# Patient Record
Sex: MALE | Race: BLACK OR AFRICAN AMERICAN | Employment: OTHER | URBAN - METROPOLITAN AREA
[De-identification: names, ages, dates, MRNs, and addresses within clinical notes are randomized per-mention and may not be internally consistent; named-entity substitution may affect disease eponyms.]

---

## 2017-01-05 ENCOUNTER — TELEPHONE (OUTPATIENT)
Dept: FAMILY MEDICINE | Facility: CLINIC | Age: 27
End: 2017-01-05

## 2017-01-05 DIAGNOSIS — E03.9 HYPOTHYROIDISM, UNSPECIFIED TYPE: Primary | ICD-10-CM

## 2017-01-11 DIAGNOSIS — E03.9 HYPOTHYROIDISM, UNSPECIFIED TYPE: ICD-10-CM

## 2017-01-11 PROCEDURE — 84443 ASSAY THYROID STIM HORMONE: CPT | Performed by: FAMILY MEDICINE

## 2017-01-11 PROCEDURE — 36415 COLL VENOUS BLD VENIPUNCTURE: CPT | Performed by: FAMILY MEDICINE

## 2017-01-12 LAB — TSH SERPL DL<=0.005 MIU/L-ACNC: 0.54 MU/L (ref 0.4–4)

## 2017-01-17 ENCOUNTER — TELEPHONE (OUTPATIENT)
Dept: FAMILY MEDICINE | Facility: CLINIC | Age: 27
End: 2017-01-17

## 2017-01-17 NOTE — TELEPHONE ENCOUNTER
Received form(s) from [employer] for [employer].  Placed form(s) in/on LS's box.  Forms need to be filled out and signed and faxed to  [phone]    Call pt to verify form was sent: No  Copy needs to be sent for scanning after completion: Yes

## 2017-04-05 DIAGNOSIS — K21.9 GASTROESOPHAGEAL REFLUX DISEASE WITHOUT ESOPHAGITIS: ICD-10-CM

## 2017-04-05 NOTE — TELEPHONE ENCOUNTER
Pending Prescriptions:                       Disp   Refills    omeprazole (PRILOSEC) 20 MG CR capsule [P*30 cap*             Sig: TAKE 1 CAPSULE BY MOUTH DAILY (30-60 MINUTE(S)           BEFORE A MEAL)          Last Written Prescription Date: 01/09/2017  Last Fill Quantity: 30,  # refills: 2   Last Office Visit with [facility], [facility] or [facility] prescribing provider: 10/19/2016

## 2017-04-06 NOTE — TELEPHONE ENCOUNTER
Prescription approved per [facility] Refill Protocol.  Christin Bradley RN

## 2017-05-04 DIAGNOSIS — E03.9 HYPOTHYROIDISM, UNSPECIFIED TYPE: ICD-10-CM

## 2017-05-04 RX ORDER — LEVOTHYROXINE SODIUM 50 UG/1
TABLET ORAL
Qty: 90 TABLET | Refills: 1 | Status: SHIPPED | OUTPATIENT
Start: 2017-05-04 | End: 2017-11-03

## 2017-05-04 NOTE — TELEPHONE ENCOUNTER
Prescription approved per [facility] Refill Protocol.     TSH   Date Value Ref Range Status   01/11/2017 0.54 0.40 - 4.00 mU/L Final

## 2017-05-14 ENCOUNTER — TRANSFERRED RECORDS (OUTPATIENT)
Dept: HEALTH INFORMATION MANAGEMENT | Facility: CLINIC | Age: 27
End: 2017-05-14

## 2017-07-07 DIAGNOSIS — K21.9 GASTROESOPHAGEAL REFLUX DISEASE WITHOUT ESOPHAGITIS: ICD-10-CM

## 2017-07-07 DIAGNOSIS — E03.9 HYPOTHYROIDISM, UNSPECIFIED TYPE: ICD-10-CM

## 2017-07-07 RX ORDER — LEVOTHYROXINE SODIUM 50 UG/1
TABLET ORAL
Start: 2017-07-07

## 2017-07-07 NOTE — TELEPHONE ENCOUNTER
Levothyroxine:  Too soon. Rx sent 5/4/17 for #90 with 1 refill    TSH   Date Value Ref Range Status   01/11/2017 0.54 0.40 - 4.00 mU/L Final     Omeprazole:  Prescription approved per [facility] Refill Protocol..  Christin Bradley RN

## 2017-09-06 DIAGNOSIS — K21.9 GASTROESOPHAGEAL REFLUX DISEASE WITHOUT ESOPHAGITIS: ICD-10-CM

## 2017-09-07 NOTE — TELEPHONE ENCOUNTER
Prescription approved per [facility] Refill Protocol.  Colleen LOPES RN

## 2017-09-07 NOTE — TELEPHONE ENCOUNTER
Pending Prescriptions:                       Disp   Refills    omeprazole (PRILOSEC) 20 MG CR capsule [P*30 cap*             Sig: TAKE 1 CAPSULE BY MOUTH DAILY (30-60 MINUTE(S)           BEFORE A MEAL)          Last Written Prescription Date: 07/07/2017  Last Fill Quantity: 30,  # refills: 1   Last Office Visit with [facility], P or [facility] prescribing provider: 10/19/2016                                         Next 5 appointments (look out 90 days)     Oct 25, 2017  3:30 PM CDT   PHYSICAL with Tatiana Juan MD   [facility] ([facility])    [address] [id number] [phone]

## 2017-09-13 ENCOUNTER — OFFICE VISIT (OUTPATIENT)
Dept: FAMILY MEDICINE | Facility: CLINIC | Age: 27
End: 2017-09-13
Payer: MEDICAID

## 2017-09-13 VITALS
HEIGHT: 69 IN | HEART RATE: 67 BPM | SYSTOLIC BLOOD PRESSURE: 122 MMHG | DIASTOLIC BLOOD PRESSURE: 84 MMHG | BODY MASS INDEX: 26.96 KG/M2 | TEMPERATURE: 97.8 F | OXYGEN SATURATION: 99 % | RESPIRATION RATE: 14 BRPM | WEIGHT: 182 LBS

## 2017-09-13 DIAGNOSIS — Z11.3 SCREEN FOR STD (SEXUALLY TRANSMITTED DISEASE): Primary | ICD-10-CM

## 2017-09-13 LAB
HCV AB SERPL QL IA: NONREACTIVE
HIV 1+2 AB+HIV1 P24 AG SERPL QL IA: NONREACTIVE
T PALLIDUM IGG+IGM SER QL: NEGATIVE

## 2017-09-13 PROCEDURE — 87491 CHLMYD TRACH DNA AMP PROBE: CPT | Performed by: FAMILY MEDICINE

## 2017-09-13 PROCEDURE — 86803 HEPATITIS C AB TEST: CPT | Performed by: FAMILY MEDICINE

## 2017-09-13 PROCEDURE — 87591 N.GONORRHOEAE DNA AMP PROB: CPT | Performed by: FAMILY MEDICINE

## 2017-09-13 PROCEDURE — 87389 HIV-1 AG W/HIV-1&-2 AB AG IA: CPT | Performed by: FAMILY MEDICINE

## 2017-09-13 PROCEDURE — 36415 COLL VENOUS BLD VENIPUNCTURE: CPT | Performed by: FAMILY MEDICINE

## 2017-09-13 PROCEDURE — 99213 OFFICE O/P EST LOW 20 MIN: CPT | Performed by: FAMILY MEDICINE

## 2017-09-13 PROCEDURE — 86780 TREPONEMA PALLIDUM: CPT | Performed by: FAMILY MEDICINE

## 2017-09-13 NOTE — LETTER
September 19, 2017    Jerrod Solis  [address]   [place] [address]      Dear Jerrod,    The results of your recent labs were normal.(Negative)  Your results are enclosed.    Thank you very much for choosing [facility]. Please call my office if you have any questions or concerns.      Sincerely,        Tatiana Juan MD

## 2017-09-13 NOTE — NURSING NOTE
"Chief Complaint   Patient presents with     STD     screening       Initial /84  Pulse 67  Temp 97.8  F (36.6  C) (Oral)  Resp 14  Ht 5' 8.5\" (1.74 m)  Wt 182 lb (82.6 kg)  SpO2 99%  BMI 27.27 kg/m2 Estimated body mass index is 27.27 kg/(m^2) as calculated from the following:    Height as of this encounter: 5' 8.5\" (1.74 m).    Weight as of this encounter: 182 lb (82.6 kg).  BP completed using cuff size: regular    Health Maintenance that is potentially due pending provider review:  Health Maintenance Due   Topic Date Due     INFLUENZA VACCINE (SYSTEM ASSIGNED)  09/01/2017         Per provider  "

## 2017-09-13 NOTE — PROGRESS NOTES
SUBJECTIVE:   Jerrod Solis is a 26 year old male who presents to clinic today for the following health issues:      Chief Complaint   Patient presents with     STD     screening     No known exposure        Problem list and histories reviewed & adjusted, as indicated.  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 No pertinent surgical history.    Social History   Substance Use Topics     Smoking status: Former Smoker     Smokeless tobacco: Never Used     Alcohol use No     Family History   Problem Relation Age of Onset     DIABETES No family hx of      Coronary Artery Disease No family hx of      Hypertension No family hx of      Hyperlipidemia No family hx of      CEREBROVASCULAR DISEASE No family hx of      Breast Cancer No family hx of      Colon Cancer No family hx of      Prostate Cancer No family hx of      Other Cancer No family hx of      Depression No family hx of      Anxiety Disorder No family hx of      MENTAL ILLNESS No family hx of      Substance Abuse No family hx of      Anesthesia Reaction No family hx of      Asthma No family hx of      OSTEOPOROSIS No family hx of      Genetic Disorder No family hx of      Thyroid Disease No family hx of      Obesity No family hx of      Unknown/Adopted No family hx of          Current Outpatient Prescriptions   Medication Sig Dispense Refill     omeprazole (PRILOSEC) 20 MG CR capsule TAKE 1 CAPSULE BY MOUTH DAILY (30-60  MINUTE(S) BEFORE A MEAL) 30 capsule 1     levothyroxine (SYNTHROID/LEVOTHROID) 50 MCG tablet TAKE 1 TABLET BY MOUTH DAILY 90 tablet 1     OLANZapine (ZYPREXA) 5 MG tablet Take 5 mg by mouth At Bedtime Pt taking 1/2 tab ( 2.5 mg) daily at bedtime       ABILIFY 10 MG tablet Take 10 mg by mouth daily        lithium (ESKALITH/LITHOBID) 300 MG CR tablet        buPROPion (WELLBUTRIN XL) 300 MG 24 hr tablet every morning       Allergies   Allergen Reactions     Depakote [Valproic Acid]      Ibuprofen      Pt on [place] and CANNOT take ibuprofen with this medication     Recent Labs   Lab Test  01/11/17   1516  12/14/16   1335  10/19/16   1619  05/25/16   1145   11/18/15   1522  10/14/15   1320   06/13/13   1843   A1C   --    --    --   5.4   --    --   5.5   --   5.8   LDL   --   131*   --   131*   --   150*   --    --    --    HDL   --   72   --   69   --   78   --    --    --    TRIG   --   113   --   112   --   157*   --    --    --    ALT   --    --   33   --    --   27  28   < >   --    CR   --    --   1.14  1.15   --   1.09  1.17   < >  1.38*   GFRESTIMATED   --    --   78  78   --   83  77   < >  65   GFRESTBLACK   --    --   >90   GFR Calc    >90   GFR Calc     --   >90   GFR Calc    >90   GFR Calc     < >  79   POTASSIUM   --    --   4.1  4.1   --   4.2  4.0   < >   --    TSH  0.54   --   0.02*  2.04   < >  0.91   --    < >  2.66    < > = values in this interval not displayed.      BP Readings from Last 3 Encounters:   09/13/17 122/84   10/19/16 118/80   09/21/16 130/88    Wt Readings from Last 3 Encounters:   09/13/17 182 lb (82.6 kg)   10/19/16 174 lb (78.9 kg)   09/21/16 176 lb 1.6 oz (79.9 kg)                  Labs reviewed in EPIC          Reviewed and updated as needed this visit by clinical staffTobacco  Allergies  Meds  Med Hx  Surg Hx  Fam Hx  Soc Hx      Reviewed and updated as needed this visit by Provider         ROS:  Constitutional,  "HEENT, cardiovascular, pulmonary, GI, , musculoskeletal, neuro, skin, endocrine and psych systems are negative, except as otherwise noted.      OBJECTIVE:   /84  Pulse 67  Temp 97.8  F (36.6  C) (Oral)  Resp 14  Ht 5' 8.5\" (1.74 m)  Wt 182 lb (82.6 kg)  SpO2 99%  BMI 27.27 kg/m2  Body mass index is 27.27 kg/(m^2).  GENERAL: healthy, alert and no distress  EYES: Eyes grossly normal to inspection, PERRL and conjunctivae and sclerae normal  NECK: no adenopathy, no asymmetry, masses, or scars and thyroid normal to palpation  RESP: lungs clear to auscultation - no rales, rhonchi or wheezes  CV: regular rate and rhythm, normal S1 S2, no S3 or S4, no murmur, click or rub, no peripheral edema and peripheral pulses strong  ABDOMEN: soft, nontender, no hepatosplenomegaly, no masses and bowel sounds normal  MS: no gross musculoskeletal defects noted, no edema    Diagnostic Test Results:  No results found for this or any previous visit (from the past 24 hour(s)).    ASSESSMENT/PLAN:         1. Screen for STD (sexually transmitted disease)  Will check , he denies symptoms but has had a new partner although they are not together anymore   - HIV Antigen Antibody Combo  - Anti Treponema  - Hepatitis C antibody  - NEISSERIA GONORRHOEA PCR  - CHLAMYDIA TRACHOMATIS PCR    Will be informed about the results , plan      Tatiana Juan MD  [facility]    "

## 2017-09-13 NOTE — MR AVS SNAPSHOT
"              After Visit Summary   9/13/2017    Jerrod Solis    MRN: [id number]           Patient Information     Date Of Birth          1990        Visit Information        Provider Department      9/13/2017 9:00 AM Tatiana Juan MD [facility]        Today's Diagnoses     Screen for STD (sexually transmitted disease)    -  1       Follow-ups after your visit        Your next 10 appointments already scheduled     Oct 25, 2017  3:30 PM CDT   PHYSICAL with Tatiana Juan MD   [facility] ([facility])    [address] [address] [phone]              Who to contact     If you have questions or need follow up information about today's clinic visit or your schedule please contact [facility] directly at [phone].  Normal or non-critical lab and imaging results will be communicated to you by MyChart, letter or phone within 4 business days after the clinic has received the results. If you do not hear from us within 7 days, please contact the clinic through MyChart or phone. If you have a critical or abnormal lab result, we will notify you by phone as soon as possible.  Submit refill requests through [employer] or call your pharmacy and they will forward the refill request to us. Please allow 3 business days for your refill to be completed.          Additional Information About Your Visit        MyChart Information     [employer] lets you send messages to your doctor, view your test results, renew your prescriptions, schedule appointments and more. To sign up, go to [email or web address] . Click on \"Log in\" on the left side of the screen, which will take you to the Welcome page. Then click on \"Sign up Now\" on the right side of the page.     You will be asked to enter the access code listed below, as well as some personal information. Please follow the directions to create your username and password.     Your access code is: " [email or web address]  Expires: 2017  9:45 AM     Your access code will  in 90 days. If you need help or a new code, please call your [facility] or [phone].        Care EveryWhere ID     This is your Care EveryWhere ID. This could be used by other organizations to access your [place] medical records  [id number]        Your Vitals Were     Pulse Temperature Respirations Height Pulse Oximetry BMI (Body Mass Index)    67 97.8  F (36.6  C) (Oral) 14 5' 8.5\" (1.74 m) 99% 27.27 kg/m2       Blood Pressure from Last 3 Encounters:   17 122/84   10/19/16 118/80   16 130/88    Weight from Last 3 Encounters:   17 182 lb (82.6 kg)   10/19/16 174 lb (78.9 kg)   16 176 lb 1.6 oz (79.9 kg)              We Performed the Following     Anti Treponema     CHLAMYDIA TRACHOMATIS PCR     Hepatitis C antibody     HIV Antigen Antibody Combo     NEISSERIA GONORRHOEA PCR        Primary Care Provider Office Phone # Fax #    Tatiana Juan [phone] [address] [address]        Equal Access to Services     JAMEL TURNER : Hadii elle ku hadasho Soomaali, waaxda luqadaha, qaybta kaalmada adeegyada, waxay idiin haysally carrero. So [facility] [phone].    ATENCIÓN: Si habla español, tiene a rock disposición servicios gratuitos de asistencia lingüística. Llame al [phone].    We comply with applicable federal civil rights laws and [place] laws. We do not discriminate on the basis of race, color, national origin, age, disability sex, sexual orientation or gender identity.            Thank you!     Thank you for choosing [facility]  for your care. Our goal is always to provide you with excellent care. Hearing back from our patients is one way we can continue to improve our services. Please take a few minutes to complete the written survey that you may receive in the mail after your visit with us. Thank you!             Your Updated " Medication List - Protect others around you: Learn how to safely use, store and throw away your medicines at www.disposemymeds.org.          This list is accurate as of: 9/13/17  9:45 AM.  Always use your most recent med list.                   Brand Name Dispense Instructions for use Diagnosis    ABILIFY 10 MG tablet   Generic drug:  ARIPiprazole      Take 10 mg by mouth daily        buPROPion 300 MG 24 hr tablet    WELLBUTRIN XL     every morning        levothyroxine 50 MCG tablet    SYNTHROID/LEVOTHROID    90 tablet    TAKE 1 TABLET BY MOUTH DAILY    Hypothyroidism, unspecified type       lithium 300 MG CR tablet    ESKALITH/LITHOBID          OLANZapine 5 MG tablet    zyPREXA     Take 5 mg by mouth At Bedtime Pt taking 1/2 tab ( 2.5 mg) daily at bedtime        omeprazole 20 MG CR capsule    priLOSEC    30 capsule    TAKE 1 CAPSULE BY MOUTH DAILY (30-60 MINUTE(S) BEFORE A MEAL)    Gastroesophageal reflux disease without esophagitis

## 2017-09-14 LAB
C TRACH DNA SPEC QL NAA+PROBE: NEGATIVE
N GONORRHOEA DNA SPEC QL NAA+PROBE: NEGATIVE
SPECIMEN SOURCE: NORMAL
SPECIMEN SOURCE: NORMAL

## 2017-11-03 DIAGNOSIS — K21.9 GASTROESOPHAGEAL REFLUX DISEASE WITHOUT ESOPHAGITIS: ICD-10-CM

## 2017-11-03 DIAGNOSIS — E03.9 HYPOTHYROIDISM, UNSPECIFIED TYPE: ICD-10-CM

## 2017-11-06 RX ORDER — LEVOTHYROXINE SODIUM 50 UG/1
TABLET ORAL
Qty: 30 TABLET | Refills: 0 | Status: SHIPPED | OUTPATIENT
Start: 2017-11-06 | End: 2017-12-06

## 2017-11-06 NOTE — TELEPHONE ENCOUNTER
Medication is being filled for 1 time refill only due to:  due for physical   Colleen LOPES RN    Requested Prescriptions   Pending Prescriptions Disp Refills     levothyroxine (SYNTHROID/LEVOTHROID) 50 MCG tablet [Pharmacy Med Name: LEVOTHYROXINE 50MCG TAB] 30 tablet      Sig: TAKE 1 TABLET BY MOUTH DAILY    Thyroid Protocol Passed    11/3/2017  1:26 PM       Passed - Patient is 12 years or older       Passed - Recent or future visit with authorizing provider's specialty    Patient had office visit in the last year or has a visit in the next 30 days with authorizing provider.  See chart review.              Passed - Normal TSH on file in past 12 months    Recent Labs   Lab Test  01/11/17   1516   TSH  0.54              omeprazole (PRILOSEC) 20 MG CR capsule [Pharmacy Med Name: OMEPRAZOLE 20MG CAP] 30 capsule      Sig: TAKE 1 CAPSULE BY MOUTH DAILY (30-60 MINUTE(S) BEFORE A MEAL)    PPI Protocol Passed    11/3/2017  1:26 PM       Passed - Not on Clopidogrel (unless Pantoprazole ordered)       Passed - No diagnosis of osteoporosis on record       Passed - Recent or future visit with authorizing provider's specialty    Patient had office visit in the last year or has a visit in the next 30 days with authorizing provider.  See chart review.              Passed - Patient is age 18 or older

## 2017-11-13 ENCOUNTER — OFFICE VISIT (OUTPATIENT)
Dept: FAMILY MEDICINE | Facility: CLINIC | Age: 27
End: 2017-11-13
Payer: MEDICAID

## 2017-11-13 ENCOUNTER — TELEPHONE (OUTPATIENT)
Dept: FAMILY MEDICINE | Facility: CLINIC | Age: 27
End: 2017-11-13

## 2017-11-13 VITALS
BODY MASS INDEX: 26.62 KG/M2 | OXYGEN SATURATION: 100 % | WEIGHT: 179.7 LBS | DIASTOLIC BLOOD PRESSURE: 86 MMHG | TEMPERATURE: 97.7 F | HEART RATE: 71 BPM | HEIGHT: 69 IN | SYSTOLIC BLOOD PRESSURE: 131 MMHG

## 2017-11-13 DIAGNOSIS — R42 DIZZINESS: Primary | ICD-10-CM

## 2017-11-13 LAB
ERYTHROCYTE [DISTWIDTH] IN BLOOD BY AUTOMATED COUNT: 17.7 % (ref 10–15)
GLUCOSE BLD-MCNC: 100 MG/DL (ref 70–99)
HCT VFR BLD AUTO: 40.5 % (ref 40–53)
HGB BLD-MCNC: 13.7 G/DL (ref 13.3–17.7)
LITHIUM SERPL-SCNC: 0.95 MMOL/L (ref 0.6–1.2)
MCH RBC QN AUTO: 27 PG (ref 26.5–33)
MCHC RBC AUTO-ENTMCNC: 33.8 G/DL (ref 31.5–36.5)
MCV RBC AUTO: 80 FL (ref 78–100)
PLATELET # BLD AUTO: 198 10E9/L (ref 150–450)
RBC # BLD AUTO: 5.07 10E12/L (ref 4.4–5.9)
WBC # BLD AUTO: 7.4 10E9/L (ref 4–11)

## 2017-11-13 PROCEDURE — 36415 COLL VENOUS BLD VENIPUNCTURE: CPT | Performed by: FAMILY MEDICINE

## 2017-11-13 PROCEDURE — 80178 ASSAY OF LITHIUM: CPT | Performed by: FAMILY MEDICINE

## 2017-11-13 PROCEDURE — 93000 ELECTROCARDIOGRAM COMPLETE: CPT | Performed by: FAMILY MEDICINE

## 2017-11-13 PROCEDURE — 99213 OFFICE O/P EST LOW 20 MIN: CPT | Performed by: FAMILY MEDICINE

## 2017-11-13 PROCEDURE — 82947 ASSAY GLUCOSE BLOOD QUANT: CPT | Mod: 59 | Performed by: FAMILY MEDICINE

## 2017-11-13 PROCEDURE — 85027 COMPLETE CBC AUTOMATED: CPT | Performed by: FAMILY MEDICINE

## 2017-11-13 PROCEDURE — 80053 COMPREHEN METABOLIC PANEL: CPT | Performed by: FAMILY MEDICINE

## 2017-11-13 NOTE — TELEPHONE ENCOUNTER
Nadine from [facility] calling  Patient has had upset stomach and dizziness  Is scheduled for today, but wants to know if can be assessed for symptoms tomorrow at physical instead.  Told Nadine depends on patient's symptoms as to when he wants to get in  Can cancel today's appt if he wants and address at physical, but if LS does not have time, may ask that pt return another date for physical  Nadine will talk to patient and call clinic back if pt not wanting appt for today  Colleen LOPES RN    Next 5 appointments (look out 90 days)     Nov 13, 2017 12:30 PM CST   Office Visit with Selene Gamez MD   [facility] ([facility]    [address] [id number] [phone]            Nov 14, 2017 11:00 AM CST   PHYSICAL with Tatiana Juan MD   [facility] ([facility])    [address]  [facility] [address] [phone]

## 2017-11-13 NOTE — PROGRESS NOTES
SUBJECTIVE:   Jerrod Solis is a 27 year old male who presents to clinic today for the following health issues:    Dizziness - happens when laying down      Duration: Started yesterday pt felt his head spinning and also woke up feeling this way today.    Description   Feeling faint:  no   Feeling like the surroundings are moving: not at the moment   Loss of consciousness or falls: no     Intensity:  mild    Accompanying signs and symptoms:   Nausea/vomitting: no   Palpitations: no   Weakness in arms or legs: no   Vision or speech changes: no   Ringing in ears (Tinnitus): no   Hearing loss related to dizziness: no   Other (fevers/chills/sweating/dyspnea): no     History (similar episodes/head trauma/previous evaluation/recent bleeding): None    Precipitating or alleviating factors (new meds/chemicals): None  Worse with activity/head movement: no     Therapies tried and outcome: None    Pt also has a rash all over body.        Problem list and histories reviewed & adjusted, as indicated.  Additional history: as documented    Patient Active Problem List   Diagnosis     Disturbance of conduct     Attention deficit hyperactivity disorder (ADHD)     Traumatic shock (H)     Alcohol affecting fetus or  via placenta or breast milk     Mood disorder in conditions classified elsewhere     Oppositional defiant disorder     CARDIOVASCULAR SCREENING; LDL GOAL LESS THAN 160     Hypothyroidism     Helicobacter positive gastritis     Hypothyroidism due to medication     Hypothyroidism, unspecified type     Gastroesophageal reflux disease without esophagitis     No past surgical history on file.    Social History   Substance Use Topics     Smoking status: Former Smoker     Smokeless tobacco: Never Used     Alcohol use No     Family History   Problem Relation Age of Onset     DIABETES No family hx of      Coronary Artery Disease No family hx of      Hypertension No family hx of      Hyperlipidemia No family hx of       "CEREBROVASCULAR DISEASE No family hx of      Breast Cancer No family hx of      Colon Cancer No family hx of      Prostate Cancer No family hx of      Other Cancer No family hx of      Depression No family hx of      Anxiety Disorder No family hx of      MENTAL ILLNESS No family hx of      Substance Abuse No family hx of      Anesthesia Reaction No family hx of      Asthma No family hx of      OSTEOPOROSIS No family hx of      Genetic Disorder No family hx of      Thyroid Disease No family hx of      Obesity No family hx of      Unknown/Adopted No family hx of          Current Outpatient Prescriptions   Medication Sig Dispense Refill     levothyroxine (SYNTHROID/LEVOTHROID) 50 MCG tablet TAKE 1 TABLET BY MOUTH DAILY 30 tablet 0     omeprazole (PRILOSEC) 20 MG CR capsule TAKE 1 CAPSULE BY MOUTH DAILY (30-60 MINUTE(S) BEFORE A MEAL) 30 capsule 0     OLANZapine (ZYPREXA) 5 MG tablet Take 5 mg by mouth At Bedtime Pt taking 1/2 tab ( 2.5 mg) daily at bedtime       ABILIFY 10 MG tablet Take 10 mg by mouth daily        lithium (ESKALITH/LITHOBID) 300 MG CR tablet        buPROPion (WELLBUTRIN XL) 300 MG 24 hr tablet every morning       triamcinolone (KENALOG) 0.1 % cream Apply sparingly to affected area two times daily for 14 days. 15 g 0         Reviewed and updated as needed this visit by clinical staff     Reviewed and updated as needed this visit by Provider         ROS:  Constitutional, HEENT, cardiovascular, pulmonary, gi and gu systems are negative, except as otherwise noted.      OBJECTIVE:                                                    /86 (BP Location: Left arm, Patient Position: Standing, Cuff Size: Adult Large)  Pulse 71  Temp 97.7  F (36.5  C) (Oral)  Ht 5' 8.5\" (1.74 m)  Wt 179 lb 11.2 oz (81.5 kg)  SpO2 100%  BMI 26.93 kg/m2  Body mass index is 26.93 kg/(m^2).  GENERAL APPEARANCE: healthy, alert and no distress  HENT: ear canals and TM's normal and nose and mouth without ulcers or " lesions  RESP: lungs clear to auscultation - no rales, rhonchi or wheezes  CV: regular rates and rhythm, normal S1 S2, no S3 or S4 and no murmur, click or rub  ABDOMEN: soft, nontender, without hepatosplenomegaly or masses and bowel sounds normal  SKIN: no suspicious lesions or rashes and he has some scattered excoriations from scratching along his arms  PSYCH: affect normal/bright, mentation appears abnormal and likely consistent with his baseline and affect flat  extended vital signs do show a rise in pulse of about 10 points with standing       ASSESSMENT/PLAN:                                                    1. Dizziness  Possibly due to dehydration.  Will Check labs as noted below as well as medication levels  Have him increase sports drinks until appetite is better.  Both staff and family report he often skips meals  - Comprehensive metabolic panel  - CBC with platelets  - Lithium level  - EKG 12-lead complete w/read - Clinics  - Glucose, whole blood  2.  The rash appears to be a dermatitis possibly due to having a new pet.  Reviewed  bathing pet to reduce and avoid fleas and avoid sleeping in bed with it  Benadryl can be used along with topical bactitracin    Follow up with Provider - if any worsening     Selene Gamez MD  [facility] [place]

## 2017-11-13 NOTE — MR AVS SNAPSHOT
"              After Visit Summary   2017    Jerrod Solis    MRN: [id number]           Patient Information     Date Of Birth          1990        Visit Information        Provider Department      2017 12:30 PM Selene Gamez MD [facility] [place] [facility]        Today's Diagnoses     Dizziness    -  1       Follow-ups after your visit        Who to contact     If you have questions or need follow up information about today's clinic visit or your schedule please contact [facility] directly at [phone].  Normal or non-critical lab and imaging results will be communicated to you by [employer], letter or phone within 4 business days after the clinic has received the results. If you do not hear from us within 7 days, please contact the clinic through [employer] or phone. If you have a critical or abnormal lab result, we will notify you by phone as soon as possible.  Submit refill requests through [employer]ix or call your pharmacy and they will forward the refill request to us. Please allow 3 business days for your refill to be completed.          Additional Information About Your Visit        MyChart Information     icix lets you send messages to your doctor, view your test results, renew your prescriptions, schedule appointments and more. To sign up, go to [email or web address] . Click on \"Log in\" on the left side of the screen, which will take you to the Welcome page. Then click on \"Sign up Now\" on the right side of the page.     You will be asked to enter the access code listed below, as well as some personal information. Please follow the directions to create your username and password.     Your access code is: [email or web address]  Expires: 2017  8:45 AM     Your access code will  in 90 days. If you need help or a new code, please call your [facility] or [phone].        Care EveryWhere ID     This is your Care EveryWhere ID. This could be used by other organizations to " "access your [facility] medical records  [id number]        Your Vitals Were     Pulse Temperature Height Pulse Oximetry BMI (Body Mass Index)       71 97.7  F (36.5  C) (Oral) 5' 8.5\" (1.74 m) 100% 26.93 kg/m2        Blood Pressure from Last 3 Encounters:   11/14/17 124/80   11/13/17 131/86   09/13/17 122/84    Weight from Last 3 Encounters:   11/14/17 180 lb (81.6 kg)   11/13/17 179 lb 11.2 oz (81.5 kg)   09/13/17 182 lb (82.6 kg)              We Performed the Following     CBC with platelets     Comprehensive metabolic panel     EKG 12-lead complete w/read - Clinics     Glucose, whole blood     Lithium level        Primary Care Provider Office Phone # Fax #    Tatiana Juan [phone] [address] [address]        Equal Access to Services     DAYNE TURNER : Hadii aad ku hadasho Soomaali, waaxda luqadaha, qaybta kaalmada adeegyada, waxay vanessain chaz ch . So [facility] [phone].    ATENCIÓN: Si habla español, tiene a rock disposición servicios gratuitos de asistencia lingüística. Llame al [phone].    We comply with applicable federal civil rights laws and [place] laws. We do not discriminate on the basis of race, color, national origin, age, disability, sex, sexual orientation, or gender identity.            Thank you!     Thank you for choosing [facility]  for your care. Our goal is always to provide you with excellent care. Hearing back from our patients is one way we can continue to improve our services. Please take a few minutes to complete the written survey that you may receive in the mail after your visit with us. Thank you!             Your Updated Medication List - Protect others around you: Learn how to safely use, store and throw away your medicines at www.disposemymeds.org.          This list is accurate as of: 11/13/17 11:59 PM.  Always use your most recent med list.                   Brand Name Dispense Instructions for " use Diagnosis    ABILIFY 10 MG tablet   Generic drug:  ARIPiprazole      Take 10 mg by mouth daily        buPROPion 300 MG 24 hr tablet    WELLBUTRIN XL     every morning        levothyroxine 50 MCG tablet    SYNTHROID/LEVOTHROID    30 tablet    TAKE 1 TABLET BY MOUTH DAILY    Hypothyroidism, unspecified type       lithium 300 MG CR tablet    ESKALITH/LITHOBID          OLANZapine 5 MG tablet    zyPREXA     Take 5 mg by mouth At Bedtime Pt taking 1/2 tab ( 2.5 mg) daily at bedtime        omeprazole 20 MG CR capsule    priLOSEC    30 capsule    TAKE 1 CAPSULE BY MOUTH DAILY (30-60 MINUTE(S) BEFORE A MEAL)    Gastroesophageal reflux disease without esophagitis

## 2017-11-14 ENCOUNTER — OFFICE VISIT (OUTPATIENT)
Dept: FAMILY MEDICINE | Facility: CLINIC | Age: 27
End: 2017-11-14
Payer: MEDICAID

## 2017-11-14 VITALS
BODY MASS INDEX: 26.66 KG/M2 | HEART RATE: 84 BPM | SYSTOLIC BLOOD PRESSURE: 124 MMHG | OXYGEN SATURATION: 100 % | WEIGHT: 180 LBS | RESPIRATION RATE: 16 BRPM | HEIGHT: 69 IN | TEMPERATURE: 97.2 F | DIASTOLIC BLOOD PRESSURE: 80 MMHG

## 2017-11-14 DIAGNOSIS — A63.0 GENITAL WARTS: ICD-10-CM

## 2017-11-14 DIAGNOSIS — R21 RASH: ICD-10-CM

## 2017-11-14 DIAGNOSIS — R42 DIZZINESS: ICD-10-CM

## 2017-11-14 DIAGNOSIS — Z00.00 ENCOUNTER FOR ROUTINE ADULT HEALTH EXAMINATION WITHOUT ABNORMAL FINDINGS: Primary | ICD-10-CM

## 2017-11-14 LAB
ALBUMIN SERPL-MCNC: 4 G/DL (ref 3.4–5)
ALP SERPL-CCNC: 88 U/L (ref 40–150)
ALT SERPL W P-5'-P-CCNC: 35 U/L (ref 0–70)
ANION GAP SERPL CALCULATED.3IONS-SCNC: 8 MMOL/L (ref 3–14)
AST SERPL W P-5'-P-CCNC: 28 U/L (ref 0–45)
BILIRUB SERPL-MCNC: 1.1 MG/DL (ref 0.2–1.3)
BUN SERPL-MCNC: 9 MG/DL (ref 7–30)
CALCIUM SERPL-MCNC: 9.8 MG/DL (ref 8.5–10.1)
CHLORIDE SERPL-SCNC: 105 MMOL/L (ref 94–109)
CO2 SERPL-SCNC: 26 MMOL/L (ref 20–32)
CREAT SERPL-MCNC: 1.27 MG/DL (ref 0.66–1.25)
GFR SERPL CREATININE-BSD FRML MDRD: 69 ML/MIN/1.7M2
GLUCOSE SERPL-MCNC: 84 MG/DL (ref 70–99)
POTASSIUM SERPL-SCNC: 3.8 MMOL/L (ref 3.4–5.3)
PROT SERPL-MCNC: 7.6 G/DL (ref 6.8–8.8)
SODIUM SERPL-SCNC: 139 MMOL/L (ref 133–144)

## 2017-11-14 PROCEDURE — 84443 ASSAY THYROID STIM HORMONE: CPT | Performed by: FAMILY MEDICINE

## 2017-11-14 PROCEDURE — 83735 ASSAY OF MAGNESIUM: CPT | Performed by: FAMILY MEDICINE

## 2017-11-14 PROCEDURE — 99395 PREV VISIT EST AGE 18-39: CPT | Performed by: FAMILY MEDICINE

## 2017-11-14 PROCEDURE — 17110 DESTRUCTION B9 LES UP TO 14: CPT | Performed by: FAMILY MEDICINE

## 2017-11-14 PROCEDURE — 36415 COLL VENOUS BLD VENIPUNCTURE: CPT | Performed by: FAMILY MEDICINE

## 2017-11-14 PROCEDURE — 99213 OFFICE O/P EST LOW 20 MIN: CPT | Mod: 25 | Performed by: FAMILY MEDICINE

## 2017-11-14 RX ORDER — TRIAMCINOLONE ACETONIDE 1 MG/G
CREAM TOPICAL
Qty: 15 G | Refills: 0 | Status: SHIPPED | OUTPATIENT
Start: 2017-11-14 | End: 2021-01-12

## 2017-11-14 NOTE — NURSING NOTE
"Chief Complaint   Patient presents with     Physical     pt is fasting       Initial /80  Pulse 84  Temp 97.2  F (36.2  C) (Oral)  Resp 16  Ht 5' 8.5\" (1.74 m)  Wt 180 lb (81.6 kg)  SpO2 100%  BMI 26.97 kg/m2 Estimated body mass index is 26.97 kg/(m^2) as calculated from the following:    Height as of this encounter: 5' 8.5\" (1.74 m).    Weight as of this encounter: 180 lb (81.6 kg).  Medication Reconciliation: complete  "

## 2017-11-14 NOTE — MR AVS SNAPSHOT
After Visit Summary   11/14/2017    Jerrod Solis    MRN: [id number]           Patient Information     Date Of Birth          1990        Visit Information        Provider Department      11/14/2017 11:00 AM Tatiana Juan MD [facility]        Today's Diagnoses     Encounter for routine adult health examination without abnormal findings    -  1    Dizziness        Rash        Genital warts          Care Instructions      Preventive Health Recommendations  Male Ages 26 - 39    Yearly exam:             See your health care provider every year in order to  o   Review health changes.   o   Discuss preventive care.    o   Review your medicines if your doctor has prescribed any.    You should be tested each year for STDs (sexually transmitted diseases), if you re at risk.     After age 35, talk to your provider about cholesterol testing. If you are at risk for heart disease, have your cholesterol tested at least every 5 years.     If you are at risk for diabetes, you should have a diabetes test (fasting glucose).  Shots: Get a flu shot each year. Get a tetanus shot every 10 years.     Nutrition:    Eat at least 5 servings of fruits and vegetables daily.     Eat whole-grain bread, whole-wheat pasta and brown rice instead of white grains and rice.     Talk to your provider about Calcium and Vitamin D.     Lifestyle    Exercise for at least 150 minutes a week (30 minutes a day, 5 days a week). This will help you control your weight and prevent disease.     Limit alcohol to one drink per day.     No smoking.     Wear sunscreen to prevent skin cancer.     See your dentist every six months for an exam and cleaning.             Follow-ups after your visit        Who to contact     If you have questions or need follow up information about today's clinic visit or your schedule please contact [facility] directly at [phone].  Normal or non-critical lab and imaging results will be  "communicated to you by [employer], letter or phone within 4 business days after the clinic has received the results. If you do not hear from us within 7 days, please contact the clinic through [employer] or phone. If you have a critical or abnormal lab result, we will notify you by phone as soon as possible.  Submit refill requests through [employer] or call your pharmacy and they will forward the refill request to us. Please allow 3 business days for your refill to be completed.          Additional Information About Your Visit        [employer] Information     [employer] lets you send messages to your doctor, view your test results, renew your prescriptions, schedule appointments and more. To sign up, go to [email or web address] . Click on \"Log in\" on the left side of the screen, which will take you to the Welcome page. Then click on \"Sign up Now\" on the right side of the page.     You will be asked to enter the access code listed below, as well as some personal information. Please follow the directions to create your username and password.     Your access code is: [email or web address]  Expires: 2017  8:45 AM     Your access code will  in 90 days. If you need help or a new code, please call your [place] clinic or [phone].        Care EveryWhere ID     This is your Care EveryWhere ID. This could be used by other organizations to access your [place] medical records  [id number]        Your Vitals Were     Pulse Temperature Respirations Height Pulse Oximetry BMI (Body Mass Index)    84 97.2  F (36.2  C) (Oral) 16 5' 8.5\" (1.74 m) 100% 26.97 kg/m2       Blood Pressure from Last 3 Encounters:   17 124/80   17 131/86   17 122/84    Weight from Last 3 Encounters:   17 180 lb (81.6 kg)   17 179 lb 11.2 oz (81.5 kg)   17 182 lb (82.6 kg)              We Performed the Following     DESTRUCT BENIGN LESION, UP TO 14     Magnesium     OFFICE/OUTPT VISIT,EST,LEVL III     TSH with free T4 reflex "          Today's Medication Changes          These changes are accurate as of: 11/14/17 11:46 AM.  If you have any questions, ask your nurse or doctor.               Start taking these medicines.        Dose/Directions    triamcinolone 0.1 % cream   Commonly known as:  KENALOG   Used for:  Rash   Started by:  Tatiana Juan MD        Apply sparingly to affected area two times daily for 14 days.   Quantity:  15 g   Refills:  0            Where to get your medicines      These medications were sent to [facility] [address]  [address], [facility] [address]     Phone:  [phone]     triamcinolone 0.1 % cream                Primary Care Provider Office Phone # Fax #    Tatiana Juan [phone] [address] [address]        Equal Access to Services     [facility]: Hadii elle ku hadasho Soomaali, waaxda luqadaha, qaybta kaalmada adeegyada, araseli cagle haysally ch . So [facility] [phone].    ATENCIÓN: Si habla español, tiene a rock disposición servicios gratuitos de asistencia lingüística. Llame al [phone].    We comply with applicable federal civil rights laws and [place] laws. We do not discriminate on the basis of race, color, national origin, age, disability, sex, sexual orientation, or gender identity.            Thank you!     Thank you for choosing [facility]  for your care. Our goal is always to provide you with excellent care. Hearing back from our patients is one way we can continue to improve our services. Please take a few minutes to complete the written survey that you may receive in the mail after your visit with us. Thank you!             Your Updated Medication List - Protect others around you: Learn how to safely use, store and throw away your medicines at www.disposemymeds.org.          This list is accurate as of: 11/14/17 11:46 AM.  Always use your most recent  med list.                   Brand Name Dispense Instructions for use Diagnosis    ABILIFY 10 MG tablet   Generic drug:  ARIPiprazole      Take 10 mg by mouth daily        buPROPion 300 MG 24 hr tablet    WELLBUTRIN XL     every morning        levothyroxine 50 MCG tablet    SYNTHROID/LEVOTHROID    30 tablet    TAKE 1 TABLET BY MOUTH DAILY    Hypothyroidism, unspecified type       lithium 300 MG CR tablet    ESKALITH/LITHOBID          OLANZapine 5 MG tablet    zyPREXA     Take 5 mg by mouth At Bedtime Pt taking 1/2 tab ( 2.5 mg) daily at bedtime        omeprazole 20 MG CR capsule    priLOSEC    30 capsule    TAKE 1 CAPSULE BY MOUTH DAILY (30-60 MINUTE(S) BEFORE A MEAL)    Gastroesophageal reflux disease without esophagitis       triamcinolone 0.1 % cream    KENALOG    15 g    Apply sparingly to affected area two times daily for 14 days.    Rash

## 2017-11-14 NOTE — PROGRESS NOTES
SUBJECTIVE:   CC: Jerrod Solis is an 27 year old male who presents for preventative health visit.   1) dizziness, was seen yesterday when the dizzines was really bad but not so bad today , although he has skipped breakfast today , has tried to drink more water   2) hypothyroidism , will check thyroid today , went over labs from yesterday   3) rash, small bumps on arms and lags for a while and he is scratching them and then they get worse, scabs form and then he picks at the scabs and starts all over again   4) genital warts, was treated a couple of yrs ago for that and thinks a fe wof them a re back , on his penile shaft   Healthy Habits:    Do you get at least three servings of calcium containing foods daily (dairy, green leafy vegetables, etc.)? yes    Amount of exercise or daily activities, outside of work: 6 day(s) per week    Problems taking medications regularly No    Medication side effects: No    Have you had an eye exam in the past two years? no    Do you see a dentist twice per year? yes    Do you have sleep apnea, excessive snoring or daytime drowsiness?no              Today's PHQ-2 Score: PHQ-2 ( 1999 Pfizer) 10/19/2016 6/19/2015   Q1: Little interest or pleasure in doing things 0 0   Q2: Feeling down, depressed or hopeless 0 0   PHQ-2 Score 0 0       Abuse: Current or Past(Physical, Sexual or Emotional)- No  Do you feel safe in your environment - Yes    Social History   Substance Use Topics     Smoking status: Former Smoker     Smokeless tobacco: Never Used     Alcohol use No     The patient does not drink >3 drinks per day nor >7 drinks per week.    Last PSA: No results found for: PSA    Reviewed orders with patient. 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    Social History   Substance Use Topics     Smoking status: Former Smoker     Smokeless tobacco: Never Used     Alcohol use No     Family History   Problem Relation Age of Onset     DIABETES No family hx of      Coronary Artery Disease No family hx of      Hypertension No family hx of      Hyperlipidemia No family hx of      CEREBROVASCULAR DISEASE No family hx of      Breast Cancer No family hx of      Colon Cancer No family hx of      Prostate Cancer No family hx of      Other Cancer No family hx of      Depression No family hx of      Anxiety Disorder No family hx of      MENTAL ILLNESS No family hx of      Substance Abuse No family hx of      Anesthesia Reaction No family hx of      Asthma No family hx of      OSTEOPOROSIS No family hx of      Genetic Disorder No family hx of      Thyroid Disease No family hx of      Obesity No family hx of      Unknown/Adopted No family hx of          Current Outpatient Prescriptions   Medication Sig Dispense Refill     triamcinolone (KENALOG) 0.1 % cream Apply sparingly to affected area two times daily for 14 days. 15 g 0     levothyroxine (SYNTHROID/LEVOTHROID) 50 MCG tablet TAKE 1 TABLET BY MOUTH DAILY 30 tablet 0     omeprazole (PRILOSEC) 20 MG CR capsule TAKE 1 CAPSULE BY MOUTH DAILY (30-60 MINUTE(S) BEFORE A MEAL) 30 capsule 0     OLANZapine (ZYPREXA) 5 MG tablet  Take 5 mg by mouth At Bedtime Pt taking 1/2 tab ( 2.5 mg) daily at bedtime       ABILIFY 10 MG tablet Take 10 mg by mouth daily        lithium (ESKALITH/LITHOBID) 300 MG CR tablet        buPROPion (WELLBUTRIN XL) 300 MG 24 hr tablet every morning       Allergies   Allergen Reactions     Depakote [Valproic Acid]      Ibuprofen      Pt on [place] and CANNOT take ibuprofen with this medication     Recent Labs   Lab Test  17   1516  16   1335  10/19/16   1619  16   1145   11/18/15   1522  10/14/15   1320   13   1843   A1C   --    --    --   5.4   --    --   5.5   --   5.8   LDL   --   131*   --   131*   --   150*   --    --    --    HDL   --   72   --   69   --   78   --    --    --    TRIG   --   113   --   112   --   157*   --    --    --    ALT   --    --   33   --    --   27  28   < >   --    CR   --    --   1.14  1.15   --   1.09  1.17   < >  1.38*   GFRESTIMATED   --    --   78  78   --   83  77   < >  65   GFRESTBLACK   --    --   >90   GFR Calc    >90   GFR Calc     --   >90   GFR Calc    >90   GFR Calc     < >  79   POTASSIUM   --    --   4.1  4.1   --   4.2  4.0   < >   --    TSH  0.54   --   0.02*  2.04   < >  0.91   --    < >  2.66    < > = values in this interval not displayed.              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    ROS:  C: NEGATIVE for fever, chills, change in weight  I: NEGATIVE for worrisome rashes, moles or lesions  E: NEGATIVE for vision changes or irritation  ENT: NEGATIVE for ear, mouth and throat problems  R: NEGATIVE for significant cough or SOB  CV: NEGATIVE for chest pain, palpitations or peripheral edema  GI: NEGATIVE for nausea, abdominal pain, heartburn, or change in bowel habits   male: negative for dysuria, hematuria, decreased urinary stream, erectile dysfunction, urethral discharge  M: NEGATIVE for significant arthralgias or myalgia  N: NEGATIVE for weakness, dizziness or paresthesias  P: NEGATIVE for changes in mood or affect    OBJECTIVE:   /80  Pulse 84  Temp 97.2  F (36.2  C) (Oral)  Resp 16  Ht 5' 8.5\" (1.74 m)  Wt 180 lb (81.6 kg)  SpO2 100%  BMI 26.97 kg/m2  EXAM:  GENERAL: healthy, alert and no distress  EYES: Eyes grossly normal to inspection, PERRL and conjunctivae and sclerae normal  HENT: ear canals and TM's normal, nose and mouth without ulcers or lesions  NECK: no adenopathy, no asymmetry, masses, or scars and thyroid normal to palpation  RESP: lungs clear to auscultation - no rales, rhonchi or wheezes  CV: regular rate and rhythm, normal S1 S2, no S3 or S4, no murmur, click or rub, no peripheral edema and peripheral pulses strong  ABDOMEN: soft, nontender, no hepatosplenomegaly, no masses and bowel sounds normal  MS: no gross musculoskeletal defects noted, no edema  SKIN: no suspicious lesions EXCEPT there are multiple scabs and also papules on his arms lower part and legs , no erythema , no drainage   NEURO: Normal strength and tone, mentation intact and speech normal  PSYCH: mentation appears normal, affect normal/bright    ASSESSMENT/PLAN:   1. Encounter for routine adult health examination without abnormal findings  He has had labs done yesterday , will add the thyroid today   - TSH with free T4 reflex    2. Dizziness  Seems " "better , will check TSH and also magnesium, but discussed with Jerrod to eat three meals a day and also make sure he stays hydrated . RTC if no improving or worsening.    - TSH with free T4 reflex  - Magnesium    3. Rash  We discussed using the TMC cream topically on the lesions , also try not to scratch them , RTC if no improving or worsening.    - triamcinolone (KENALOG) 0.1 % cream; Apply sparingly to affected area two times daily for 14 days.  Dispense: 15 g; Refill: 0      5. Genital warts  I did treat a couple of small warts on the penile shaft both of them are less than 0.6 cm in size , did liquid nitrogen treatment , RTC if no improving or worsening.    - DESTRUCT BENIGN LESION, UP TO 14    COUNSELING:  Reviewed preventive health counseling, as reflected in patient instructions       Regular exercise       Healthy diet/nutrition       Vision screening       Safe sex practices/STD prevention           reports that he has quit smoking. He has never used smokeless tobacco.      Estimated body mass index is 26.97 kg/(m^2) as calculated from the following:    Height as of this encounter: 5' 8.5\" (1.74 m).    Weight as of this encounter: 180 lb (81.6 kg).         Counseling Resources:  ATP IV Guidelines  Pooled Cohorts Equation Calculator  FRAX Risk Assessment  ICSI Preventive Guidelines  Dietary Guidelines for Americans, 2010  USDA's MyPlate  ASA Prophylaxis  Lung CA Screening    Tatiana Juan MD  [facility]  "

## 2017-11-14 NOTE — LETTER
"December 4, 2017      Jerrod Solis  [address]  [place] [address]        Dear ,    We are writing to inform you of your test results.    Please make an appointment for a \"Lab Only\" visit so that we can recheck and follow up on your Magnesium levels.  Your recent Magnesium level was low.  Appointments can be made by calling [phone].    Resulted Orders   TSH with free T4 reflex   Result Value Ref Range    TSH 0.57 0.40 - 4.00 mU/L   Magnesium   Result Value Ref Range    Magnesium 2.5 (H) 1.6 - 2.3 mg/dL       If you have any questions or concerns, please call the clinic at the number listed above.       Sincerely,        Tatiana Juan MD                "

## 2017-11-15 LAB
MAGNESIUM SERPL-MCNC: 2.5 MG/DL (ref 1.6–2.3)
TSH SERPL DL<=0.005 MIU/L-ACNC: 0.57 MU/L (ref 0.4–4)

## 2017-11-20 ENCOUNTER — TELEPHONE (OUTPATIENT)
Dept: FAMILY MEDICINE | Facility: CLINIC | Age: 27
End: 2017-11-20

## 2017-11-20 NOTE — PROGRESS NOTES
Please call patient with results - it looks like he has a slight anemia.  I would recommend that he see his PCP to review this further.  In addition his kidney function was a little impaired.  It would be good to folllow this and recheck this.  Thank you!    Selene

## 2017-11-20 NOTE — TELEPHONE ENCOUNTER
Notes Recorded by Selene Gamez MD on 11/19/2017 at 10:15 PM  Please call patient with results - it looks like he has a slight anemia.  I would recommend that he see his PCP to review this further.  In addition his kidney function was a little impaired.  It would be good to folllow this and recheck this.  Thank you!    Selene

## 2017-12-06 DIAGNOSIS — E03.9 HYPOTHYROIDISM, UNSPECIFIED TYPE: ICD-10-CM

## 2017-12-06 DIAGNOSIS — K21.9 GASTROESOPHAGEAL REFLUX DISEASE WITHOUT ESOPHAGITIS: ICD-10-CM

## 2017-12-06 RX ORDER — LEVOTHYROXINE SODIUM 50 UG/1
TABLET ORAL
Qty: 90 TABLET | Refills: 3 | Status: SHIPPED | OUTPATIENT
Start: 2017-12-06 | End: 2019-01-03

## 2017-12-06 NOTE — TELEPHONE ENCOUNTER
Requested Prescriptions   Pending Prescriptions Disp Refills     omeprazole (PRILOSEC) 20 MG CR capsule [Pharmacy Med Name: OMEPRAZOLE 20MG CAP] 30 capsule      Sig: TAKE 1 CAPSULE BY MOUTH DAILY (30-60 MINUTE(S) BEFORE A MEAL)    PPI Protocol Passed    12/6/2017  1:35 PM       Passed - Not on Clopidogrel (unless Pantoprazole ordered)       Passed - No diagnosis of osteoporosis on record       Passed - Recent or future visit with authorizing provider's specialty    Patient had office visit in the last year or has a visit in the next 30 days with authorizing provider.  See chart review.              Passed - Patient is age 18 or older        levothyroxine (SYNTHROID/LEVOTHROID) 50 MCG tablet [Pharmacy Med Name: LEVOTHYROXINE 50MCG TAB] 30 tablet      Sig: TAKE 1 TABLET BY MOUTH DAILY    Thyroid Protocol Passed    12/6/2017  1:35 PM       Passed - Patient is 12 years or older       Passed - Recent or future visit with authorizing provider's specialty    Patient had office visit in the last year or has a visit in the next 30 days with authorizing provider.  See chart review.              Passed - Normal TSH on file in past 12 months    Recent Labs   Lab Test  11/14/17   1139   TSH  0.57

## 2017-12-06 NOTE — TELEPHONE ENCOUNTER
Prescription approved per [facility].  Colleen LOPES RN    Requested Prescriptions   Pending Prescriptions Disp Refills     omeprazole (PRILOSEC) 20 MG CR capsule [Pharmacy Med Name: OMEPRAZOLE 20MG CAP] 30 capsule      Sig: TAKE 1 CAPSULE BY MOUTH DAILY (30-60 MINUTE(S) BEFORE A MEAL)    PPI Protocol Passed    12/6/2017  4:07 PM       Passed - Not on Clopidogrel (unless Pantoprazole ordered)       Passed - No diagnosis of osteoporosis on record       Passed - Recent or future visit with authorizing provider's specialty    Patient had office visit in the last year or has a visit in the next 30 days with authorizing provider.  See chart review.              Passed - Patient is age 18 or older        levothyroxine (SYNTHROID/LEVOTHROID) 50 MCG tablet [Pharmacy Med Name: LEVOTHYROXINE 50MCG TAB] 30 tablet      Sig: TAKE 1 TABLET BY MOUTH DAILY    Thyroid Protocol Passed    12/6/2017  4:07 PM       Passed - Patient is 12 years or older       Passed - Recent or future visit with authorizing provider's specialty    Patient had office visit in the last year or has a visit in the next 30 days with authorizing provider.  See chart review.              Passed - Normal TSH on file in past 12 months    Recent Labs   Lab Test  11/14/17   1139   TSH  0.57

## 2018-01-09 ENCOUNTER — OFFICE VISIT (OUTPATIENT)
Dept: FAMILY MEDICINE | Facility: CLINIC | Age: 28
End: 2018-01-09
Payer: MEDICAID

## 2018-01-09 VITALS
HEART RATE: 72 BPM | BODY MASS INDEX: 27.25 KG/M2 | TEMPERATURE: 96.6 F | DIASTOLIC BLOOD PRESSURE: 73 MMHG | SYSTOLIC BLOOD PRESSURE: 131 MMHG | RESPIRATION RATE: 16 BRPM | WEIGHT: 184 LBS | HEIGHT: 69 IN | OXYGEN SATURATION: 97 %

## 2018-01-09 DIAGNOSIS — B35.3 TINEA PEDIS OF LEFT FOOT: Primary | ICD-10-CM

## 2018-01-09 DIAGNOSIS — R73.01 ELEVATED FASTING GLUCOSE: ICD-10-CM

## 2018-01-09 LAB — HBA1C MFR BLD: 5 % (ref 4.3–6)

## 2018-01-09 PROCEDURE — 82947 ASSAY GLUCOSE BLOOD QUANT: CPT | Performed by: FAMILY MEDICINE

## 2018-01-09 PROCEDURE — 83036 HEMOGLOBIN GLYCOSYLATED A1C: CPT | Performed by: FAMILY MEDICINE

## 2018-01-09 PROCEDURE — 36415 COLL VENOUS BLD VENIPUNCTURE: CPT | Performed by: FAMILY MEDICINE

## 2018-01-09 PROCEDURE — 99214 OFFICE O/P EST MOD 30 MIN: CPT | Performed by: FAMILY MEDICINE

## 2018-01-09 RX ORDER — PRENATAL VIT 91/IRON/FOLIC/DHA 28-975-200
COMBINATION PACKAGE (EA) ORAL 2 TIMES DAILY
Qty: 80 G | Refills: 1 | Status: SHIPPED | OUTPATIENT
Start: 2018-01-09 | End: 2021-01-20

## 2018-01-09 NOTE — NURSING NOTE
"Chief Complaint   Patient presents with     Foot Problems     /73  Pulse 72  Temp 96.6  F (35.9  C) (Oral)  Resp 16  Ht 5' 8.5\" (1.74 m)  Wt 184 lb (83.5 kg)  SpO2 97%  BMI 27.57 kg/m2 Estimated body mass index is 27.57 kg/(m^2) as calculated from the following:    Height as of this encounter: 5' 8.5\" (1.74 m).    Weight as of this encounter: 184 lb (83.5 kg).  bp completed using cuff size: regular       Health Maintenance addressed:  NONE    n/a    Zahida Maradiaga MA     "

## 2018-01-09 NOTE — MR AVS SNAPSHOT
After Visit Summary   1/9/2018    Jerrod Solis    MRN: [id number]           Patient Information     Date Of Birth          1990        Visit Information        Provider Department      1/9/2018 10:45 AM Tatiana Juan MD [facility]        Today's Diagnoses     Tinea pedis of left foot    -  1       Follow-ups after your visit        Additional Services     PODIATRY/FOOT & ANKLE SURGERY REFERRAL       Your provider has referred you to: [facility]: [facility] [phone]   [email or web address]    Please be aware that coverage of these services is subject to the terms and limitations of your health insurance plan.  Call member services at your health plan with any benefit or coverage questions.      Please bring the following to your appointment:  >>   Any x-rays, CTs or MRIs which have been performed.  Contact the facility where they were done to arrange for  prior to your scheduled appointment.    >>   List of current medications   >>   This referral request   >>   Any documents/labs given to you for this referral                  Who to contact     If you have questions or need follow up information about today's clinic visit or your schedule please contact [facility] directly at [phone].  Normal or non-critical lab and imaging results will be communicated to you by globa.lyhart, letter or phone within 4 business days after the clinic has received the results. If you do not hear from us within 7 days, please contact the clinic through globa.lyhart or phone. If you have a critical or abnormal lab result, we will notify you by phone as soon as possible.  Submit refill requests through [employer] or call your pharmacy and they will forward the refill request to us. Please allow 3 business days for your refill to be completed.          Additional Information About Your Visit        [email or web address] Information     [employer] lets you send messages  "to your doctor, view your test results, renew your prescriptions, schedule appointments and more. To sign up, go to [email or web address] . Click on \"Log in\" on the left side of the screen, which will take you to the Welcome page. Then click on \"Sign up Now\" on the right side of the page.     You will be asked to enter the access code listed below, as well as some personal information. Please follow the directions to create your username and password.     Your access code is: [email or web address]  Expires: 2018 11:09 AM     Your access code will  in 90 days. If you need help or a new code, please call your [place] clinic or [phone].        Care EveryWhere ID     This is your Care EveryWhere ID. This could be used by other organizations to access your [facility] medical records  [id number]        Your Vitals Were     Pulse Temperature Respirations Height Pulse Oximetry BMI (Body Mass Index)    72 96.6  F (35.9  C) (Oral) 16 5' 8.5\" (1.74 m) 97% 27.57 kg/m2       Blood Pressure from Last 3 Encounters:   18 131/73   17 124/80   17 131/86    Weight from Last 3 Encounters:   18 184 lb (83.5 kg)   17 180 lb (81.6 kg)   17 179 lb 11.2 oz (81.5 kg)              We Performed the Following     PODIATRY/FOOT & ANKLE SURGERY REFERRAL          Today's Medication Changes          These changes are accurate as of: 18 11:09 AM.  If you have any questions, ask your nurse or doctor.               Start taking these medicines.        Dose/Directions    terbinafine 1 % cream   Commonly known as:  lamISIL AT   Used for:  Tinea pedis of left foot   Started by:  Tatiana Juan MD        Apply topically 2 times daily For fungal infection not resolved with other antifungals (e.g. Clotrimazole)   Quantity:  80 g   Refills:  1            Where to get your medicines      These medications were sent to [place], [place] - [address]  [address]  " [place] [facility] [address]     Phone:  [phone]     terbinafine 1 % cream                Primary Care Provider Office Phone # Fax #    Tatiana Juan [phone] [address] [facility]   [facility] [address]        Equal Access to Services     DAYNE TURNER : Hadii aad ku hadasho Soomaali, waaxda luqadaha, qaybta kaalmada adeegyada, waxay vanessain haybearn adeaundrea nicole dima carrero. So [facility] [phone].    ATENCIÓN: Si habla español, tiene a rock disposición servicios gratuitos de asistencia lingüística. Aislinn al [phone].    We comply with applicable federal civil rights laws and [place] laws. We do not discriminate on the basis of race, color, national origin, age, disability, sex, sexual orientation, or gender identity.            Thank you!     Thank you for choosing [facility]  for your care. Our goal is always to provide you with excellent care. Hearing back from our patients is one way we can continue to improve our services. Please take a few minutes to complete the written survey that you may receive in the mail after your visit with us. Thank you!             Your Updated Medication List - Protect others around you: Learn how to safely use, store and throw away your medicines at www.disposemymeds.org.          This list is accurate as of: 1/9/18 11:09 AM.  Always use your most recent med list.                   Brand Name Dispense Instructions for use Diagnosis    ABILIFY 10 MG tablet   Generic drug:  ARIPiprazole      Take 10 mg by mouth daily        buPROPion 300 MG 24 hr tablet    WELLBUTRIN XL     every morning        levothyroxine 50 MCG tablet    SYNTHROID/LEVOTHROID    90 tablet    TAKE 1 TABLET BY MOUTH DAILY    Hypothyroidism, unspecified type       lithium 300 MG CR tablet    ESKALITH/LITHOBID          * OLANZapine 5 MG tablet    zyPREXA     Take 5 mg by mouth At Bedtime Pt taking 1/2 tab ( 2.5 mg) daily at bedtime        * OLANZapine 2.5 MG tablet    zyPREXA           omeprazole 20 MG CR capsule    priLOSEC    90 capsule    TAKE 1 CAPSULE BY MOUTH DAILY (30-60 MINUTE(S) BEFORE A MEAL)    Gastroesophageal reflux disease without esophagitis       terbinafine 1 % cream    lamISIL AT    80 g    Apply topically 2 times daily For fungal infection not resolved with other antifungals (e.g. Clotrimazole)    Tinea pedis of left foot       triamcinolone 0.1 % cream    KENALOG    15 g    Apply sparingly to affected area two times daily for 14 days.    Rash       * Notice:  This list has 2 medication(s) that are the same as other medications prescribed for you. Read the directions carefully, and ask your doctor or other care provider to review them with you.

## 2018-01-09 NOTE — PROGRESS NOTES
SUBJECTIVE:   Jerrod Solis is a 27 year old male who presents to clinic today for the following health issues:      Concern - Athletes foot   Onset: for months     Description:   Left foot     Intensity: moderate    Progression of Symptoms:  intermittent    Accompanying Signs & Symptoms:  Burning and dry     Previous history of similar problem:   None     Precipitating factors:   Worsened by: nothing     Alleviating factors:  Improved by: nothing     Therapies Tried and outcome: patient purchase a lotion at [employer] did not help           Problem list and histories reviewed & adjusted, as indicated.  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 No pertinent surgical history.    Social History   Substance Use Topics     Smoking status: Former Smoker     Smokeless tobacco: Never Used     Alcohol use No     Family History   Problem Relation Age of Onset     DIABETES No family hx of      Coronary Artery Disease No family hx of      Hypertension No family hx of      Hyperlipidemia No family hx of      CEREBROVASCULAR DISEASE No family hx of      Breast Cancer No family hx of      Colon Cancer No family hx of      Prostate Cancer No family hx of      Other Cancer No family hx of      Depression No family hx of      Anxiety Disorder No family hx of      MENTAL ILLNESS No family hx of      Substance Abuse No family hx of      Anesthesia Reaction No family hx of      Asthma No  family hx of      OSTEOPOROSIS No family hx of      Genetic Disorder No family hx of      Thyroid Disease No family hx of      Obesity No family hx of      Unknown/Adopted No family hx of          Current Outpatient Prescriptions   Medication Sig Dispense Refill     terbinafine (LAMISIL AT) 1 % cream Apply topically 2 times daily For fungal infection not resolved with other antifungals (e.g. Clotrimazole) 80 g 1     OLANZapine (ZYPREXA) 2.5 MG tablet        omeprazole (PRILOSEC) 20 MG CR capsule TAKE 1 CAPSULE BY MOUTH DAILY (30-60 MINUTE(S) BEFORE A MEAL) 90 capsule 3     levothyroxine (SYNTHROID/LEVOTHROID) 50 MCG tablet TAKE 1 TABLET BY MOUTH DAILY 90 tablet 3     triamcinolone (KENALOG) 0.1 % cream Apply sparingly to affected area two times daily for 14 days. 15 g 0     OLANZapine (ZYPREXA) 5 MG tablet Take 5 mg by mouth At Bedtime Pt taking 1/2 tab ( 2.5 mg) daily at bedtime       ABILIFY 10 MG tablet Take 10 mg by mouth daily        lithium (ESKALITH/LITHOBID) 300 MG CR tablet        buPROPion (WELLBUTRIN XL) 300 MG 24 hr tablet every morning       Allergies   Allergen Reactions     Depakote [Valproic Acid]      Ibuprofen      Pt on [place] and CANNOT take ibuprofen with this medication     Recent Labs   Lab Test  01/09/18   1116  11/14/17   1139  11/13/17   1257  01/11/17   1516  12/14/16   1335  10/19/16   1619  05/25/16   1145   11/18/15   1522  10/14/15   1320   A1C  5.0   --    --    --    --    --   5.4   --    --   5.5   LDL   --    --    --    --   131*   --   131*   --   150*   --    HDL   --    --    --    --   72   --   69   --   78   --    TRIG   --    --    --    --   113   --   112   --   157*   --    ALT   --    --   35   --    --   33   --    --   27  28   CR   --    --   1.27*   --    --   1.14  1.15   --   1.09  1.17   GFRESTIMATED   --    --   69   --    --   78  78   --   83  77   GFRESTBLACK   --    --   83   --    --   >90   GFR Calc    >90   GFR Calc      "--   >90   GFR Calc    >90   GFR Calc     POTASSIUM   --    --   3.8   --    --   4.1  4.1   --   4.2  4.0   TSH   --   0.57   --   0.54   --   0.02*  2.04   < >  0.91   --     < > = values in this interval not displayed.      BP Readings from Last 3 Encounters:   01/09/18 131/73   11/14/17 124/80   11/13/17 131/86    Wt Readings from Last 3 Encounters:   01/09/18 184 lb (83.5 kg)   11/14/17 180 lb (81.6 kg)   11/13/17 179 lb 11.2 oz (81.5 kg)                  Labs reviewed in EPIC          Reviewed and updated as needed this visit by clinical staffTobacco  Allergies  Meds  Med Hx  Surg Hx  Fam Hx  Soc Hx      Reviewed and updated as needed this visit by Provider         ROS:  Constitutional, HEENT, cardiovascular, pulmonary, GI, , musculoskeletal, neuro, skin, endocrine and psych systems are negative, except as otherwise noted.      OBJECTIVE:   /73  Pulse 72  Temp 96.6  F (35.9  C) (Oral)  Resp 16  Ht 5' 8.5\" (1.74 m)  Wt 184 lb (83.5 kg)  SpO2 97%  BMI 27.57 kg/m2  Body mass index is 27.57 kg/(m^2).  GENERAL: healthy, alert and no distress  EYES: Eyes grossly normal to inspection, PERRL and conjunctivae and sclerae normal  NECK: no adenopathy, no asymmetry, masses, or scars and thyroid normal to palpation  MS: no gross musculoskeletal defects noted, no edema  SKIN: no suspicious lesions EXCEPT there is tinea pedis on the left foot     Diagnostic Test Results:  none     ASSESSMENT/PLAN:       1. Tinea pedis of left foot  Discussed treatment   - terbinafine (LAMISIL AT) 1 % cream; Apply topically 2 times daily For fungal infection not resolved with other antifungals (e.g. Clotrimazole)  Dispense: 80 g; Refill: 1  - PODIATRY/FOOT & ANKLE SURGERY REFERRAL  If this does not help then he would need to see podiatry , referral given   2. Elevated fasting glucose  He has had elevated glucose in the past , would need to check fasting glucose but also will check HGB A 1 C " , he does have a FH of Dm, also on some meds that increase his glucose levels , per his psychiatry treatment .  - Glucose  - Hemoglobin A1c    RTC if no improving or worsening.  Pt is aware  and comfortable with the current plan.      Tatiana Juan MD  [facility]

## 2018-01-10 LAB — GLUCOSE SERPL-MCNC: 96 MG/DL (ref 70–99)

## 2018-02-28 ENCOUNTER — OFFICE VISIT (OUTPATIENT)
Dept: FAMILY MEDICINE | Facility: CLINIC | Age: 28
End: 2018-02-28
Payer: MEDICAID

## 2018-02-28 VITALS
RESPIRATION RATE: 17 BRPM | TEMPERATURE: 98.2 F | HEIGHT: 69 IN | SYSTOLIC BLOOD PRESSURE: 108 MMHG | DIASTOLIC BLOOD PRESSURE: 68 MMHG | OXYGEN SATURATION: 100 % | HEART RATE: 89 BPM | WEIGHT: 189.3 LBS | BODY MASS INDEX: 28.04 KG/M2

## 2018-02-28 DIAGNOSIS — M79.662 PAIN IN SHIN, LEFT: Primary | ICD-10-CM

## 2018-02-28 PROCEDURE — 99214 OFFICE O/P EST MOD 30 MIN: CPT | Performed by: FAMILY MEDICINE

## 2018-02-28 NOTE — NURSING NOTE
"Chief Complaint   Patient presents with     Musculoskeletal Problem     Right leg pain x1 week       Initial /68  Pulse 89  Temp 98.2  F (36.8  C) (Oral)  Resp 17  Ht 5' 8.5\" (1.74 m)  Wt 189 lb 4.8 oz (85.9 kg)  SpO2 100%  BMI 28.36 kg/m2 Estimated body mass index is 28.36 kg/(m^2) as calculated from the following:    Height as of this encounter: 5' 8.5\" (1.74 m).    Weight as of this encounter: 189 lb 4.8 oz (85.9 kg).  Medication Reconciliation: complete    Health Maintenance Due Pending Provider Review:  NONE    n/a    Eda Bahena MA  [facility]  "

## 2018-02-28 NOTE — MR AVS SNAPSHOT
After Visit Summary   2/28/2018    Jerrod Solis    MRN: [id number]           Patient Information     Date Of Birth          1990        Visit Information        Provider Department      2/28/2018 10:30 AM Tatiana Juan MD [facility]        Today's Diagnoses     Pain in shin, left    -  1       Follow-ups after your visit        Additional Services     SPORTS MEDICINE REFERRAL       Your provider has referred you to:  [facility]: Sports Medicine [facility] [phone]   [email or web address]    Please be aware that coverage of these services is subject to the terms and limitations of your health insurance plan.  Call member services at your health plan with any benefit or coverage questions.      Please bring the following to your appointment:    >>   Any x-rays, CTs or MRIs which have been performed.  Contact the facility where they were done to arrange for  prior to your scheduled appointment.    >>   List of current medications   >>   This referral request   >>   Any documents/labs given to you for this referral                  Who to contact     If you have questions or need follow up information about today's clinic visit or your schedule please contact [facility] directly at [phone].  Normal or non-critical lab and imaging results will be communicated to you by MyChart, letter or phone within 4 business days after the clinic has received the results. If you do not hear from us within 7 days, please contact the clinic through Foru[employer] or phone. If you have a critical or abnormal lab result, we will notify you by phone as soon as possible.  Submit refill requests through [employer] or call your pharmacy and they will forward the refill request to us. Please allow 3 business days for your refill to be completed.          Additional Information About Your Visit        Foruforeverhart Information     [employer] lets you send  "messages to your doctor, view your test results, renew your prescriptions, schedule appointments and more. To sign up, go to [email or web address] . Click on \"Log in\" on the left side of the screen, which will take you to the Welcome page. Then click on \"Sign up Now\" on the right side of the page.     You will be asked to enter the access code listed below, as well as some personal information. Please follow the directions to create your username and password.     Your access code is: [email or web address]  Expires: 2018 11:09 AM     Your access code will  in 90 days. If you need help or a new code, please call your [place] clinic or [phone].        Care EveryWhere ID     This is your Care EveryWhere ID. This could be used by other organizations to access your [place] medical records  [id number]        Your Vitals Were     Pulse Temperature Respirations Height Pulse Oximetry BMI (Body Mass Index)    89 98.2  F (36.8  C) (Oral) 17 5' 8.5\" (1.74 m) 100% 28.36 kg/m2       Blood Pressure from Last 3 Encounters:   18 108/68   18 131/73   17 124/80    Weight from Last 3 Encounters:   18 189 lb 4.8 oz (85.9 kg)   18 184 lb (83.5 kg)   17 180 lb (81.6 kg)              We Performed the Following     SPORTS MEDICINE REFERRAL        Primary Care Provider Office Phone # Fax #    Tatiana Juan [phone] [address] [address]        Equal Access to Services     [facility] CenterROGERS : Hadii elle Bazzi, rosita birch, qayudelka kaalmastas brady, araseli carrero. So [facility] [phone].    ATENCIÓN: Si habla español, tiene a rock disposición servicios gratuitos de asistencia lingüística. Llame al [phone].    We comply with applicable federal civil rights laws and [place] laws. We do not discriminate on the basis of race, color, national origin, age, disability, sex, sexual orientation, or gender " identity.            Thank you!     Thank you for choosing [facility]  for your care. Our goal is always to provide you with excellent care. Hearing back from our patients is one way we can continue to improve our services. Please take a few minutes to complete the written survey that you may receive in the mail after your visit with us. Thank you!             Your Updated Medication List - Protect others around you: Learn how to safely use, store and throw away your medicines at www.disposemymeds.org.          This list is accurate as of 2/28/18 11:04 AM.  Always use your most recent med list.                   Brand Name Dispense Instructions for use Diagnosis    ABILIFY 10 MG tablet   Generic drug:  ARIPiprazole      Take 10 mg by mouth daily        buPROPion 300 MG 24 hr tablet    WELLBUTRIN XL     every morning        levothyroxine 50 MCG tablet    SYNTHROID/LEVOTHROID    90 tablet    TAKE 1 TABLET BY MOUTH DAILY    Hypothyroidism, unspecified type       lithium 300 MG CR tablet    ESKALITH/LITHOBID          * OLANZapine 5 MG tablet    zyPREXA     Take 5 mg by mouth At Bedtime Pt taking 1/2 tab ( 2.5 mg) daily at bedtime        * OLANZapine 2.5 MG tablet    zyPREXA          omeprazole 20 MG CR capsule    priLOSEC    90 capsule    TAKE 1 CAPSULE BY MOUTH DAILY (30-60 MINUTE(S) BEFORE A MEAL)    Gastroesophageal reflux disease without esophagitis       terbinafine 1 % cream    lamISIL AT    80 g    Apply topically 2 times daily For fungal infection not resolved with other antifungals (e.g. Clotrimazole)    Tinea pedis of left foot       triamcinolone 0.1 % cream    KENALOG    15 g    Apply sparingly to affected area two times daily for 14 days.    Rash       * Notice:  This list has 2 medication(s) that are the same as other medications prescribed for you. Read the directions carefully, and ask your doctor or other care provider to review them with you.

## 2018-02-28 NOTE — PROGRESS NOTES
SUBJECTIVE:   Jerrod Solis is a 27 year old male who presents to clinic today for the following health issues:      Musculoskeletal problem/pain      Duration: 1 week     Description  Location: Right leg    Intensity:  mild    Accompanying signs and symptoms: none    History  Previous similar problem: no   Previous evaluation:  none    Precipitating or alleviating factors:  Trauma or overuse: no   Aggravating factors include: hurts often when standing up    Therapies tried and outcome: nothing          Problem list and histories reviewed & adjusted, as indicated.  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 No pertinent surgical history.    Social History   Substance Use Topics     Smoking status: Former Smoker     Smokeless tobacco: Never Used     Alcohol use No     Family History   Problem Relation Age of Onset     DIABETES No family hx of      Coronary Artery Disease No family hx of      Hypertension No family hx of      Hyperlipidemia No family hx of      CEREBROVASCULAR DISEASE No family hx of      Breast Cancer No family hx of      Colon Cancer No family hx of      Prostate Cancer No family hx of      Other Cancer No family hx of      Depression No family hx of      Anxiety Disorder No family hx of      MENTAL ILLNESS No family hx of      Substance Abuse No family hx of      Anesthesia Reaction No family hx of      Asthma No family hx of      OSTEOPOROSIS No family hx  of      Genetic Disorder No family hx of      Thyroid Disease No family hx of      Obesity No family hx of      Unknown/Adopted No family hx of          Current Outpatient Prescriptions   Medication Sig Dispense Refill     terbinafine (LAMISIL AT) 1 % cream Apply topically 2 times daily For fungal infection not resolved with other antifungals (e.g. Clotrimazole) 80 g 1     OLANZapine (ZYPREXA) 2.5 MG tablet        omeprazole (PRILOSEC) 20 MG CR capsule TAKE 1 CAPSULE BY MOUTH DAILY (30-60 MINUTE(S) BEFORE A MEAL) 90 capsule 3     levothyroxine (SYNTHROID/LEVOTHROID) 50 MCG tablet TAKE 1 TABLET BY MOUTH DAILY 90 tablet 3     triamcinolone (KENALOG) 0.1 % cream Apply sparingly to affected area two times daily for 14 days. 15 g 0     OLANZapine (ZYPREXA) 5 MG tablet Take 5 mg by mouth At Bedtime Pt taking 1/2 tab ( 2.5 mg) daily at bedtime       ABILIFY 10 MG tablet Take 10 mg by mouth daily        lithium (ESKALITH/LITHOBID) 300 MG CR tablet        buPROPion (WELLBUTRIN XL) 300 MG 24 hr tablet every morning       Allergies   Allergen Reactions     Depakote [Valproic Acid]      Ibuprofen      Pt on [place] and CANNOT take ibuprofen with this medication     Recent Labs   Lab Test  01/09/18   1116  11/14/17   1139  11/13/17   1257  01/11/17   1516  12/14/16   1335  10/19/16   1619  05/25/16   1145   11/18/15   1522  10/14/15   1320   A1C  5.0   --    --    --    --    --   5.4   --    --   5.5   LDL   --    --    --    --   131*   --   131*   --   150*   --    HDL   --    --    --    --   72   --   69   --   78   --    TRIG   --    --    --    --   113   --   112   --   157*   --    ALT   --    --   35   --    --   33   --    --   27  28   CR   --    --   1.27*   --    --   1.14  1.15   --   1.09  1.17   GFRESTIMATED   --    --   69   --    --   78  78   --   83  77   GFRESTBLACK   --    --   83   --    --   >90   GFR Calc    >90   GFR Calc     --   >90   GFR Calc     ">90   GFR Calc     POTASSIUM   --    --   3.8   --    --   4.1  4.1   --   4.2  4.0   TSH   --   0.57   --   0.54   --   0.02*  2.04   < >  0.91   --     < > = values in this interval not displayed.      BP Readings from Last 3 Encounters:   02/28/18 108/68   01/09/18 131/73   11/14/17 124/80    Wt Readings from Last 3 Encounters:   02/28/18 189 lb 4.8 oz (85.9 kg)   01/09/18 184 lb (83.5 kg)   11/14/17 180 lb (81.6 kg)                  Labs reviewed in EPIC    Reviewed and updated as needed this visit by clinical staff  Tobacco  Allergies  Meds  Med Hx  Surg Hx  Fam Hx  Soc Hx      Reviewed and updated as needed this visit by Provider         ROS:  Constitutional, HEENT, cardiovascular, pulmonary, GI, , musculoskeletal, neuro, skin, endocrine and psych systems are negative, except as otherwise noted.    OBJECTIVE:     /68  Pulse 89  Temp 98.2  F (36.8  C) (Oral)  Resp 17  Ht 5' 8.5\" (1.74 m)  Wt 189 lb 4.8 oz (85.9 kg)  SpO2 100%  BMI 28.36 kg/m2  Body mass index is 28.36 kg/(m^2).  GENERAL: healthy, alert and no distress  EYES: Eyes grossly normal to inspection, PERRL and conjunctivae and sclerae normal  NECK: no adenopathy, no asymmetry, masses, or scars and thyroid normal to palpation  RESP: lungs clear to auscultation - no rales, rhonchi or wheezes  CV: regular rate and rhythm, normal S1 S2, no S3 or S4, no murmur, click or rub, no peripheral edema and peripheral pulses strong  MS: no gross musculoskeletal defects noted, no edema EXCEPT there is some tenderness with palpation in the left shin front are but there is no edema , no erythema , full ROM in the knee and is able to bear weight , no issues with the weight bearing .    Diagnostic Test Results:  No results found for this or any previous visit (from the past 24 hour(s)).    ASSESSMENT/PLAN:             1. Pain in shin, left  We discussed rest from the gym and basketball playing as he just recently re started to play " basketball, also ice a few x a day for total of 10 min , also Ibuprofen for the pain and if there Is no improvement in the next 7 to 10 days will make an appointment with ortho and I have given him a referral for this , Pt is aware  and comfortable with the current plan.    - SPORTS MEDICINE REFERRAL    RTC if no improving or worsening.      Tatiana Juan MD  [facility]

## 2018-03-12 ENCOUNTER — TELEPHONE (OUTPATIENT)
Dept: FAMILY MEDICINE | Facility: CLINIC | Age: 28
End: 2018-03-12

## 2018-03-12 DIAGNOSIS — F90.9 ATTENTION DEFICIT HYPERACTIVITY DISORDER (ADHD), UNSPECIFIED ADHD TYPE: Primary | ICD-10-CM

## 2018-03-12 DIAGNOSIS — F06.30 MOOD DISORDER IN CONDITIONS CLASSIFIED ELSEWHERE: ICD-10-CM

## 2018-03-12 NOTE — TELEPHONE ENCOUNTER
Colleen- Can you send this to another physician to do the referral so it can get done right away.  Tatiana is out today. The physician has to do the referral since the [facility] requires a referral to make appt.  Thanks    Fabiola Richards  Referral Coordinator

## 2018-03-12 NOTE — TELEPHONE ENCOUNTER
Reason for Call: Request for an order or referral:    Order or referral being requested: Asking for a Referral to see a [facility] Neuro psych     Date needed: as soon as possible    Has the patient been seen by the PCP for this problem? YES    Additional comments: call once addressed    Phone number Patient can be reached at:  [phone] (dad) Lola    Best Time:  anytime    Can we leave a detailed message on this number?  YES    Call taken on 3/12/2018 at 12:42 PM by Anival Jones

## 2018-04-19 ENCOUNTER — TELEPHONE (OUTPATIENT)
Dept: FAMILY MEDICINE | Facility: CLINIC | Age: 28
End: 2018-04-19

## 2018-04-19 ENCOUNTER — NURSE TRIAGE (OUTPATIENT)
Dept: NURSING | Facility: CLINIC | Age: 28
End: 2018-04-19

## 2018-04-19 NOTE — TELEPHONE ENCOUNTER
Dad and guardian calling to ask Dr. Juan address something in clinic, as group home staff will be bringing Jerrod in tomorrow (4/20/18 at 11:00 am) for appt.  Dad would like Dr. Juan to address his hand tremors, wondering if any meds would be able to help?  Would a referral be recommended?  States Jerrod over the years has been on antipsychotic meds with known side effects of tardive dyskinesia.  States Jerrod has been taken off all anti psych meds, and has had the hand tremors also for years.     Thanks  Umm Diaz RN  [facility]

## 2018-04-19 NOTE — TELEPHONE ENCOUNTER
See telephone encounter.    Reason for Disposition    [1] Caller requesting NON-URGENT health information AND [2] PCP's office is the best resource    Protocols used: INFORMATION ONLY CALL-ADULT-

## 2018-04-20 ENCOUNTER — OFFICE VISIT (OUTPATIENT)
Dept: FAMILY MEDICINE | Facility: CLINIC | Age: 28
End: 2018-04-20
Payer: MEDICAID

## 2018-04-20 VITALS
OXYGEN SATURATION: 97 % | WEIGHT: 193 LBS | SYSTOLIC BLOOD PRESSURE: 136 MMHG | HEART RATE: 63 BPM | BODY MASS INDEX: 28.58 KG/M2 | TEMPERATURE: 97.6 F | HEIGHT: 69 IN | DIASTOLIC BLOOD PRESSURE: 87 MMHG

## 2018-04-20 DIAGNOSIS — M25.572 PAIN IN JOINT, ANKLE AND FOOT, LEFT: ICD-10-CM

## 2018-04-20 DIAGNOSIS — M25.562 ACUTE PAIN OF LEFT KNEE: ICD-10-CM

## 2018-04-20 DIAGNOSIS — R25.1 TREMOR: Primary | ICD-10-CM

## 2018-04-20 PROCEDURE — 99214 OFFICE O/P EST MOD 30 MIN: CPT | Performed by: FAMILY MEDICINE

## 2018-04-20 NOTE — MR AVS SNAPSHOT
After Visit Summary   4/20/2018    Jerrod Solis    MRN: [id number]           Patient Information     Date Of Birth          1990        Visit Information        Provider Department      4/20/2018 11:00 AM Tatiana Juan MD [facility]        Today's Diagnoses     Tremor    -  1    Pain in joint, ankle and foot, left        Acute pain of left knee           Follow-ups after your visit        Additional Services     [facility] PT, HAND, AND CHIROPRACTIC REFERRAL       **This order will print in the [facility] Scheduling Office**    Physical Therapy, Hand Therapy and Chiropractic Care are available through:    *[facility]  *[facility]  *[facility]    Call one number to schedule at any of the above locations: [phone].    Your provider has referred you to: Physical Therapy at [facility] or [facility]    Indication/Reason for Referral: left knee   Onset of Illness: months now worse   Therapy Orders: Evaluate and Treat  Special Programs: None  Special Request: None    Sabas Obregon      Additional Comments for the Therapist or Chiropractor:     Please be aware that coverage of these services is subject to the terms and limitations of your health insurance plan.  Call member services at your health plan with any benefit or coverage questions.      Please bring the following to your appointment:    *Your personal calendar for scheduling future appointments  *Comfortable clothing            NEUROLOGY ADULT REFERRAL       Your provider has referred you for the following:   Consult at [facility]: Neurology Clinic - [place] [phone]   [email or web address]  General Neurology    Please be aware that coverage of these services is subject to the terms and limitations of your health insurance plan.  Call member services at your health plan with any benefit or coverage questions.      Please bring the following with you to your  "appointment:    (1) Any X-Rays, CTs or MRIs which have been performed.  Contact the facility where they were done to arrange for  prior to your scheduled appointment.    (2) List of current medications  (3) This referral request   (4) Any documents/labs given to you for this referral                  Your next 10 appointments already scheduled     Apr 30, 2018 12:30 PM CDT   (Arrive by 12:15 PM)   Neuropsych Eval with Randy Wilson, PhD   [facility] ([facility])    [address] [place] [address]  3rd [facility] [address] [phone]              Who to contact     If you have questions or need follow up information about today's clinic visit or your schedule please contact [facility] directly at [phone].  Normal or non-critical lab and imaging results will be communicated to you by MyChart, letter or phone within 4 business days after the clinic has received the results. If you do not hear from us within 7 days, please contact the clinic through MyChart or phone. If you have a critical or abnormal lab result, we will notify you by phone as soon as possible.  Submit refill requests through [employer] or call your pharmacy and they will forward the refill request to us. Please allow 3 business days for your refill to be completed.          Additional Information About Your Visit        [employer] Information     [employer] lets you send messages to your doctor, view your test results, renew your prescriptions, schedule appointments and more. To sign up, go to [email or web address] . Click on \"Log in\" on the left side of the screen, which will take you to the Welcome page. Then click on \"Sign up Now\" on the right side of the page.     You will be asked to enter the access code listed below, as well as some personal information. Please follow the directions to create your username and password.     Your access code is: [email or web address]  Expires: 7/15/2018  " "6:30 AM     Your access code will  in 90 days. If you need help or a new code, please call your [facility] or [phone].        Care EveryWhere ID     This is your Care EveryWhere ID. This could be used by other organizations to access your [place] medical records  [id number]        Your Vitals Were     Pulse Temperature Height Pulse Oximetry BMI (Body Mass Index)       63 97.6  F (36.4  C) (Oral) 5' 8.5\" (1.74 m) 97% 28.92 kg/m2        Blood Pressure from Last 3 Encounters:   18 136/87   18 108/68   18 131/73    Weight from Last 3 Encounters:   18 193 lb (87.5 kg)   18 189 lb 4.8 oz (85.9 kg)   18 184 lb (83.5 kg)              We Performed the Following     [facility] PT, HAND, AND CHIROPRACTIC REFERRAL     NEUROLOGY ADULT REFERRAL        Primary Care Provider Office Phone # Fax #    Tatiana Juan [phone]       [address] [address]        Equal Access to Services     [facility] : Hadii aad ku hadasho Soomaali, waaxda luqadaha, qaybta kaalmada adeegyada, waxay gurjit haybearn lee ch ah. So [facility] [phone].    ATENCIÓN: Si habla español, tiene a rock disposición servicios gratuitos de asistencia lingüística. Llame al [phone].    We comply with applicable federal civil rights laws and [place] laws. We do not discriminate on the basis of race, color, national origin, age, disability, sex, sexual orientation, or gender identity.            Thank you!     Thank you for choosing [facility]  for your care. Our goal is always to provide you with excellent care. Hearing back from our patients is one way we can continue to improve our services. Please take a few minutes to complete the written survey that you may receive in the mail after your visit with us. Thank you!             Your Updated Medication List - Protect others around you: Learn how to safely use, store and throw away your medicines at " www.disposemymeds.org.          This list is accurate as of 4/20/18  1:16 PM.  Always use your most recent med list.                   Brand Name Dispense Instructions for use Diagnosis    ABILIFY 10 MG tablet   Generic drug:  ARIPiprazole      Take 10 mg by mouth daily        buPROPion 300 MG 24 hr tablet    WELLBUTRIN XL     every morning        levothyroxine 50 MCG tablet    SYNTHROID/LEVOTHROID    90 tablet    TAKE 1 TABLET BY MOUTH DAILY    Hypothyroidism, unspecified type       lithium 300 MG CR tablet    ESKALITH/LITHOBID          * OLANZapine 5 MG tablet    zyPREXA     Take 5 mg by mouth At Bedtime Pt taking 1/2 tab ( 2.5 mg) daily at bedtime        * OLANZapine 2.5 MG tablet    zyPREXA          omeprazole 20 MG CR capsule    priLOSEC    90 capsule    TAKE 1 CAPSULE BY MOUTH DAILY (30-60 MINUTE(S) BEFORE A MEAL)    Gastroesophageal reflux disease without esophagitis       terbinafine 1 % cream    lamISIL AT    80 g    Apply topically 2 times daily For fungal infection not resolved with other antifungals (e.g. Clotrimazole)    Tinea pedis of left foot       triamcinolone 0.1 % cream    KENALOG    15 g    Apply sparingly to affected area two times daily for 14 days.    Rash       * Notice:  This list has 2 medication(s) that are the same as other medications prescribed for you. Read the directions carefully, and ask your doctor or other care provider to review them with you.

## 2018-04-20 NOTE — NURSING NOTE
"Chief Complaint   Patient presents with     Pain   neurology referral  Tremors    initial /87 (BP Location: Left arm, Cuff Size: Adult Regular)  Pulse 63  Temp 97.6  F (36.4  C) (Oral)  Ht 5' 8.5\" (1.74 m)  Wt 193 lb (87.5 kg)  SpO2 97%  BMI 28.92 kg/m2 Estimated body mass index is 28.92 kg/(m^2) as calculated from the following:    Height as of this encounter: 5' 8.5\" (1.74 m).    Weight as of this encounter: 193 lb (87.5 kg).  BP completed using cuff size: regular.  L  arm      Health Maintenance that is potentially due pending provider review:  NONE    n/a    Walt Munson ma  "

## 2018-04-20 NOTE — PROGRESS NOTES
SUBJECTIVE:   Jerrod Solis is a 27 year old male who presents to clinic today for the following health issues:      1)Leg/ankle pains, patient has been playing basketball over the last few years, has had a few injuries to his ankles and knees.  He has been complaining of pain in his left knee.  There is no swelling, is able to bear weight, no redness but when walking up and down stairs the knee hurts also with stretching there is some pain just under the kneecap.  No recent injuries of this knee but he is physically active.  2)referral for neurology and tremors.  He sees psychiatrist it used to be Dr. Almazan, now is a different psychiatrist in the same group, she sees them about every 3 months.  He is on lithium and has been the last few years, also on Zyprexa and Wellbutrin.  Staff and mostly his dad and guardian are concerned about tremors in his hands.  Patient states that does not bother him but occasionally she has fine tremors in his hands.  It has not been evaluated by a neurologist.  He denies any changes in vision, no headaches, no tremor of the head.  Denies any muscle weakness or numbness and tingling.        Problem list and histories reviewed & adjusted, as indicated.  Additional history: as documented    Patient Active Problem List   Diagnosis     Disturbance of conduct     Attention deficit hyperactivity disorder (ADHD)     Traumatic shock (H)     Alcohol affecting fetus or  via placenta or breast milk     Mood disorder in conditions classified elsewhere     Oppositional defiant disorder     CARDIOVASCULAR SCREENING; LDL GOAL LESS THAN 160     Hypothyroidism     Helicobacter positive gastritis     Hypothyroidism due to medication     Hypothyroidism, unspecified type     Gastroesophageal reflux disease without esophagitis     No past surgical history on file.    Social History   Substance Use Topics     Smoking status: Former Smoker     Smokeless tobacco: Never Used     Alcohol use No      Family History   Problem Relation Age of Onset     DIABETES No family hx of      Coronary Artery Disease No family hx of      Hypertension No family hx of      Hyperlipidemia No family hx of      CEREBROVASCULAR DISEASE No family hx of      Breast Cancer No family hx of      Colon Cancer No family hx of      Prostate Cancer No family hx of      Other Cancer No family hx of      Depression No family hx of      Anxiety Disorder No family hx of      MENTAL ILLNESS No family hx of      Substance Abuse No family hx of      Anesthesia Reaction No family hx of      Asthma No family hx of      OSTEOPOROSIS No family hx of      Genetic Disorder No family hx of      Thyroid Disease No family hx of      Obesity No family hx of      Unknown/Adopted No family hx of          Current Outpatient Prescriptions   Medication Sig Dispense Refill     ABILIFY 10 MG tablet Take 10 mg by mouth daily        buPROPion (WELLBUTRIN XL) 300 MG 24 hr tablet every morning       levothyroxine (SYNTHROID/LEVOTHROID) 50 MCG tablet TAKE 1 TABLET BY MOUTH DAILY 90 tablet 3     lithium (ESKALITH/LITHOBID) 300 MG CR tablet        OLANZapine (ZYPREXA) 2.5 MG tablet        OLANZapine (ZYPREXA) 5 MG tablet Take 5 mg by mouth At Bedtime Pt taking 1/2 tab ( 2.5 mg) daily at bedtime       omeprazole (PRILOSEC) 20 MG CR capsule TAKE 1 CAPSULE BY MOUTH DAILY (30-60 MINUTE(S) BEFORE A MEAL) 90 capsule 3     terbinafine (LAMISIL AT) 1 % cream Apply topically 2 times daily For fungal infection not resolved with other antifungals (e.g. Clotrimazole) 80 g 1     triamcinolone (KENALOG) 0.1 % cream Apply sparingly to affected area two times daily for 14 days. 15 g 0     Allergies   Allergen Reactions     Depakote [Valproic Acid]      Ibuprofen      Pt on [place] and CANNOT take ibuprofen with this medication     Recent Labs   Lab Test  01/09/18   1116  11/14/17   1139  11/13/17   1257  01/11/17   1516  12/14/16   1335  10/19/16   1619  05/25/16   1145   11/18/15    "1522  10/14/15   1320   A1C  5.0   --    --    --    --    --   5.4   --    --   5.5   LDL   --    --    --    --   131*   --   131*   --   150*   --    HDL   --    --    --    --   72   --   69   --   78   --    TRIG   --    --    --    --   113   --   112   --   157*   --    ALT   --    --   35   --    --   33   --    --   27  28   CR   --    --   1.27*   --    --   1.14  1.15   --   1.09  1.17   GFRESTIMATED   --    --   69   --    --   78  78   --   83  77   GFRESTBLACK   --    --   83   --    --   >90   GFR Calc    >90   GFR Calc     --   >90   GFR Calc    >90   GFR Calc     POTASSIUM   --    --   3.8   --    --   4.1  4.1   --   4.2  4.0   TSH   --   0.57   --   0.54   --   0.02*  2.04   < >  0.91   --     < > = values in this interval not displayed.      BP Readings from Last 3 Encounters:   04/20/18 136/87   02/28/18 108/68   01/09/18 131/73    Wt Readings from Last 3 Encounters:   04/20/18 193 lb (87.5 kg)   02/28/18 189 lb 4.8 oz (85.9 kg)   01/09/18 184 lb (83.5 kg)                  Labs reviewed in EPIC    Reviewed and updated as needed this visit by clinical staff  Tobacco  Allergies  Meds       Reviewed and updated as needed this visit by Provider         ROS:  Constitutional, HEENT, cardiovascular, pulmonary, GI, , musculoskeletal, neuro, skin, endocrine and psych systems are negative, except as otherwise noted.    OBJECTIVE:     /87 (BP Location: Left arm, Cuff Size: Adult Regular)  Pulse 63  Temp 97.6  F (36.4  C) (Oral)  Ht 5' 8.5\" (1.74 m)  Wt 193 lb (87.5 kg)  SpO2 97%  BMI 28.92 kg/m2  Body mass index is 28.92 kg/(m^2).  GENERAL: healthy, alert and no distress  EYES: Eyes grossly normal to inspection, PERRL and conjunctivae and sclerae normal  NECK: no adenopathy, no asymmetry, masses, or scars and thyroid normal to palpation  RESP: lungs clear to auscultation - no rales, rhonchi or wheezes  CV: regular rate and " rhythm, normal S1 S2, no S3 or S4, no murmur, click or rub, no peripheral edema and peripheral pulses strong  ABDOMEN: soft, nontender, no hepatosplenomegaly, no masses and bowel sounds normal  MS: no gross musculoskeletal defects noted, no edema EXCEPT  NEURO: Normal strength and tone, mentation intact and speech normal    Diagnostic Test Results:  No results found for this or any previous visit (from the past 24 hour(s)).    ASSESSMENT/PLAN:         1. Tremor  We discussed referral to neurology, with tremor getting worse lately, although he is on lithium, and this is one side effect of lithium.  On the other hand lithium has been controlling his psychiatric symptoms very well and is within therapeutic range.  He sees psychiatrist for that every 3 months.  Referral for neurology given per his dad's request.  - NEUROLOGY ADULT REFERRAL    2. Pain in joint, ankle and foot, left  Seems that ankles are not bothering him currently, he is able to bear weight, there is no edema or erythema.  He has had several ankle sprains in the past because of being physically active and playing basketball a lot.    3. Acute pain of left knee  Left knee has been aching especially with walking up and down the stairs, there is no swelling.  Discussed ibuprofen 600 mg every 6 hours as needed, ice packs 10-15 minutes at a time to 3 times a day, can alternate with heating pads at bedtime or a few times a day.  Discussed rest and elevated leg at night.  I did give him a referral for physical therapy for [place] [facility] and they will call him to set up the first appointment  - [facility] PT, HAND, AND CHIROPRACTIC REFERRAL    RTC if no improving or worsening.      Tatiana Juan MD  [facility]

## 2018-04-27 ENCOUNTER — TELEPHONE (OUTPATIENT)
Dept: FAMILY MEDICINE | Facility: CLINIC | Age: 28
End: 2018-04-27

## 2018-04-27 NOTE — TELEPHONE ENCOUNTER
Received form(s) from [facility] for standing medication orders.  Placed form(s) in/on LS's box.  Forms need to be signed and faxed to number listed on form..    Call pt to verify form was sent: No  Copy needs to be sent for scanning after completion: Yes    Joie Sexton CMA

## 2018-04-30 ENCOUNTER — OFFICE VISIT (OUTPATIENT)
Dept: NEUROPSYCHOLOGY | Facility: CLINIC | Age: 28
End: 2018-04-30
Payer: MEDICAID

## 2018-04-30 DIAGNOSIS — F06.30 MOOD DISORDER IN CONDITIONS CLASSIFIED ELSEWHERE: ICD-10-CM

## 2018-04-30 DIAGNOSIS — F70 MILD INTELLECTUAL DISABILITY: Primary | ICD-10-CM

## 2018-04-30 DIAGNOSIS — R45.851 SUICIDAL IDEATION: ICD-10-CM

## 2018-04-30 NOTE — MR AVS SNAPSHOT
After Visit Summary   2018    Jerrod Solis    MRN: [id number]           Patient Information     Date Of Birth          1990        Visit Information        Provider Department      2018 12:30 PM Randy Wilson, PhD [facility] Neuropsychology        Today's Diagnoses     Mild intellectual disability    -  1    Mood disorder in conditions classified elsewhere        Suicidal ideation           Follow-ups after your visit        Your next 10 appointments already scheduled     May 07, 2018 10:50 AM CDT   (Arrive by 10:35 AM)   KAREN Extremity with Troy Palma PT   [facility]    [address] [facility] [id number] [phone]            May 08, 2018  9:30 AM CDT   (Arrive by 9:15 AM)   New Movement Disorder with Jermaine Lawler MD   [facility] Neurology ([facility])    [address] [place] [address] [phone]              Who to contact     Please call your clinic at [phone] to:    Ask questions about your health    Make or cancel appointments    Discuss your medicines    Learn about your test results    Speak to your doctor            Additional Information About Your Visit        MyChart Information     KAICOREhart is an electronic gateway that provides easy, online access to your medical records. With Zendesk, you can request a clinic appointment, read your test results, renew a prescription or communicate with your care team.     To sign up for [employer] visit the website at [email or web address]   You will be asked to enter the access code listed below, as well as some personal information. Please follow the directions to create your username and password.     Your access code is: [email or web address]  Expires: 7/15/2018  6:30 AM     Your access code will  in 90 days. If you need help or a new code, please contact your [facility]  [facility] or call [phone] for assistance.        Care EveryWhere ID     This is your Care EveryWhere ID. This could be used by other organizations to access your [facility] medical records  [id number]         Blood Pressure from Last 3 Encounters:   04/20/18 136/87   02/28/18 108/68   01/09/18 131/73    Weight from Last 3 Encounters:   04/20/18 87.5 kg (193 lb)   02/28/18 85.9 kg (189 lb 4.8 oz)   01/09/18 83.5 kg (184 lb)              We Performed the Following     [id number] TESTING, PER HR/PSYCHOLOGIST     NEUROPSYCH TESTING BY [facility]        Primary Care Provider Office Phone # Fax #    Tatiana Juan [phone] [address] [address]        Equal Access to Services     DAYNE TURNER : Hadii aad kristan hadasho Soomaali, waaxda luqadaha, qaybta kaalmada adeaundreayastas, araseli ch . So [facility] [phone].    ATENCIÓN: Si habla español, tiene a rock disposición servicios gratuitos de asistencia lingüística. Aislinn al [phone].    We comply with applicable federal civil rights laws and [place] laws. We do not discriminate on the basis of race, color, national origin, age, disability, sex, sexual orientation, or gender identity.            Thank you!     Thank you for choosing [facility] NEUROPSYCHOLOGY  for your care. Our goal is always to provide you with excellent care. Hearing back from our patients is one way we can continue to improve our services. Please take a few minutes to complete the written survey that you may receive in the mail after your visit with us. Thank you!             Your Updated Medication List - Protect others around you: Learn how to safely use, store and throw away your medicines at www.disposemymeds.org.          This list is accurate as of 4/30/18 11:59 PM.  Always use your most recent med list.                   Brand Name Dispense Instructions for use Diagnosis    ABILIFY 10 MG tablet    Generic drug:  ARIPiprazole      Take 10 mg by mouth daily        buPROPion 300 MG 24 hr tablet    WELLBUTRIN XL     every morning        levothyroxine 50 MCG tablet    SYNTHROID/LEVOTHROID    90 tablet    TAKE 1 TABLET BY MOUTH DAILY    Hypothyroidism, unspecified type       lithium 300 MG CR tablet    ESKALITH/LITHOBID          * OLANZapine 5 MG tablet    zyPREXA     Take 5 mg by mouth At Bedtime Pt taking 1/2 tab ( 2.5 mg) daily at bedtime        * OLANZapine 2.5 MG tablet    zyPREXA          omeprazole 20 MG CR capsule    priLOSEC    90 capsule    TAKE 1 CAPSULE BY MOUTH DAILY (30-60 MINUTE(S) BEFORE A MEAL)    Gastroesophageal reflux disease without esophagitis       terbinafine 1 % cream    lamISIL AT    80 g    Apply topically 2 times daily For fungal infection not resolved with other antifungals (e.g. Clotrimazole)    Tinea pedis of left foot       triamcinolone 0.1 % cream    KENALOG    15 g    Apply sparingly to affected area two times daily for 14 days.    Rash       * Notice:  This list has 2 medication(s) that are the same as other medications prescribed for you. Read the directions carefully, and ask your doctor or other care provider to review them with you.

## 2018-04-30 NOTE — NURSING NOTE
The patient was seen for neuropsychological evaluation at the request of Roger Saavedra PA-C for the purpose of diagnostic clarification and treatment planning.  2 hours of face to face testing were provided by this writer.  Please see Dr. Randy Wilson's report for a full interpretation of the findings.

## 2018-05-02 NOTE — PROGRESS NOTES
Date: 18  Staff: ELOINA  Tech:       JERALD  NAME:  Jerrod Solis  MRN:  [id number]  :  [birth year]  Age: 27  Sex: Male  Hand: Right     WRAT4   SS %ile Grade Equiv.     Word Reading  55 0.1 1.6     Math Computation 55 0.1 2.9       WAIS-IV     VCI: 66 MEENU: 71      WMI: 55 PSI: 79      FSIQ: 62     Raw SS     Similarities  12 4     Vocabulary  11 4     Information   3 4     Comprehension  8 3     Block Design  20 5     Matrix Reasoning 7 3     Visual Puzzles  10 7     Digit Span  14 3     Arithmetic  3 1     Symbol Search  24 7     Coding  41 5    Animal Naming Test   Raw: 16  SS: 8 T: 45    Clock Drawing: Impaired    Trail Making Test    Raw  Err SS T   A 70  2 4 26   B 142  1 6 35    Letter Cancellation Test   Time: 91   Err: 1    WCST (64 cards)  Raw %ile   Categories: 1 2-5  Total Correct: 28 --   Total Errors: 36 4   Persev. Err.: 32 1   Concept. Resp.: 18 5   FTMS:   1      HVLT-R     Trial 1 2 3   5 4 6 Raw T     Learning (1-3)    15 ?20     Delayed Recall   4 <20     Percent Retention  67 24     Recognition Hits/False Positives 11/2 36    BVMT-R     Trial 1 2 3   2 2 3 Raw T/%ile     Learning (1-3)    7 <20     Delayed Recall   4 <20     Percent Retention  100 >16th     Recognition Hits/False Positives 6/0 >16th     Copy    10    WMS-IV  Raw SS/%ile     LM I  12 4     LM II  6 3     LM Recog. 19 3-9th

## 2018-05-02 NOTE — PROGRESS NOTES
NAME: Jerrod Solis  MRN: [id number]  : 1990  BARRON: 2018     Neuropsychology Laboratory  [facility]  [address] [facility] [address]  [place]  [address] [phone]    NEUROPSYCHOLOGICAL EVALUATION     RELEVANT HISTORY AND REASON FOR REFERRAL     This is a report of neuropsychological consultation regarding Jerrod Solis, a 27-year-old, right-handed man with history of fetal alcohol spectrum disorder, mild intellectual disability (ID), ADHD, reactive attachment disorder, oppositional defiant/conduct disorder, mood disorder, and hypothyroidism. He presents for neuropsychological evaluation because an up-to-date baseline of cognitive functioning is needed to reaffirm eligibility for support services. He is referred by his primary care provider, Tatiana Juan MD.      Mr. Solis was seen for neuropsychological assessment by Dr. Ja Avila in 2005 (age 15) and 2009 (age 18). Dr. Avila  reports contain significant additional information not fully recapitulated here. In , the basic findings were functioning within the range of moderate ID (WISC-IV: VCI=57, MEENU=53, WMI=50, PSI=50, FSIQ=43) and continued issues with externalizing behaviors that included aggression and impulsive behaviors (based on teacher and parent reports). He was noted to be resistant to testing, and the results likely underestimated his capacity. In , his intellectual functioning performances appeared to be improved (mild ID range), which Dr. Avila saw as indication of notably better engagement and effort (WAIS-IV: VCI=68, MEENU=65, WMI=60, PSI=62, FSIQ=58). Significant issues with adaptive behaviors remained, with very limited skills in social interaction, community living, and personal care. He was seen as vulnerable to being taken advantage of by both peers and other adults. Guardianship was encouraged. SSI benefits were seen as needed. Programming through organizations like [facility] was recommended.      At this time, Mr. Solis is living in a semi-independent apartment-style group home through [facility]. There is a staff member available at all times. He is independent for most daily activities but does get help as needed with things like cooking and shopping. Transportation is available. He has a cat, and his father helps with ensuring the cat is well cared for. Mr. Solis is hoping to find a job. 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 Solis chose to resign. He is hoping to try out a position in [employer] and lawn care, via [employer] employment programs.      His adoptive father, Lola Solis, remains his legal guardian. His father tells me that Mr. Solis s emotional and behavioral control has improved quite a bit since his last evaluation. Mood is overall more stable. He is open about continued tendencies for suicidal ideation. He reports that he worries about his future, his ability to get a job, and whether or not he will find a romantic partner, and these worries get overwhelming and provoke reactive suicidal thoughts. He denies having any lasting suicidal desire, planning, or attempts. His father says he is more capable of remaining calm and appropriate under duress. For example, the police came to his group home last year and he was calm, informative, and helpful. In years past, he would have been too agitated by the presence of a  or an emergent situation to act appropriately, let alone helpfully. Mr. Solis is able to rely on his father for support and calls or texts him regularly. He also reports good rapport and therapeutic benefit from the manager at his home, Darrel. He notes that, with most people, being approached  or spoken to when he is upset just further inflames him, but he actually finds being approached by Darrel to be a calming influence. He is close with his brother, but they spend less time together as the brother now has a baby. Listening to music on his headphones is another coping skill.      He gets along with roommates well enough. He likes to play video games. He socializes through a basketball team and has several friends and acquaintances. He is interested in dating but feels like he should first focus on getting a job that is stable and rewarding.     He reports a possible hallucination of hearing a scary sound when alone in his room once, but this was just after watching a scary movie in which the sound was prominently featured. There are no instances of clear hallucinatory experiences.      There is no history of stroke, seizure (does have siblings with seizures), TBI, migraine, or episodes of delirium. His sleep is solid. He is hypersensitive to smells. There are no concerns about his senses of hearing and vision. He has had concerns about longstanding tremor in his hands, which has historically been attributed to medication side effects. Medication changes have not seemed to alter the tremor. He has a pending referral to see a neurologist.      He denies any use of alcohol, tobacco, or illicit drugs.      His current medication list includes lithium, aripiprazole, olanzapine, bupropion, levothyroxine, and omeprazole.     Mr. Solis had an emotional/behavioral disorder IEP and special education services throughout school, including non-mainstreamed settings and with 1:1 teacher and paraprofessional support at times. Since the last neuropsychological evaluation in 2009, Mr. Solis finished high school and then participated in a 3-year program to bolster independent living skills.    Regarding current cognitive functioning, there are still general intellectual challenges. His has lasting problems with reading and  writing, but adaptive applications with [id number] functions on his phone are helpful. His father is not concerned about any sort of cognitive decline or new problem areas.     BEHAVIORAL OBSERVATIONS    Mr. Solis was polite and entirely cooperative with the exam. Mood was neutral. Affective display was mood-congruent. Demeanor was open and engaging, although with an undercurrent of shyness or embarrassment. Given the social communication issues seen in  Margarita  prior assessments, he was quite surprisingly communicative, insightful, and psychologically-minded. He had good eye contact and reciprocal communication skills. He had a good sense of humor. He was open and candid. He demonstrated excellent insight into his history and current care needs. He was able to articulate his feelings and viewpoints quite well in discussions about suicidal ideation and his hopes/fears for the future. There was a mild speech impediment and very minor instances of stuttering, but speech was generally fluent, prosodic, and had a normal rate of paraphasic errors. He was alert and not somnolent. Immediate attentional control was appropriate. He demonstrated normal comprehension of test instructions. He appeared to be engaged with the testing session and generally motivated to do his best. He was quick to give up at times but responded favorably to requests to sustain effort or give his best guesses. The test results are seen as valid estimations of his cognitive status.      MEASURES ADMINISTERED     The following measures were administered by a trained psychometrist, under my direct supervision:     Wide Range Achievement Test 4: Word Reading, Math Computation; Wechsler Adult Intelligence Scale-IV: Similarities, Information, Vocabulary, Block Design, Matrix Reasoning, Visual Puzzles, Digit Span, Arithmetic, Symbol Search, Coding; Animal Naming Test; Renato Visual Acuity Screen; Clock Drawing; Trail Making Test; Letter  Cancellation Test; [place] Card Sorting Test; Garcia Verbal Learning Test, Revised; Brief Visuospatial Memory Test, Revised; Wechsler Memory Scale-IV: Logical Memory.      RESULTS AND INTERPRETATION     Academic Achievement: Performance on a reading, pronunciation, and phonetic decoding skills test was in the very impaired range (0.1 percentile, 1st grade equivalent). Performance on a test of written math skills was commensurately performed in the very impaired range (0.1 percentile, 2nd grade equivalent).      Intellectual Abilities: Verbal intellectual abilities were in the mildly impaired range, with no appreciable variability across subtests (VCI = 66). This included measures of abstract analogical reasoning, general fund of information, and demonstrating vocabulary knowledge. An impaired-range performance was also obtained on a test of practical reasoning, common sense, and social understanding; although this score was not used to calculate VCI and FSIQ, it was commensurate with all other performances.    Nonverbal intellectual abilities were in the borderline impaired range, again with minimal variability across subtests (MEENU = 71). This included measures of hands-on visuospatial reasoning through object assembly, static pattern identification, and mental rotation with visuospatial synthesis. The rotation/synthesis performance was strongest, reaching the low average range for his age.    Immediate auditory attention and working memory were very impaired for digit string repetition/rearrangement and for solving mental math problems (WMI = 55).    Cognitive processing speed was borderline to low average (PSI = 79) for visual scanning and matching to sample and for speeded digit-symbol substitution.    Summing across the verbal, nonverbal, working memory, and processing speed measures, the overall estimate of general intellectual capacity was in the mildly impaired range (FSIQ = 62).    Compared to prior  evaluations, the VCI and WMI scores were generally stable, while MEENU and PSI showed improvements (especially PSI).     Perceptual & Visuoconstructional Skills: Practical near-point visual acuity (i.e., with both eyes together) was 20/30 on Renato screening. Copies of simple shapes and figures were a little loosely organized but did not indicate fundamental perceptual defects. Clock drawing was impaired, as he put the numbers 1-31 around the contour and was unable to place any hands on it to represent a specific time.     Mental Speed & Executive Functioning: As noted above, speed tests from the intellectual abilities battery were performed in the borderline to low average range for his age. Speeded visual scanning and graphomotor sequencing (trail making) under simple conditions was impaired for speed and had 2 errors. In contrast, trail making under more complex demands for controlling divided attention was in the borderline range and only had 1 error. On a dual-target detection task that requires sustained vigilant control over divided attention, his performance was entirely normal. His performance on a test of semantic verbal fluency was within the average range for his age and educational background. Conceptualization, inductive reasoning, and using continuous feedback to guide responses and correct errors was in the mildly impaired to borderline range.     Learning & Anterograde Memory: Immediate verbal memory for short stories was borderline impaired. Delayed free recall of the stories was mildly impaired, and delayed recognition of story details was in the borderline range. Learning a word list over repeated readings was impaired, with a generally flat learning slope. Delayed free recall of the list was in the impaired range, but his performance showed intact retention of words that had been reliably given across learning trials (i.e., what got in stayed in). Delayed recognition memory for the list was in  the borderline range for his age. Learning a visual display of geometric shapes over repeated exposures was in the impaired range and had a flat learning slope. Delayed free recall of the display was impaired in terms of total information but strong in terms of percent retention (100%), and delayed recognition of the figures was fully intact.     IMPRESSIONS AND RECOMMENDATIONS     The neuropsychological results are abnormal. Compared to prior evaluations, there are some areas that suggest mild functional improvements. Most things are pretty stable. He still certainly qualifies for a diagnosis of mild intellectual disability, with an overall FSIQ of 62 and significant needs for support in adaptive living. Cognitive processing speed seems to have improved, with several of his performances in this domain landing in the borderline to average ranges today. Basic academic achievement is very impaired. There are difficulties with executive functioning and problem-solving under speeded and unspeeded conditions, but his executive capacity is not universally impaired. Up-front learning is limited, but he demonstrates strong capacity to retain the information that he was able to attend to and comprehend initially.    As he is considering starting a new line of work, I agree that a supported employment situation would be best. His conversational capacity may lead casual observers to overestimate his cognitive abilities, and all supervisors and colleagues should be made aware that Mr. Solis will need substantial assistance in understanding, carrying out, and mastering new tasks. He will need simplified instructions and would benefit from having a routine task set with limited time pressure and essentially no needs for complex or on-the-fly problem-solving. He will require repetitions of instructions and checks on his comprehension at more than one time point. Supervisors and colleagues should be made aware of the risk for  difficulty with unexpected transitions or changes in routine.     Continued close monitoring of mood and propensities toward suicidal ideation are necessary. I am glad to hear that Mr. Solis has acquired positive and adaptive coping mechanisms over recent years.    This evaluation focused on psychometric assessment of cognition and not psychometric assessment of adaptive functioning, which was covered only in clinical interview. There are many indications that Mr. Solis s psychological, social, and adaptive functioning are notably better now than they were through childhood and adolescence. Through a combination of medications, appropriate living environments, and excellent psychosocial support from family and group home staff, Mr. Solis appears to be nearing his capacity to thrive and lead a productive, fulfilling life. My clinical impression is that all current federal, state, and local support services should be continued. Revocation or major changes in services would risk significant deterioration in functioning. He is still a vulnerable adult, and his father s guardianship is still appropriate.   I do not foresee a need for reevaluation along a prespecified timeline. I would be happy to see Mr. Solis again if significant changes are suspected, or as otherwise clinically indicated.     Randy Wilson, PhD,   Clinical Neuropsychologist       Time spent:  Four hours professional time, including interview, feedback, records review, data integration, and report writing (CPT 10526); an additional two hours, including testing administered by a psychometrist and interpreted by a neuropsychologist (CPT 68644). ICD-10 diagnosis: F70, F06.30, R45.851

## 2018-05-02 NOTE — TELEPHONE ENCOUNTER
Form faxed to [facility] [phone] & copy sent to scanning.    Christin FALLON

## 2018-05-07 ENCOUNTER — THERAPY VISIT (OUTPATIENT)
Dept: PHYSICAL THERAPY | Facility: CLINIC | Age: 28
End: 2018-05-07
Payer: MEDICARE

## 2018-05-07 DIAGNOSIS — M25.562 ACUTE PAIN OF LEFT KNEE: Primary | ICD-10-CM

## 2018-05-07 PROCEDURE — 97110 THERAPEUTIC EXERCISES: CPT | Mod: GP | Performed by: PHYSICAL THERAPIST

## 2018-05-07 PROCEDURE — G8978 MOBILITY CURRENT STATUS: HCPCS | Mod: GP | Performed by: PHYSICAL THERAPIST

## 2018-05-07 PROCEDURE — 97161 PT EVAL LOW COMPLEX 20 MIN: CPT | Mod: GP | Performed by: PHYSICAL THERAPIST

## 2018-05-07 PROCEDURE — G8979 MOBILITY GOAL STATUS: HCPCS | Mod: GP | Performed by: PHYSICAL THERAPIST

## 2018-05-07 NOTE — TELEPHONE ENCOUNTER
FUTURE VISIT INFORMATION      FUTURE VISIT INFORMATION:    Date: 05/08/2018    Time:     Location:   REFERRAL INFORMATION:    Referring provider:  Roger Saavedra PA-C     Referring providers clinic:      Reason for visit/diagnosis        NOTES (FOR ALL VISITS) STATUS DETAILS   OFFICE NOTE from referring provider Internal    OFFICE NOTE from other specialist Internal    DISCHARGE SUMMARY from hospital N/A    DISCHARGE REPORT from the ER Care Everywhere  05/14/17   OPERATIVE REPORT N/A    MEDICATION LIST Internal    IMAGING  (FOR ALL VISITS)     EMG N/A    EEG N/A    ECT N/A    MRI (HEAD, NECK, SPINE) N/A    CT (HEAD, NECK, SPINE) N/A

## 2018-05-07 NOTE — MR AVS SNAPSHOT
"              After Visit Summary   5/7/2018    Jerrod Solis    MRN: [id number]           Patient Information     Date Of Birth          1990        Visit Information        Provider Department      5/7/2018 10:50 AM Troy Palma, PT [facility] PT        Today's Diagnoses     Acute pain of left knee    -  1       Follow-ups after your visit        Your next 10 appointments already scheduled     May 08, 2018  9:30 AM CDT   (Arrive by 9:15 AM)   New Movement Disorder with Jermaine Lawler MD   [facility] Neurology ([facility] [place])    [address] [place] [address] [phone]            May 22, 2018 11:10 AM CDT   KAREN Extremity with Troy Palma PT   [facility] ([facility]    [address] [facility] [address] [phone]              Who to contact     If you have questions or need follow up information about today's clinic visit or your schedule please contact [facility] PT directly at [phone].  Normal or non-critical lab and imaging results will be communicated to you by MyChart, letter or phone within 4 business days after the clinic has received the results. If you do not hear from us within 7 days, please contact the clinic through [employer] or phone. If you have a critical or abnormal lab result, we will notify you by phone as soon as possible.  Submit refill requests through [employer] or call your pharmacy and they will forward the refill request to us. Please allow 3 business days for your refill to be completed.          Additional Information About Your Visit        MyChart Information     [employer] lets you send messages to your doctor, view your test results, renew your prescriptions, schedule appointments and more. To sign up, go to [email or web address] . Click on \"Log in\" on the left side of " "the screen, which will take you to the Welcome page. Then click on \"Sign up Now\" on the right side of the page.     You will be asked to enter the access code listed below, as well as some personal information. Please follow the directions to create your username and password.     Your access code is: [email or web address]  Expires: 7/15/2018  6:30 AM     Your access code will  in 90 days. If you need help or a new code, please call your [place] clinic or [phone].        Care EveryWhere ID     This is your Care EveryWhere ID. This could be used by other organizations to access your [place] medical records  [id number]         Blood Pressure from Last 3 Encounters:   18 136/87   18 108/68   18 131/73    Weight from Last 3 Encounters:   18 87.5 kg (193 lb)   18 85.9 kg (189 lb 4.8 oz)   18 83.5 kg (184 lb)              We Performed the Following     HC PT EVAL, LOW COMPLEXITY     KAREN CERT REPORT     KAERN INITIAL EVAL REPORT     THERAPEUTIC EXERCISES        Primary Care Provider Office Phone # Fax #    Tatiana Juan [phone]       [facility] [address] [address]        Equal Access to Services     DAYNE TURNER : Hadii aad ku hadasho Soomaali, waaxda luqadaha, qaybta kaalmada adeegyada, araseli carrero. So [facility] [phone].    ATENCIÓN: Si habla español, tiene a rock disposición servicios gratuitos de asistencia lingüística. Llame al [phone].    We comply with applicable federal civil rights laws and [place] laws. We do not discriminate on the basis of race, color, national origin, age, disability, sex, sexual orientation, or gender identity.            Thank you!     Thank you for choosing [facility]  for your care. Our goal is always to provide you with excellent care. Hearing back from our patients is one way we can continue to improve our services. Please take a few " minutes to complete the written survey that you may receive in the mail after your visit with us. Thank you!             Your Updated Medication List - Protect others around you: Learn how to safely use, store and throw away your medicines at www.disposemymeds.org.          This list is accurate as of 5/7/18  2:27 PM.  Always use your most recent med list.                   Brand Name Dispense Instructions for use Diagnosis    ABILIFY 10 MG tablet   Generic drug:  ARIPiprazole      Take 10 mg by mouth daily        buPROPion 300 MG 24 hr tablet    WELLBUTRIN XL     every morning        levothyroxine 50 MCG tablet    SYNTHROID/LEVOTHROID    90 tablet    TAKE 1 TABLET BY MOUTH DAILY    Hypothyroidism, unspecified type       lithium 300 MG CR tablet    ESKALITH/LITHOBID          * OLANZapine 5 MG tablet    zyPREXA     Take 5 mg by mouth At Bedtime Pt taking 1/2 tab ( 2.5 mg) daily at bedtime        * OLANZapine 2.5 MG tablet    zyPREXA          omeprazole 20 MG CR capsule    priLOSEC    90 capsule    TAKE 1 CAPSULE BY MOUTH DAILY (30-60 MINUTE(S) BEFORE A MEAL)    Gastroesophageal reflux disease without esophagitis       terbinafine 1 % cream    lamISIL AT    80 g    Apply topically 2 times daily For fungal infection not resolved with other antifungals (e.g. Clotrimazole)    Tinea pedis of left foot       triamcinolone 0.1 % cream    KENALOG    15 g    Apply sparingly to affected area two times daily for 14 days.    Rash       * Notice:  This list has 2 medication(s) that are the same as other medications prescribed for you. Read the directions carefully, and ask your doctor or other care provider to review them with you.

## 2018-05-07 NOTE — PROGRESS NOTES
[facility] Initial Evaluation  Subjective:  Patient is a 27 year old male presenting with rehab left knee hpi. The history is provided by the patient.   Jerrod Solis is a 27 year old male with a left knee condition.  Condition occurred with:  Insidious onset.  Condition occurred: during recreation/sport.  This is a new condition  Pain for a few months after playing basketball.  MD referral 4/20/2018..    Patient reports pain:  Anterior and sub patellar.    Pain is described as aching and is intermittent and reported as 2/10.  Associated symptoms:  Loss of strength. Pain is worse during the day.  Symptoms are exacerbated by ascending stairs and descending stairs (after activity) and relieved by rest.  Since onset symptoms are unchanged.        General health as reported by patient is excellent.  Pertinent medical history includes:  Depression and thyroid problems.  Medical allergies: yes (ibuprofen).  Other surgeries include:  None reported.  Current medications:  Anti-depressants.  Current occupation is None.  Plays basketball a few times per week.  .        Barriers include:  None as reported by the patient.    Red flags:  None as reported by the patient.                        Objective:  Standing Alignment:              Knee:  Normal      Gait:    Gait Type:  Normal                                                           Knee Evaluation:  ROM:  AROM: normal  Prom wnl knee: slight crepitus on L.            Strength:     Extension:  Left: 4+/5   Pain:      Right: 5/5   Pain:  Flexion:  Left: 4+/5   Pain:      Right: 5/5   Pain:    Quad Set Left: Good    Pain:   Quad Set Right: Good    Pain:  Ligament Testing:  Normal                Special Tests:   Left knee positive for the following special tests:  Patellar Compression  Left knee negative for the following special tests:  Meninscal    Palpation:  Palpation of knee: denies tenderness.      Edema:  Normal            General      ROS    Assessment/Plan:    Patient is a 27 year old male with left side knee complaints.    Patient has the following significant findings with corresponding treatment plan.                Diagnosis 1:  L knee pain  Pain -  self management, education, directional preference exercise and home program  Decreased strength - therapeutic exercise and therapeutic activities  Decreased function - therapeutic activities      Therapy Evaluation Codes:   1) History comprised of:   Personal factors that impact the plan of care:      None.    Comorbidity factors that impact the plan of care are:      Depression.     Medications impacting care: Anti-depressant.  2) Examination of Body Systems comprised of:   Body structures and functions that impact the plan of care:      Knee.   Activity limitations that impact the plan of care are:      Running and Stairs.  3) Clinical presentation characteristics are:   Stable/Uncomplicated.  4) Decision-Making    Low complexity using standardized patient assessment instrument and/or measureable assessment of functional outcome.  Cumulative Therapy Evaluation is: Low complexity.    Previous and current functional limitations:  (See Goal Flow Sheet for this information)    Short term and Long term goals: (See Goal Flow Sheet for this information)     Communication ability:  Patient appears to be able to clearly communicate and understand verbal and written communication and follow directions correctly.  Treatment Explanation - The following has been discussed with the patient:   RX ordered/plan of care  Anticipated outcomes  Possible risks and side effects  This patient would benefit from PT intervention to resume normal activities.   Rehab potential is good.    Frequency:  1 X week, once daily  Duration:  for 6 weeks  Discharge Plan:  Achieve all LTG.  Independent in home treatment program.  Reach maximal therapeutic benefit.    Please refer to the daily flowsheet for treatment today,  total treatment time and time spent performing 1:1 timed codes.

## 2018-05-07 NOTE — LETTER
DEPARTMENT OF HEALTH AND HUMAN SERVICES  CENTERS FOR MEDICARE & MEDICAID SERVICES    PLAN/UPDATED PLAN OF PROGRESS FOR OUTPATIENT REHABILITATION    PATIENTS NAME:  Jerrod Solis   : 1990  PROVIDER NUMBER:    [id number]  [id number]  PROVIDER NAME: [facility] PT  MEDICAL RECORD NUMBER: [id number]   START OF CARE DATE:  SOC Date: 18   TYPE:  PT  PRIMARY/TREATMENT DIAGNOSIS: (Pertinent Medical Diagnosis)  Acute pain of left knee  VISITS FROM START OF CARE:1  Rxs Used: 1   [facility] Initial Evaluation  Subjective:  Patient is a 27 year old male presenting with rehab left knee hpi. The history is provided by the patient.   Jerrod Solis is a 27 year old male with a left knee condition.  Condition occurred with:  Insidious onset.  Condition occurred: during recreation/sport.  This is a new condition  Pain for a few months after playing basketball.  MD referral 2018..    Patient reports pain:  Anterior and sub patellar.    Pain is described as aching and is intermittent and reported as 2/10.  Associated symptoms:  Loss of strength. Pain is worse during the day.  Symptoms are exacerbated by ascending stairs and descending stairs (after activity) and relieved by rest.  Since onset symptoms are unchanged.        General health as reported by patient is excellent.  Pertinent medical history includes:  Depression and thyroid problems.  Medical allergies: yes (ibuprofen).  Other surgeries include:  None reported.  Current medications:  Anti-depressants.  Current occupation is None.  Plays basketball a few times per week.  .      Barriers include:  None as reported by the patient.  Red flags:  None as reported by the patient.  Objective:  Standing Alignment:    Knee:  Normal  Gait:    Gait Type:  Normal        Knee Evaluation:  ROM:  AROM: normal  Prom wnl knee: slight crepitus on L.    Strength:   Extension:  Left: 4+/5   Pain:      Right: 5/5    Pain:  Flexion:  Left: 4+/5   Pain:      Right: 5/5   Pain:    Quad Set Left: Good    Pain:   Quad Set Right: Good    Pain:  Ligament Testing:  Normal  Special Tests:   Left knee positive for the following special tests:  Patellar Compression  Left knee negative for the following special tests:  Meninscal  Palpation:  Palpation of knee: denies tenderness.  Edema:  Normal  General   ROS  Assessment/Plan:    Patient is a 27 year old male with left side knee complaints.    PATIENTS NAME:  Jerrod Solis   : 1990        Patient has the following significant findings with corresponding treatment plan.                Diagnosis 1:  L knee pain  Pain -  self management, education, directional preference exercise and home program  Decreased strength - therapeutic exercise and therapeutic activities  Decreased function - therapeutic activities      Therapy Evaluation Codes:   1) History comprised of:   Personal factors that impact the plan of care:      None.    Comorbidity factors that impact the plan of care are:      Depression.     Medications impacting care: Anti-depressant.  2) Examination of Body Systems comprised of:   Body structures and functions that impact the plan of care:      Knee.   Activity limitations that impact the plan of care are:      Running and Stairs.  3) Clinical presentation characteristics are:   Stable/Uncomplicated.  4) Decision-Making    Low complexity using standardized patient assessment instrument and/or measureable assessment of functional outcome.        Cumulative Therapy Evaluation is: Low complexity.  Previous and current functional limitations:  (See Goal Flow Sheet for this information)    Short term and Long term goals: (See Goal Flow Sheet for this information)   Communication ability:  Patient appears to be able to clearly communicate and understand verbal and written communication and follow directions correctly.  Treatment Explanation - The following has been discussed with  "the patient:   RX ordered/plan of care  Anticipated outcomes  Possible risks and side effects  This patient would benefit from PT intervention to resume normal activities.   Rehab potential is good.  Frequency:  1 X week, once daily  Duration:  for 6 weeks  Discharge Plan:  Achieve all LTG.  Independent in home treatment program.  Reach maximal therapeutic benefit.            PATIENTS NAME:  Jerrod Solis   : 1990                  Caregiver Signature/Credentials _____________________________ Date ________       Treating Provider: Troy Palma PT   I have reviewed and certified the need for these services and plan of treatment while under my care.        PHYSICIAN'S SIGNATURE:   _________________________________________  Date___________   Tatiana Juan MD    Certification period:  Beginning of Cert date period: 18 to 18       Functional Level Progress Report: Please see attached \"Goal Flow sheet for Functional level.\"    ____X____ Continue Services or       ________ DC Services                Service dates: From  SOC Date: 18 date to present                         "

## 2018-05-08 ENCOUNTER — PRE VISIT (OUTPATIENT)
Dept: NEUROLOGY | Facility: CLINIC | Age: 28
End: 2018-05-08

## 2018-05-08 ENCOUNTER — OFFICE VISIT (OUTPATIENT)
Dept: NEUROLOGY | Facility: CLINIC | Age: 28
End: 2018-05-08
Payer: MEDICAID

## 2018-05-08 VITALS
DIASTOLIC BLOOD PRESSURE: 84 MMHG | HEIGHT: 69 IN | BODY MASS INDEX: 28.42 KG/M2 | HEART RATE: 71 BPM | WEIGHT: 191.9 LBS | SYSTOLIC BLOOD PRESSURE: 132 MMHG

## 2018-05-08 DIAGNOSIS — G20.C PARKINSONISM, UNSPECIFIED PARKINSONISM TYPE (H): Primary | ICD-10-CM

## 2018-05-08 ASSESSMENT — PAIN SCALES - GENERAL: PAINLEVEL: NO PAIN (0)

## 2018-05-08 NOTE — MR AVS SNAPSHOT
After Visit Summary   2018    Jerrod Solis    MRN: [id number]           Patient Information     Date Of Birth          1990        Visit Information        Provider Department      2018 9:30 AM Jermaine Lawler MD [facility] Neurology        Today's Diagnoses     Parkinsonism, unspecified Parkinsonism type (H)    -  1      Care Instructions    #1  Return if tremor worsens          Follow-ups after your visit        Follow-up notes from your care team     Return if symptoms worsen or fail to improve.      Your next 10 appointments already scheduled     May 22, 2018 11:10 AM CDT   KAREN Extremity with Troy Palma PT   [facility] [place] [facility] ([facility])    [address]  [facility] [address] [phone]              Who to contact     Please call your clinic at [phone] to:    Ask questions about your health    Make or cancel appointments    Discuss your medicines    Learn about your test results    Speak to your doctor            Additional Information About Your Visit        MyChart Information     [employer] is an electronic gateway that provides easy, online access to your medical records. With [employer], you can request a clinic appointment, read your test results, renew a prescription or communicate with your care team.     To sign up for [employer] visit the website at [email or web address]   You will be asked to enter the access code listed below, as well as some personal information. Please follow the directions to create your username and password.     Your access code is: [email or web address]  Expires: 7/15/2018  6:30 AM     Your access code will  in 90 days. If you need help or a new code, please contact your [facility] or call [phone] for assistance.        Care EveryWhere ID     This is your Care EveryWhere ID. This could be used by other organizations to access your [facility]  "records  [id number]        Your Vitals Were     Pulse Height BMI (Body Mass Index)             71 1.74 m (5' 8.5\") 28.75 kg/m2          Blood Pressure from Last 3 Encounters:   05/08/18 132/84   04/20/18 136/87   02/28/18 108/68    Weight from Last 3 Encounters:   05/08/18 87 kg (191 lb 14.4 oz)   04/20/18 87.5 kg (193 lb)   02/28/18 85.9 kg (189 lb 4.8 oz)              Today, you had the following     No orders found for display       Primary Care Provider Office Phone # Fax #    Tatiana Juan [phone] [address] [address]        Equal Access to Services     DAYNE TURNER : Dionicio franciscoo Soclaudine, waaxda luqadaha, qaybta kaalmada adeegyada, araseli ch . So [facility] [phone].    ATENCIÓN: Si habla español, tiene a rock disposición servicios gratuitos de asistencia lingüística. Llame al [phone].    We comply with applicable federal civil rights laws and [place] laws. We do not discriminate on the basis of race, color, national origin, age, disability, sex, sexual orientation, or gender identity.            Thank you!     Thank you for choosing [facility] NEUROLOGY  for your care. Our goal is always to provide you with excellent care. Hearing back from our patients is one way we can continue to improve our services. Please take a few minutes to complete the written survey that you may receive in the mail after your visit with us. Thank you!             Your Updated Medication List - Protect others around you: Learn how to safely use, store and throw away your medicines at www.disposemymeds.org.          This list is accurate as of 5/8/18  3:06 PM.  Always use your most recent med list.                   Brand Name Dispense Instructions for use Diagnosis    ABILIFY 10 MG tablet   Generic drug:  ARIPiprazole      Take 10 mg by mouth daily        buPROPion 300 MG 24 hr tablet    WELLBUTRIN XL     every morning        " levothyroxine 50 MCG tablet    SYNTHROID/LEVOTHROID    90 tablet    TAKE 1 TABLET BY MOUTH DAILY    Hypothyroidism, unspecified type       lithium 300 MG CR tablet    ESKALITH/LITHOBID          * OLANZapine 5 MG tablet    zyPREXA     Take 5 mg by mouth At Bedtime Pt taking 1/2 tab ( 2.5 mg) daily at bedtime        * OLANZapine 2.5 MG tablet    zyPREXA          omeprazole 20 MG CR capsule    priLOSEC    90 capsule    TAKE 1 CAPSULE BY MOUTH DAILY (30-60 MINUTE(S) BEFORE A MEAL)    Gastroesophageal reflux disease without esophagitis       terbinafine 1 % cream    lamISIL AT    80 g    Apply topically 2 times daily For fungal infection not resolved with other antifungals (e.g. Clotrimazole)    Tinea pedis of left foot       triamcinolone 0.1 % cream    KENALOG    15 g    Apply sparingly to affected area two times daily for 14 days.    Rash       * Notice:  This list has 2 medication(s) that are the same as other medications prescribed for you. Read the directions carefully, and ask your doctor or other care provider to review them with you.

## 2018-05-08 NOTE — PROGRESS NOTES
Department of Neurology  Movement Disorders Division   Initial Clinic Evaluation     Patient: Jerrod Solis   MRN: [id number]   : 1990   Date of Visit: 2018     Referring Physician: Yessica    Reason for Referral: Tremor    Impression:  #1  No clinically significant tremor  #2  Mild drug-induced parkinsonism    On examination today there is no visible tremor either at rest or with action.  The patient does have mild rigidity of both arms and mild hypokinesia with finger tapping bilaterally.    Recommendations:     #1  No need for therapy at this time.  Observation only.  If tremor worsens patient can return for further evaluation.  #2  Drug-induced parkinsonism is mild and asymptomatic.  No need to alter neuroleptic therapy on this basis at this time.      Please call or write with questions or concerns,    Sincerely yours,    Jermaine Lawler MD      History of Present Illness  Mr. Solis is a 27 year old male who is right-handed.  He is currently unemployed.  He is living in an independent living center.  He comes with his caregiver Elidia.  He is referred for tremor.    The patient carries diagnoses of fetal alcohol syndrome.  He has attention deficit disorder and posttraumatic stress disorder.  He also has a mood disorder.  He is currently treated with multiple medications including Abilify 10 mg a day olanzapine 15 mg a day in divided doses Wellbutrin 300 mg a day and lithium 300 mg a day.  He has been on the lithium since .  The other 3 medicines were started in 2016.    He states that he has tremor of his right hand.  Mary Lou who is known over 2 months has never noticed tremor.  It is reported that the patient's father is concerned about the tremor.  This tremor is a longtime problem although the exact date of onset cannot be precisely given by the patient or his caregiver.  The patient says that the tremor comes on when he is holding his cell phone or holding his hand out.  He also notices  it while writing.  He can still eat with utensils and drink from a cup.  Putting a key in the lock is not a problem.  There is no family history of tremor.  The patient never drinks and does not know the effect of alcohol on tremor.  The patient denies resting tremor.  He denies other motor problems.  He says he may shuffle his gait a bit but does not have stooping.  He can stand from a chair and turn in bed.  His handwriting is shaky but not small.      Past Medical History:   Past Medical History:   Diagnosis Date     Alcohol affecting fetus or  via placenta or breast milk      Attention deficit disorder with hyperactivity(314.01)      Closed fracture of base of other metacarpal bone(s)     R 5th metacarpal     Mood disorder in conditions classified elsewhere      Oppositional defiant disorder of childhood or adolescence      Traumatic shock (H)     PTSD, reactive attachment disorder     Unspecified disturbance of conduct     explosive behavior disorder       Past Surgical History:   History reviewed. No pertinent surgical history.    Medications:  Current Outpatient Prescriptions   Medication Sig Dispense Refill     ABILIFY 10 MG tablet Take 10 mg by mouth daily        buPROPion (WELLBUTRIN XL) 300 MG 24 hr tablet every morning       levothyroxine (SYNTHROID/LEVOTHROID) 50 MCG tablet TAKE 1 TABLET BY MOUTH DAILY 90 tablet 3     lithium (ESKALITH/LITHOBID) 300 MG CR tablet        OLANZapine (ZYPREXA) 2.5 MG tablet        OLANZapine (ZYPREXA) 5 MG tablet Take 5 mg by mouth At Bedtime Pt taking 1/2 tab ( 2.5 mg) daily at bedtime       omeprazole (PRILOSEC) 20 MG CR capsule TAKE 1 CAPSULE BY MOUTH DAILY (30-60 MINUTE(S) BEFORE A MEAL) 90 capsule 3     terbinafine (LAMISIL AT) 1 % cream Apply topically 2 times daily For fungal infection not resolved with other antifungals (e.g. Clotrimazole) 80 g 1     triamcinolone (KENALOG) 0.1 % cream Apply sparingly to affected area two times daily for 14 days. 15 g 0           Movement Disorder-related Medications                                                                                                                                                             Allergies: is allergic to depakote [valproic acid] and ibuprofen.    Social History:   Social History     Social History     Marital status: Single     Spouse name: N/A     Number of children: N/A     Years of education: N/A     Social History Main Topics     Smoking status: Former Smoker     Smokeless tobacco: Never Used     Alcohol use No     Drug use: No     Sexual activity: Yes     Partners: Female     Birth control/ protection: Condom      Comment: Does now use Condom's regularly.     Other Topics Concern     Parent/Sibling W/ Cabg, Mi Or Angioplasty Before 65f 55m? No     Social History Narrative       Family History:  Family History   Problem Relation Age of Onset     DIABETES No family hx of      Coronary Artery Disease No family hx of      Hypertension No family hx of      Hyperlipidemia No family hx of      CEREBROVASCULAR DISEASE No family hx of      Breast Cancer No family hx of      Colon Cancer No family hx of      Prostate Cancer No family hx of      Other Cancer No family hx of      Depression No family hx of      Anxiety Disorder No family hx of      MENTAL ILLNESS No family hx of      Substance Abuse No family hx of      Anesthesia Reaction No family hx of      Asthma No family hx of      OSTEOPOROSIS No family hx of      Genetic Disorder No family hx of      Thyroid Disease No family hx of      Obesity No family hx of      Unknown/Adopted No family hx of        ROS:  General:  Fever : no, Chills: no, Sweats: no, Fatigue: no, ,Weight loss: no  Cardiovascular  Chest Pains: no, Palpitations: no, Edema: no, Shortness of breath: no  Respiratory  Cough: no  Gastrointestinal  Nausea: no, Vomiting: no, Diarrhea: no, Constipation: no  Genitourinary  Dysuria: no, Frequency: no, Urgency: no,  Nocturia: no,  Incontinence: no Musculoskeletal  Back pain: no, Neck Pain: no  Joint pain, swelling, redness: no, Stiffness: no  Skin  Rash: no, Itching: no,  Suspicious lesions: no  Psychiatric  Depression: YES, Anxiety/Panic: no  Endocrine  Cold intolerance: no, Heat intolerance: no, Excessive thirst: no  Hematologic/LymphatiAbnormal bruising, bleeding: no ,Enlarged lymph nodes: {.:[id number]  Allergic/Immunologic  Hives (Urticaria): no, HIV exposure: no    Neurologic  Visual loss: no, Double vision: no, Headache: no, Loss of consciousness:  no, Seizure: no, Fainting (syncope): no, Dysarthria: no, Dysphagia:  no, Vertigo:  no, Weakness: no, Atrophy: no, Twitches: no, Lhermitte's: no, Numbness or tingling: no, Handwriting change: YES, Tremors: YES, Involuntary movements: no, Imbalance: no, Abnormal gait:  no, Falls :no, Memory loss: no, RBD: no, Sleep disorder: no, Hallucination: no, Loss of concentration: no,  Behavioral change: no,  Loss of motivation: no      Comprehensive Neurologic Exam    Mental Status Exam   The patient is alert, oriented and exhibits no difficulty with cognition or memory.  A formal short test of mental status was performed.     Neurovascular         Speech and Language   Right Left     Carotid Bruit Absent Absent  Speech is normal.   Dorsalis Pedis Pulse Normal Normal  Description   Posterior Tibial Pulse Normal Normal  Language is normal.     Cranial Nerve Exam                 Right Left   Right Left   II                                        Normal Normal  Hearing (VIII) Normal Normal      III, IV, V!                   Normal Normal  Nystagmus (VIII) Normal Normal   EOM description                              Gag (IX, X) Normal Normal   Facial Sensation (V) Normal Normal  SCM (XI) Normal Normal   Muscles of Mastication (V) Normal Normal  Trapezius (XI) Normal Normal   Facial Strength (VII) Normal Normal  Tongue (XII) Normal Normal     Motor Exam  Strength (*/5 grading)  Muscle                  Right Left   Muscle Right Left   Frontalis                                           Normal Normal  Iliopsoas Normal    Normal          Orbicularis Oculi                     Normal Normal  Quadrideps Normal    Normal        Orbicularis Oris                         Normal Normal  Anterior Tibial Normal Normal      Deltoid Normal Normal  Gastrocnemius Normal    Normal   Biceps Normal Normal  Extensor Hallucis Longus Normal    Normal   Triceps Normal Normal  Toe Extensors Normal    Normal   EDC Normal Normal  Toe Flexor Normal    Normal   Finger Flexors Normal Normal  Other Normal Normal   First Dorsal Interosseous Normal Normal  Other Normal Normal   Hypothenar Normal Normal  Other Normal Normal   Thenar Normal Normal  Other Normal Normal     Reflexes      Tone   Right Left   Right Left   Biceps Normal Normal  Spasticity (S)  Rigidity (R)     Triceps Normal Normal  Neck     Brachioradialis Normal Normal  Arm +1 +1   Quadriceps Normal Normal  Leg 0 0   Ankle Normal Normal       Babkinski Flexor Flexor         AMR       Coordination   Right Left   Right Left   Fingers -1 -1  Finger nose finger Normal Normal   Hand Normal Normal  Drift Normal Normal   Leg Normal Normal  Heel Oro Normal Normal   Foot Normal Normal  Other     Other               Involuntary Movements    Gait and Station  None            Right Left  Stand & Sit Normal   Postural hand tremor Normal Normal  Gait Normal   Resting hand tremor Normal Normal  Tandem Normal   (Movement type) Normal Normal  Romberg Absent   No tremor seen in either arm with hands outstretched her hands close to face.  No tremor seen with handwriting.    Sensory Exam          Right Left    Pin Normal Normal    Vibration Normal Normal    Joint position Normal Normal    Other             Coding statement:     Duration of  Services: face-to-face40 min.   Greater than 50% of this visit was spent in counseling and coordination of care.    Medical decision making is high due to the following  components:    Number of diagnoses:[id number]  Management  Complexity of medical data:  Took collateral history.  Risk  One or more chronic illnesses with severe exacerbation, progression, or side effects of treatment.

## 2018-05-08 NOTE — LETTER
2018       RE: Jerrod Solis  [address]  [place] [address]     Dear Colleague,    Thank you for referring your patient, Jerrod Solis, to the [facility] NEUROLOGY at [facility]. Please see a copy of my visit note below.    Department of Neurology  Movement Disorders Division   Initial Clinic Evaluation     Patient: Jerrod Solis   MRN: [id number]   : 1990   Date of Visit: 2018     Referring Physician: Yessica    Reason for Referral: Tremor    Impression:  #1  No clinically significant tremor  #2  Mild drug-induced parkinsonism    On examination today there is no visible tremor either at rest or with action.  The patient does have mild rigidity of both arms and mild hypokinesia with finger tapping bilaterally.    Recommendations:     #1  No need for therapy at this time.  Observation only.  If tremor worsens patient can return for further evaluation.  #2  Drug-induced parkinsonism is mild and asymptomatic.  No need to alter neuroleptic therapy on this basis at this time.      Please call or write with questions or concerns,    Sincerely yours,    Jermaine Lawler MD      History of Present Illness  Mr. Solis is a 27 year old male who is right-handed.  He is currently unemployed.  He is living in an independent living center.  He comes with his caregiver Elidia.  He is referred for tremor.    The patient carries diagnoses of fetal alcohol syndrome.  He has attention deficit disorder and posttraumatic stress disorder.  He also has a mood disorder.  He is currently treated with multiple medications including Abilify 10 mg a day olanzapine 15 mg a day in divided doses Wellbutrin 300 mg a day and lithium 300 mg a day.  He has been on the lithium since .  The other 3 medicines were started in 2016.    He states that he has tremor of his right hand.  Mary Lou who is known over 2 months has never noticed tremor.  It is reported that the patient's father is  concerned about the tremor.  This tremor is a longtime problem although the exact date of onset cannot be precisely given by the patient or his caregiver.  The patient says that the tremor comes on when he is holding his cell phone or holding his hand out.  He also notices it while writing.  He can still eat with utensils and drink from a cup.  Putting a key in the lock is not a problem.  There is no family history of tremor.  The patient never drinks and does not know the effect of alcohol on tremor.  The patient denies resting tremor.  He denies other motor problems.  He says he may shuffle his gait a bit but does not have stooping.  He can stand from a chair and turn in bed.  His handwriting is shaky but not small.      Past Medical History:   Past Medical History:   Diagnosis Date     Alcohol affecting fetus or  via placenta or breast milk      Attention deficit disorder with hyperactivity(314.01)      Closed fracture of base of other metacarpal bone(s)     R 5th metacarpal     Mood disorder in conditions classified elsewhere      Oppositional defiant disorder of childhood or adolescence      Traumatic shock (H)     PTSD, reactive attachment disorder     Unspecified disturbance of conduct     explosive behavior disorder       Past Surgical History:   History reviewed. No pertinent surgical history.    Medications:  Current Outpatient Prescriptions   Medication Sig Dispense Refill     ABILIFY 10 MG tablet Take 10 mg by mouth daily        buPROPion (WELLBUTRIN XL) 300 MG 24 hr tablet every morning       levothyroxine (SYNTHROID/LEVOTHROID) 50 MCG tablet TAKE 1 TABLET BY MOUTH DAILY 90 tablet 3     lithium (ESKALITH/LITHOBID) 300 MG CR tablet        OLANZapine (ZYPREXA) 2.5 MG tablet        OLANZapine (ZYPREXA) 5 MG tablet Take 5 mg by mouth At Bedtime Pt taking 1/2 tab ( 2.5 mg) daily at bedtime       omeprazole (PRILOSEC) 20 MG CR capsule TAKE 1 CAPSULE BY MOUTH DAILY (30-60 MINUTE(S) BEFORE A MEAL) 90  capsule 3     terbinafine (LAMISIL AT) 1 % cream Apply topically 2 times daily For fungal infection not resolved with other antifungals (e.g. Clotrimazole) 80 g 1     triamcinolone (KENALOG) 0.1 % cream Apply sparingly to affected area two times daily for 14 days. 15 g 0          Movement Disorder-related Medications                                                                                                                                                             Allergies: is allergic to depakote [valproic acid] and ibuprofen.    Social History:   Social History     Social History     Marital status: Single     Spouse name: N/A     Number of children: N/A     Years of education: N/A     Social History Main Topics     Smoking status: Former Smoker     Smokeless tobacco: Never Used     Alcohol use No     Drug use: No     Sexual activity: Yes     Partners: Female     Birth control/ protection: Condom      Comment: Does now use Condom's regularly.     Other Topics Concern     Parent/Sibling W/ Cabg, Mi Or Angioplasty Before 65f 55m? No     Social History Narrative       Family History:  Family History   Problem Relation Age of Onset     DIABETES No family hx of      Coronary Artery Disease No family hx of      Hypertension No family hx of      Hyperlipidemia No family hx of      CEREBROVASCULAR DISEASE No family hx of      Breast Cancer No family hx of      Colon Cancer No family hx of      Prostate Cancer No family hx of      Other Cancer No family hx of      Depression No family hx of      Anxiety Disorder No family hx of      MENTAL ILLNESS No family hx of      Substance Abuse No family hx of      Anesthesia Reaction No family hx of      Asthma No family hx of      OSTEOPOROSIS No family hx of      Genetic Disorder No family hx of      Thyroid Disease No family hx of      Obesity No family hx of      Unknown/Adopted No family hx of        ROS:  General:  Fever : no, Chills: no, Sweats: no, Fatigue: no,  ,Weight loss: no  Cardiovascular  Chest Pains: no, Palpitations: no, Edema: no, Shortness of breath: no  Respiratory  Cough: no  Gastrointestinal  Nausea: no, Vomiting: no, Diarrhea: no, Constipation: no  Genitourinary  Dysuria: no, Frequency: no, Urgency: no,  Nocturia: no, Incontinence: no Musculoskeletal  Back pain: no, Neck Pain: no  Joint pain, swelling, redness: no, Stiffness: no  Skin  Rash: no, Itching: no,  Suspicious lesions: no  Psychiatric  Depression: YES, Anxiety/Panic: no  Endocrine  Cold intolerance: no, Heat intolerance: no, Excessive thirst: no  Hematologic/LymphatiAbnormal bruising, bleeding: no ,Enlarged lymph nodes: {.:[id number]  Allergic/Immunologic  Hives (Urticaria): no, HIV exposure: no    Neurologic  Visual loss: no, Double vision: no, Headache: no, Loss of consciousness:  no, Seizure: no, Fainting (syncope): no, Dysarthria: no, Dysphagia:  no, Vertigo:  no, Weakness: no, Atrophy: no, Twitches: no, Lhermitte's: no, Numbness or tingling: no, Handwriting change: YES, Tremors: YES, Involuntary movements: no, Imbalance: no, Abnormal gait:  no, Falls :no, Memory loss: no, RBD: no, Sleep disorder: no, Hallucination: no, Loss of concentration: no,  Behavioral change: no,  Loss of motivation: no      Comprehensive Neurologic Exam    Mental Status Exam   The patient is alert, oriented and exhibits no difficulty with cognition or memory.  A formal short test of mental status was performed.     Neurovascular         Speech and Language   Right Left     Carotid Bruit Absent Absent  Speech is normal.   Dorsalis Pedis Pulse Normal Normal  Description   Posterior Tibial Pulse Normal Normal  Language is normal.     Cranial Nerve Exam                 Right Left   Right Left   II                                        Normal Normal  Hearing (VIII) Normal Normal      III, IV, V!                   Normal Normal  Nystagmus (VIII) Normal Normal   EOM description                              Gag (IX, X) Normal  Normal   Facial Sensation (V) Normal Normal  SCM (XI) Normal Normal   Muscles of Mastication (V) Normal Normal  Trapezius (XI) Normal Normal   Facial Strength (VII) Normal Normal  Tongue (XII) Normal Normal     Motor Exam  Strength (*/5 grading)  Muscle                  Right Left  Muscle Right Left   Frontalis                                           Normal Normal  Iliopsoas Normal    Normal          Orbicularis Oculi                     Normal Normal  Quadrideps Normal    Normal        Orbicularis Oris                         Normal Normal  Anterior Tibial Normal Normal      Deltoid Normal Normal  Gastrocnemius Normal    Normal   Biceps Normal Normal  Extensor Hallucis Longus Normal    Normal   Triceps Normal Normal  Toe Extensors Normal    Normal   EDC Normal Normal  Toe Flexor Normal    Normal   Finger Flexors Normal Normal  Other Normal Normal   First Dorsal Interosseous Normal Normal  Other Normal Normal   Hypothenar Normal Normal  Other Normal Normal   Thenar Normal Normal  Other Normal Normal     Reflexes      Tone   Right Left   Right Left   Biceps Normal Normal  Spasticity (S)  Rigidity (R)     Triceps Normal Normal  Neck     Brachioradialis Normal Normal  Arm +1 +1   Quadriceps Normal Normal  Leg 0 0   Ankle Normal Normal       Babkinski Flexor Flexor         AMR       Coordination   Right Left   Right Left   Fingers -1 -1  Finger nose finger Normal Normal   Hand Normal Normal  Drift Normal Normal   Leg Normal Normal  Heel Oro Normal Normal   Foot Normal Normal  Other     Other               Involuntary Movements    Gait and Station  None            Right Left  Stand & Sit Normal   Postural hand tremor Normal Normal  Gait Normal   Resting hand tremor Normal Normal  Tandem Normal   (Movement type) Normal Normal  Romberg Absent   No tremor seen in either arm with hands outstretched her hands close to face.  No tremor seen with handwriting.    Sensory Exam          Right Left    Pin Normal Normal     Vibration Normal Normal    Joint position Normal Normal    Other             Coding statement:     Duration of  Services: face-to-face40 min.   Greater than 50% of this visit was spent in counseling and coordination of care.    Medical decision making is high due to the following components:    Number of diagnoses:[id number]  Management  Complexity of medical data:  Took collateral history.  Risk  One or more chronic illnesses with severe exacerbation, progression, or side effects of treatment.        Again, thank you for allowing me to participate in the care of your patient.      Sincerely,    Jermaine Lawler MD

## 2018-05-11 ENCOUNTER — TELEPHONE (OUTPATIENT)
Dept: FAMILY MEDICINE | Facility: CLINIC | Age: 28
End: 2018-05-11

## 2018-05-11 NOTE — TELEPHONE ENCOUNTER
Received form(s) from pt for plan of progress.  Placed form(s) ls box.  Forms need to be signed and fexed back to [phone].    Call pt to verify form was sent: no  Copy needs to be sent for scanning after completion: yes

## 2018-05-22 ENCOUNTER — THERAPY VISIT (OUTPATIENT)
Dept: PHYSICAL THERAPY | Facility: CLINIC | Age: 28
End: 2018-05-22
Payer: MEDICARE

## 2018-05-22 DIAGNOSIS — M25.562 ACUTE PAIN OF LEFT KNEE: ICD-10-CM

## 2018-05-22 PROCEDURE — 97110 THERAPEUTIC EXERCISES: CPT | Mod: GP | Performed by: PHYSICAL THERAPIST

## 2018-05-22 PROCEDURE — 97530 THERAPEUTIC ACTIVITIES: CPT | Mod: GP | Performed by: PHYSICAL THERAPIST

## 2018-05-22 PROCEDURE — 97112 NEUROMUSCULAR REEDUCATION: CPT | Mod: GP | Performed by: PHYSICAL THERAPIST

## 2018-05-22 NOTE — MR AVS SNAPSHOT
"              After Visit Summary   5/22/2018    Jerrod Solis    MRN: [id number]           Patient Information     Date Of Birth          1990        Visit Information        Provider Department      5/22/2018 11:10 AM Troy Palma, PT [facility]        Today's Diagnoses     Acute pain of left knee           Follow-ups after your visit        Your next 10 appointments already scheduled     Jun 05, 2018 11:50 AM CDT   KAREN Extremity with Troy Palma PT   [facility] (Formerly [facility])    [address]  [facility] [address] [phone]              Who to contact     If you have questions or need follow up information about today's clinic visit or your schedule please contact [facility] directly at [phone].  Normal or non-critical lab and imaging results will be communicated to you by [employer], letter or phone within 4 business days after the clinic has received the results. If you do not hear from us within 7 days, please contact the clinic through MyChart or phone. If you have a critical or abnormal lab result, we will notify you by phone as soon as possible.  Submit refill requests through [employer] or call your pharmacy and they will forward the refill request to us. Please allow 3 business days for your refill to be completed.          Additional Information About Your Visit        [employer] Information     SSEV lets you send messages to your doctor, view your test results, renew your prescriptions, schedule appointments and more. To sign up, go to [email or web address] . Click on \"Log in\" on the left side of the screen, which will take you to the Welcome page. Then click on \"Sign up Now\" on the right side of the page.     You will be asked to enter the access code listed below, as well as some personal information. Please follow the directions to " create your username and password.     Your access code is: [email or web address]  Expires: 7/15/2018  6:30 AM     Your access code will  in 90 days. If you need help or a new code, please call your [facility] clinic or [phone].        Care EveryWhere ID     This is your Care EveryWhere ID. This could be used by other organizations to access your [facility] medical records  [id number]         Blood Pressure from Last 3 Encounters:   18 132/84   18 136/87   18 108/68    Weight from Last 3 Encounters:   18 87 kg (191 lb 14.4 oz)   18 87.5 kg (193 lb)   18 85.9 kg (189 lb 4.8 oz)              We Performed the Following     NEUROMUSCULAR RE-EDUCATION     THERAPEUTIC ACTIVITIES     THERAPEUTIC EXERCISES        Primary Care Provider Office Phone # Fax #    Tatiana Juan [phone]       [address] [address]        Equal Access to Services     [facility]: Hadii aad ku hadasho Soomaali, waaxda luqadaha, qaybta kaalmada adeegyada, waxay gurjit haysally ch . So [facility] [phone].    ATENCIÓN: Si habla español, tiene a rock disposición servicios gratuitos de asistencia lingüística. Aislinn al [phone].    We comply with applicable federal civil rights laws and [place] laws. We do not discriminate on the basis of race, color, national origin, age, disability, sex, sexual orientation, or gender identity.            Thank you!     Thank you for choosing [facility]  for your care. Our goal is always to provide you with excellent care. Hearing back from our patients is one way we can continue to improve our services. Please take a few minutes to complete the written survey that you may receive in the mail after your visit with us. Thank you!             Your Updated Medication List - Protect others around you: Learn how to safely use, store and throw away your medicines at  www.disposemymeds.org.          This list is accurate as of 5/22/18 12:41 PM.  Always use your most recent med list.                   Brand Name Dispense Instructions for use Diagnosis    ABILIFY 10 MG tablet   Generic drug:  ARIPiprazole      Take 10 mg by mouth daily        buPROPion 300 MG 24 hr tablet    WELLBUTRIN XL     every morning        levothyroxine 50 MCG tablet    SYNTHROID/LEVOTHROID    90 tablet    TAKE 1 TABLET BY MOUTH DAILY    Hypothyroidism, unspecified type       lithium 300 MG CR tablet    ESKALITH/LITHOBID          * OLANZapine 5 MG tablet    zyPREXA     Take 5 mg by mouth At Bedtime Pt taking 1/2 tab ( 2.5 mg) daily at bedtime        * OLANZapine 2.5 MG tablet    zyPREXA          omeprazole 20 MG CR capsule    priLOSEC    90 capsule    TAKE 1 CAPSULE BY MOUTH DAILY (30-60 MINUTE(S) BEFORE A MEAL)    Gastroesophageal reflux disease without esophagitis       terbinafine 1 % cream    lamISIL AT    80 g    Apply topically 2 times daily For fungal infection not resolved with other antifungals (e.g. Clotrimazole)    Tinea pedis of left foot       triamcinolone 0.1 % cream    KENALOG    15 g    Apply sparingly to affected area two times daily for 14 days.    Rash       * Notice:  This list has 2 medication(s) that are the same as other medications prescribed for you. Read the directions carefully, and ask your doctor or other care provider to review them with you.

## 2018-06-05 NOTE — TELEPHONE ENCOUNTER
KAREN Escobar [place] is inquiring about the Medicare certification she faxed to clinic on 5/10/2018.  She will fax another form to us.  Please complete and faxed back to [phone] fatoumata Escobar ph# [phone].

## 2018-07-11 ENCOUNTER — MEDICAL CORRESPONDENCE (OUTPATIENT)
Dept: HEALTH INFORMATION MANAGEMENT | Facility: CLINIC | Age: 28
End: 2018-07-11

## 2018-07-12 DIAGNOSIS — E03.9 HYPOTHYROIDISM, UNSPECIFIED TYPE: ICD-10-CM

## 2018-07-13 RX ORDER — LEVOTHYROXINE SODIUM 50 UG/1
TABLET ORAL
Start: 2018-07-13

## 2018-07-13 NOTE — TELEPHONE ENCOUNTER
"Too soon.  Rx sent 12/6/17 for #90 with 3 refills.  Christin Bradley RN  Requested Prescriptions   Refused Prescriptions Disp Refills     levothyroxine (SYNTHROID/LEVOTHROID) 50 MCG tablet [Pharmacy Med Name: LEVOTHYROXINE 50MCG TAB]       Sig: TAKE 1 TABLET BY MOUTH EVERY MORNING    Thyroid Protocol Passed    7/13/2018  8:33 AM       Passed - Patient is 12 years or older       Passed - Recent (12 mo) or future (30 days) visit within the authorizing provider's specialty    Patient had office visit in the last 12 months or has a visit in the next 30 days with authorizing provider or within the authorizing provider's specialty.  See \"Patient Info\" tab in inbasket, or \"Choose Columns\" in Meds & Orders section of the refill encounter.           Passed - Normal TSH on file in past 12 months    Recent Labs   Lab Test  11/14/17   1139   TSH  0.57                "

## 2018-07-18 ENCOUNTER — TELEPHONE (OUTPATIENT)
Dept: FAMILY MEDICINE | Facility: CLINIC | Age: 28
End: 2018-07-18

## 2018-07-18 DIAGNOSIS — Z00.8 ENCOUNTER FOR OTHER GENERAL EXAMINATION (CODE): Primary | ICD-10-CM

## 2018-07-18 NOTE — TELEPHONE ENCOUNTER
Reason for Call:  Medication or medication refill:    Do you use a [facility]?  Name of the pharmacy and phone number for the current request:        [place], [place] - [address]       Name of the medication requested:   Prevident 5000 (toothpaste)    Other request:    Can we leave a detailed message on this number? YES    Phone number nurse can be reached at:  [phone]    Best Time: Any     Call taken on 7/18/2018 at 8:45 AM by Delmis Malcolm

## 2018-07-25 DIAGNOSIS — Z13.220 LIPID SCREENING: ICD-10-CM

## 2018-07-25 DIAGNOSIS — F41.1 GENERALIZED ANXIETY DISORDER: Primary | ICD-10-CM

## 2018-07-25 DIAGNOSIS — Z79.899 LONG-TERM USE OF HIGH-RISK MEDICATION: ICD-10-CM

## 2018-07-25 LAB
ALBUMIN UR-MCNC: ABNORMAL MG/DL
ALBUMIN UR-MCNC: NEGATIVE MG/DL
APPEARANCE UR: ABNORMAL
APPEARANCE UR: CLEAR
BASOPHILS # BLD AUTO: 0.2 10E9/L (ref 0–0.2)
BASOPHILS NFR BLD AUTO: 3 %
BILIRUB UR QL STRIP: ABNORMAL
BILIRUB UR QL STRIP: NEGATIVE
COLOR UR AUTO: ABNORMAL
COLOR UR AUTO: YELLOW
DIFFERENTIAL METHOD BLD: ABNORMAL
EOSINOPHIL # BLD AUTO: 0.5 10E9/L (ref 0–0.7)
EOSINOPHIL NFR BLD AUTO: 6.5 %
ERYTHROCYTE [DISTWIDTH] IN BLOOD BY AUTOMATED COUNT: 16.6 % (ref 10–15)
GLUCOSE UR STRIP-MCNC: ABNORMAL MG/DL
GLUCOSE UR STRIP-MCNC: NEGATIVE MG/DL
HCT VFR BLD AUTO: 42.2 % (ref 40–53)
HGB BLD-MCNC: 13.9 G/DL (ref 13.3–17.7)
HGB UR QL STRIP: ABNORMAL
HGB UR QL STRIP: NEGATIVE
KETONES UR STRIP-MCNC: ABNORMAL MG/DL
KETONES UR STRIP-MCNC: NEGATIVE MG/DL
LEUKOCYTE ESTERASE UR QL STRIP: ABNORMAL
LEUKOCYTE ESTERASE UR QL STRIP: NEGATIVE
LITHIUM SERPL-SCNC: 0.78 MMOL/L (ref 0.6–1.2)
LYMPHOCYTES # BLD AUTO: 2.9 10E9/L (ref 0.8–5.3)
LYMPHOCYTES NFR BLD AUTO: 41 %
MCH RBC QN AUTO: 27.9 PG (ref 26.5–33)
MCHC RBC AUTO-ENTMCNC: 32.9 G/DL (ref 31.5–36.5)
MCV RBC AUTO: 85 FL (ref 78–100)
MONOCYTES # BLD AUTO: 0.6 10E9/L (ref 0–1.3)
MONOCYTES NFR BLD AUTO: 7.9 %
NEUTROPHILS # BLD AUTO: 3 10E9/L (ref 1.6–8.3)
NEUTROPHILS NFR BLD AUTO: 41.6 %
NITRATE UR QL: ABNORMAL
NITRATE UR QL: NEGATIVE
PH UR STRIP: 7 PH (ref 5–7)
PH UR STRIP: ABNORMAL PH (ref 5–7)
PLATELET # BLD AUTO: 167 10E9/L (ref 150–450)
RBC # BLD AUTO: 4.99 10E12/L (ref 4.4–5.9)
RBC #/AREA URNS AUTO: NORMAL /HPF
SOURCE: ABNORMAL
SOURCE: NORMAL
SP GR UR STRIP: 1.01 (ref 1–1.03)
SP GR UR STRIP: ABNORMAL (ref 1–1.03)
UROBILINOGEN UR STRIP-ACNC: 0.2 EU/DL (ref 0.2–1)
UROBILINOGEN UR STRIP-ACNC: ABNORMAL EU/DL (ref 0.2–1)
WBC # BLD AUTO: 7.1 10E9/L (ref 4–11)
WBC #/AREA URNS AUTO: NORMAL /HPF

## 2018-07-25 PROCEDURE — 80051 ELECTROLYTE PANEL: CPT

## 2018-07-25 PROCEDURE — 36415 COLL VENOUS BLD VENIPUNCTURE: CPT

## 2018-07-25 PROCEDURE — 82947 ASSAY GLUCOSE BLOOD QUANT: CPT

## 2018-07-25 PROCEDURE — 80061 LIPID PANEL: CPT

## 2018-07-25 PROCEDURE — 81001 URINALYSIS AUTO W/SCOPE: CPT

## 2018-07-25 PROCEDURE — 84443 ASSAY THYROID STIM HORMONE: CPT

## 2018-07-25 PROCEDURE — 82310 ASSAY OF CALCIUM: CPT

## 2018-07-25 PROCEDURE — 80178 ASSAY OF LITHIUM: CPT

## 2018-07-25 PROCEDURE — 85025 COMPLETE CBC W/AUTO DIFF WBC: CPT

## 2018-07-26 LAB
ANION GAP SERPL CALCULATED.3IONS-SCNC: 10 MMOL/L (ref 3–14)
CALCIUM SERPL-MCNC: 8.8 MG/DL (ref 8.5–10.1)
CHLORIDE SERPL-SCNC: 106 MMOL/L (ref 94–109)
CHOLEST SERPL-MCNC: 239 MG/DL
CO2 SERPL-SCNC: 24 MMOL/L (ref 20–32)
GLUCOSE SERPL-MCNC: 92 MG/DL (ref 70–99)
HDLC SERPL-MCNC: 47 MG/DL
LDLC SERPL CALC-MCNC: ABNORMAL MG/DL
NONHDLC SERPL-MCNC: 192 MG/DL
POTASSIUM SERPL-SCNC: 4 MMOL/L (ref 3.4–5.3)
SODIUM SERPL-SCNC: 140 MMOL/L (ref 133–144)
TRIGL SERPL-MCNC: 641 MG/DL
TSH SERPL DL<=0.005 MIU/L-ACNC: 1.78 MU/L (ref 0.4–4)

## 2018-08-15 ENCOUNTER — TELEPHONE (OUTPATIENT)
Dept: FAMILY MEDICINE | Facility: CLINIC | Age: 28
End: 2018-08-15

## 2018-08-15 DIAGNOSIS — E78.2 ELEVATED CHOLESTEROL WITH ELEVATED TRIGLYCERIDES: Primary | ICD-10-CM

## 2018-08-15 RX ORDER — OMEGA-3 FATTY ACIDS/FISH OIL 300-1000MG
1 CAPSULE ORAL 2 TIMES DAILY WITH MEALS
Qty: 60 CAPSULE | Refills: 3 | Status: SHIPPED | OUTPATIENT
Start: 2018-08-15 | End: 2018-10-09

## 2018-08-15 NOTE — TELEPHONE ENCOUNTER
Laura SANTAMARIA from patient's group home calling  Wanting to know regarding 7/25/2018 labs ordered by psych if anything needs to be done about lipids  Psych advised she f/u with pt's PCP on this  Currently not on any cholesterol meds, fish oil, etc.  Please advise  Thanks,  Colleen LOPES RN

## 2018-08-15 NOTE — TELEPHONE ENCOUNTER
Yes, he would need to start fish oil 1000mg twice a day with food   Can you let her know and pt know?  I will send a Rx if they want me to , otherwise it is OTC   Thanks

## 2018-08-15 NOTE — TELEPHONE ENCOUNTER
Reason for Call:  Other call back    Detailed comments: Laura with [facility] is calling to follow up on some lab results for Jerrod    Phone Number Patient can be reached at: Other phone number:  [phone]    Best Time: anytime     Can we leave a detailed message on this number? YES    Call taken on 8/15/2018 at 3:13 PM by Ronna Green

## 2018-08-31 ENCOUNTER — OFFICE VISIT (OUTPATIENT)
Dept: FAMILY MEDICINE | Facility: CLINIC | Age: 28
End: 2018-08-31
Payer: MEDICARE

## 2018-08-31 VITALS
BODY MASS INDEX: 28.92 KG/M2 | TEMPERATURE: 97.6 F | SYSTOLIC BLOOD PRESSURE: 130 MMHG | HEART RATE: 71 BPM | DIASTOLIC BLOOD PRESSURE: 80 MMHG | OXYGEN SATURATION: 100 % | RESPIRATION RATE: 20 BRPM | WEIGHT: 193 LBS

## 2018-08-31 DIAGNOSIS — Z11.3 SCREEN FOR STD (SEXUALLY TRANSMITTED DISEASE): Primary | ICD-10-CM

## 2018-08-31 DIAGNOSIS — R10.31 INGUINAL PAIN, RIGHT: ICD-10-CM

## 2018-08-31 PROCEDURE — 36415 COLL VENOUS BLD VENIPUNCTURE: CPT | Performed by: FAMILY MEDICINE

## 2018-08-31 PROCEDURE — 86780 TREPONEMA PALLIDUM: CPT | Performed by: FAMILY MEDICINE

## 2018-08-31 PROCEDURE — 87591 N.GONORRHOEAE DNA AMP PROB: CPT | Performed by: FAMILY MEDICINE

## 2018-08-31 PROCEDURE — 87491 CHLMYD TRACH DNA AMP PROBE: CPT | Performed by: FAMILY MEDICINE

## 2018-08-31 PROCEDURE — 87389 HIV-1 AG W/HIV-1&-2 AB AG IA: CPT | Performed by: FAMILY MEDICINE

## 2018-08-31 PROCEDURE — 99214 OFFICE O/P EST MOD 30 MIN: CPT | Performed by: FAMILY MEDICINE

## 2018-08-31 PROCEDURE — 86803 HEPATITIS C AB TEST: CPT | Performed by: FAMILY MEDICINE

## 2018-08-31 NOTE — MR AVS SNAPSHOT
After Visit Summary   8/31/2018    Jerrod Solis    MRN: [id number]           Patient Information     Date Of Birth          1990        Visit Information        Provider Department      8/31/2018 11:00 AM Tatiana Juan MD [facility]        Today's Diagnoses     Screen for STD (sexually transmitted disease)    -  1    Inguinal pain, right           Follow-ups after your visit        Your next 10 appointments already scheduled     Nov 20, 2018  2:00 PM CST   PHYSICAL with Tatiana Juan MD   [facility] ([facility])    [address] [facility] [address] [phone]              Who to contact     If you have questions or need follow up information about today's clinic visit or your schedule please contact [facility] directly at [phone].  Normal or non-critical lab and imaging results will be communicated to you by MyChart, letter or phone within 4 business days after the clinic has received the results. If you do not hear from us within 7 days, please contact the clinic through MyChart or phone. If you have a critical or abnormal lab result, we will notify you by phone as soon as possible.  Submit refill requests through [employer] or call your pharmacy and they will forward the refill request to us. Please allow 3 business days for your refill to be completed.          Additional Information About Your Visit        Care EveryWhere ID     This is your Care EveryWhere ID. This could be used by other organizations to access your [place] medical records  [id number]        Your Vitals Were     Pulse Temperature Respirations Pulse Oximetry BMI (Body Mass Index)       71 97.6  F (36.4  C) (Oral) 20 100% 28.92 kg/m2        Blood Pressure from Last 3 Encounters:   08/31/18 130/80   05/08/18 132/84   04/20/18 136/87    Weight from Last 3 Encounters:   08/31/18 193 lb (87.5 kg)   05/08/18 191 lb 14.4 oz (87 kg)   04/20/18 193 lb (87.5  kg)              We Performed the Following     CHLAMYDIA TRACHOMATIS PCR     Hepatitis C antibody     HIV Antigen Antibody Combo     NEISSERIA GONORRHOEA PCR     Treponema Abs w Reflex to RPR and Titer        Primary Care Provider Office Phone # Fax #    Tatiana Juan [phone] [address] [facility]   [facility] [address]        Equal Access to Services     [facility]: Hadii aad ku hadasho Soomaali, waaxda luqadaha, qaybta kaalmada adeegyada, waxay idiin hayaan adeeg kharash la'aan . So [facility] [phone].    ATENCIÓN: Si habla español, tiene a rock disposición servicios gratuitos de asistencia lingüística. Alessiaame al [phone].    We comply with applicable federal civil rights laws and [place] laws. We do not discriminate on the basis of race, color, national origin, age, disability, sex, sexual orientation, or gender identity.            Thank you!     Thank you for choosing [facility]  for your care. Our goal is always to provide you with excellent care. Hearing back from our patients is one way we can continue to improve our services. Please take a few minutes to complete the written survey that you may receive in the mail after your visit with us. Thank you!             Your Updated Medication List - Protect others around you: Learn how to safely use, store and throw away your medicines at www.disposemymeds.org.          This list is accurate as of 8/31/18  2:08 PM.  Always use your most recent med list.                   Brand Name Dispense Instructions for use Diagnosis    ABILIFY 10 MG tablet   Generic drug:  ARIPiprazole      Take 10 mg by mouth daily        buPROPion 300 MG 24 hr tablet    WELLBUTRIN XL     every morning        levothyroxine 50 MCG tablet    SYNTHROID/LEVOTHROID    90 tablet    TAKE 1 TABLET BY MOUTH DAILY    Hypothyroidism, unspecified type       lithium 300 MG CR tablet    ESKALITH/LITHOBID          * OLANZapine 5 MG tablet    zyPREXA     Take  5 mg by mouth At Bedtime Pt taking 1/2 tab ( 2.5 mg) daily at bedtime        * OLANZapine 2.5 MG tablet    zyPREXA          omega 3 1000 MG Caps     60 capsule    Take 1 g by mouth 2 times daily (with meals)    Elevated cholesterol with elevated triglycerides       omeprazole 20 MG CR capsule    priLOSEC    90 capsule    TAKE 1 CAPSULE BY MOUTH DAILY (30-60 MINUTE(S) BEFORE A MEAL)    Gastroesophageal reflux disease without esophagitis       Sod Fluoride-Potassium Nitrate 1.1-5 % Pste    PREVIDENT 5000 ENAMEL PROTECT    1 Tube    Apply 1 Device to affected area 2 times daily    Encounter for other general examination (CODE)       terbinafine 1 % cream    lamISIL AT    80 g    Apply topically 2 times daily For fungal infection not resolved with other antifungals (e.g. Clotrimazole)    Tinea pedis of left foot       triamcinolone 0.1 % cream    KENALOG    15 g    Apply sparingly to affected area two times daily for 14 days.    Rash       * Notice:  This list has 2 medication(s) that are the same as other medications prescribed for you. Read the directions carefully, and ask your doctor or other care provider to review them with you.

## 2018-08-31 NOTE — LETTER
September 4, 2018      Jerrod Solis  [address]  [place] [address]        Dear ,    We are writing to inform you of your test results.  Your test results fall within the expected range(s) or remain unchanged from previous results.  Please continue with current treatment plan.  Resulted Orders   Treponema Abs w Reflex to RPR and Titer   Result Value Ref Range    Treponema Antibodies Nonreactive NR^Nonreactive   NEISSERIA GONORRHOEA PCR   Result Value Ref Range    Specimen Descrip Urine     N Gonorrhea PCR Negative NEG^Negative      Comment:      Negative for N. gonorrhoeae rRNA by transcription mediated amplification.  A negative result by transcription mediated amplification does not preclude   the presence of N. gonorrhoeae infection because results are dependent on   proper and adequate collection, absence of inhibitors, and sufficient rRNA to   be detected.     CHLAMYDIA TRACHOMATIS PCR   Result Value Ref Range    Specimen Description Urine     Chlamydia Trachomatis PCR Negative NEG^Negative      Comment:      Negative for C. trachomatis rRNA by transcription mediated amplification.  A negative result by transcription mediated amplification does not preclude   the presence of C. trachomatis infection because results are dependent on   proper and adequate collection, absence of inhibitors, and sufficient rRNA to   be detected.       If you have any questions or concerns, please call the clinic at the number listed above.     Sincerely,      Tatiana Juan MD/ms

## 2018-08-31 NOTE — PROGRESS NOTES
SUBJECTIVE:   Jerrod Solis is a 27 year old male who presents to clinic today for the following health issues:      Musculoskeletal problem/pain- on and off in the right groin area , states that hurts when he tried to extend the right leg side ways when sitting but other than that no pain with physical activity , no pain with voiding , no dysuria , no hematuria , no other concerns       Duration: started a few months ago     Description  Location: right leg     Intensity:  7/10    Accompanying signs and symptoms: none    History  Previous similar problem: YES  Previous evaluation:  none    Precipitating or alleviating factors:  Trauma or overuse: no   Aggravating factors include: none    Therapies tried and outcome: nothing    STD check.  No symptoms     Problem list and histories reviewed & adjusted, as indicated.  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    Social History   Substance Use Topics     Smoking status: Former Smoker     Smokeless tobacco: Never Used     Alcohol use No     Family History   Problem Relation Age of Onset     Diabetes No family hx of      Coronary Artery Disease No family hx of      Hypertension No family hx of      Hyperlipidemia No family hx of      Cerebrovascular Disease No family hx of      Breast Cancer No family hx of      Colon Cancer No  family hx of      Prostate Cancer No family hx of      Other Cancer No family hx of      Depression No family hx of      Anxiety Disorder No family hx of      Mental Illness No family hx of      Substance Abuse No family hx of      Anesthesia Reaction No family hx of      Asthma No family hx of      Osteoporosis No family hx of      Genetic Disorder No family hx of      Thyroid Disease No family hx of      Obesity No family hx of      Unknown/Adopted No family hx of          Current Outpatient Prescriptions   Medication Sig Dispense Refill     ABILIFY 10 MG tablet Take 10 mg by mouth daily        buPROPion (WELLBUTRIN XL) 300 MG 24 hr tablet every morning       levothyroxine (SYNTHROID/LEVOTHROID) 50 MCG tablet TAKE 1 TABLET BY MOUTH DAILY 90 tablet 3     lithium (ESKALITH/LITHOBID) 300 MG CR tablet        OLANZapine (ZYPREXA) 2.5 MG tablet        omega 3 1000 MG CAPS Take 1 g by mouth 2 times daily (with meals) 60 capsule 3     omeprazole (PRILOSEC) 20 MG CR capsule TAKE 1 CAPSULE BY MOUTH DAILY (30-60 MINUTE(S) BEFORE A MEAL) 90 capsule 3     Sod Fluoride-Potassium Nitrate (PREVIDENT 5000 ENAMEL PROTECT) 1.1-5 % PSTE Apply 1 Device to affected area 2 times daily 1 Tube 6     terbinafine (LAMISIL AT) 1 % cream Apply topically 2 times daily For fungal infection not resolved with other antifungals (e.g. Clotrimazole) 80 g 1     triamcinolone (KENALOG) 0.1 % cream Apply sparingly to affected area two times daily for 14 days. 15 g 0     OLANZapine (ZYPREXA) 5 MG tablet Take 5 mg by mouth At Bedtime Pt taking 1/2 tab ( 2.5 mg) daily at bedtime       Allergies   Allergen Reactions     Depakote [Valproic Acid]      Ibuprofen Hives     Pt on Lithium and CANNOT take ibuprofen with this medication     Recent Labs   Lab Test  07/25/18   1044  01/09/18   1116  11/14/17   1139  11/13/17   1257   12/14/16   1335  10/19/16   1619  05/25/16   1145   11/18/15   1522  10/14/15   1320   A1C   --   5.0   --    --    --    --    --    5.4   --    --   5.5   LDL  Cannot estimate LDL when triglyceride exceeds 400 mg/dL   --    --    --    --   131*   --   131*   --   150*   --    HDL  47   --    --    --    --   72   --   69   --   78   --    TRIG  641*   --    --    --    --   113   --   112   --   157*   --    ALT   --    --    --   35   --    --   33   --    --   27  28   CR   --    --    --   1.27*   --    --   1.14  1.15   --   1.09  1.17   GFRESTIMATED   --    --    --   69   --    --   78  78   --   83  77   GFRESTBLACK   --    --    --   83   --    --   >90   GFR Calc    >90   GFR Calc     --   >90   GFR Calc    >90   GFR Calc     POTASSIUM  4.0   --    --   3.8   --    --   4.1  4.1   --   4.2  4.0   TSH  1.78   --   0.57   --    < >   --   0.02*  2.04   < >  0.91   --     < > = values in this interval not displayed.      BP Readings from Last 3 Encounters:   08/31/18 130/80   05/08/18 132/84   04/20/18 136/87    Wt Readings from Last 3 Encounters:   08/31/18 193 lb (87.5 kg)   05/08/18 191 lb 14.4 oz (87 kg)   04/20/18 193 lb (87.5 kg)                  Labs reviewed in EPIC    Reviewed and updated as needed this visit by clinical staff       Reviewed and updated as needed this visit by Provider         ROS:  Constitutional, HEENT, cardiovascular, pulmonary, GI, , musculoskeletal, neuro, skin, endocrine and psych systems are negative, except as otherwise noted.    OBJECTIVE:     /80  Pulse 71  Temp 97.6  F (36.4  C) (Oral)  Resp 20  Wt 193 lb (87.5 kg)  SpO2 100%  BMI 28.92 kg/m2  Body mass index is 28.92 kg/(m^2).  GENERAL: healthy, alert and no distress  EYES: Eyes grossly normal to inspection, PERRL and conjunctivae and sclerae normal  NECK: no adenopathy, no asymmetry, masses, or scars and thyroid normal to palpation  RESP: lungs clear to auscultation - no rales, rhonchi or wheezes  CV: regular rate and rhythm, normal S1 S2, no S3 or S4, no murmur, click or  rub, no peripheral edema and peripheral pulses strong  ABDOMEN: soft, nontender, no hepatosplenomegaly, no masses and bowel sounds normal   (male): normal male genitalia without lesions or urethral discharge, no hernia  MS: no gross musculoskeletal defects noted, no edema    Diagnostic Test Results:  No results found for this or any previous visit (from the past 24 hour(s)).    ASSESSMENT/PLAN:             1. Inguinal pain, right  His hip is normal , no pain with internal and external rotation in the joint , seems more like a pulled muscle , discussed ice packs, limit his physical activity and basketball for now , also if any pain with voiding will let me know , will take Ibuprofen as needed and RTC if no improving or worsening.      2. Screen for STD (sexually transmitted disease)  Requested screen but otherwise no symtpoms , a new partner recently .  - HIV Antigen Antibody Combo  - Treponema Abs w Reflex to RPR and Titer  - Hepatitis C antibody  - NEISSERIA GONORRHOEA PCR  - CHLAMYDIA TRACHOMATIS PCR    RTC if no improving or worsening.  Pt is aware  and comfortable with the current plan.      Tatiana Juan MD  [facility]

## 2018-09-01 LAB — T PALLIDUM AB SER QL: NONREACTIVE

## 2018-09-04 LAB
HCV AB SERPL QL IA: NONREACTIVE
HIV 1+2 AB+HIV1 P24 AG SERPL QL IA: NONREACTIVE

## 2018-10-01 DIAGNOSIS — E78.2 ELEVATED CHOLESTEROL WITH ELEVATED TRIGLYCERIDES: ICD-10-CM

## 2018-10-02 RX ORDER — CHLORAL HYDRATE 500 MG
CAPSULE ORAL
Refills: 0
Start: 2018-10-02

## 2018-10-02 NOTE — TELEPHONE ENCOUNTER
"Requested Prescriptions   Pending Prescriptions Disp Refills     fish oil-omega-3 fatty acids 1000 MG capsule [Pharmacy Med Name: FISH OIL 1000MG CAP] 90 capsule      Sig: TAKE 1 G BY MOUTH 2 TIMES DAILY (WITH MEALS)    Vitamin Supplements (Adult) Protocol Passed    10/1/2018  3:30 PM       Passed - High dose Vitamin D not ordered       Passed - Recent (12 mo) or future (30 days) visit within the authorizing provider's specialty    Patient had office visit in the last 12 months or has a visit in the next 30 days with authorizing provider or within the authorizing provider's specialty.  See \"Patient Info\" tab in inbasket, or \"Choose Columns\" in Meds & Orders section of the refill encounter.              Duplicate, pharmacy informed. Sent 8/15/18 for 60 with 3 refills.     "

## 2018-10-05 DIAGNOSIS — E78.2 ELEVATED CHOLESTEROL WITH ELEVATED TRIGLYCERIDES: ICD-10-CM

## 2018-10-08 RX ORDER — CHLORAL HYDRATE 500 MG
CAPSULE ORAL
Start: 2018-10-08

## 2018-10-08 NOTE — TELEPHONE ENCOUNTER
Spoke with Zuleyka from [facility]   She states the PT has straight MA which requires a full stock bottle ( 90 capsules at a time)   So they had to adjust the quantity of Rx, there are some remaining on the refill but not enough to   Cover a full Rx which is needed   Ph. [phone]

## 2018-10-08 NOTE — TELEPHONE ENCOUNTER
"Denied  Too early; Rx sent 8/15/2018 for 4 months  Colleen LOPES RN    Requested Prescriptions   Pending Prescriptions Disp Refills     fish oil-omega-3 fatty acids 1000 MG capsule [Pharmacy Med Name: FISH OIL 1000MG CAP] 90 capsule      Sig: TAKE 1 G BY MOUTH 2 TIMES DAILY (WITH MEALS)    Vitamin Supplements (Adult) Protocol Passed    10/5/2018  1:49 PM       Passed - High dose Vitamin D not ordered       Passed - Recent (12 mo) or future (30 days) visit within the authorizing provider's specialty    Patient had office visit in the last 12 months or has a visit in the next 30 days with authorizing provider or within the authorizing provider's specialty.  See \"Patient Info\" tab in inbasket, or \"Choose Columns\" in Meds & Orders section of the refill encounter.            Next 5 appointments (look out 90 days)     Nov 20, 2018  2:00 PM CST   PHYSICAL with Tatiana Juan MD   [facility]    [address] [facility] [facility] [address] [phone]                  "

## 2018-10-09 RX ORDER — OMEGA-3 FATTY ACIDS/FISH OIL 300-1000MG
1 CAPSULE ORAL 2 TIMES DAILY WITH MEALS
Qty: 60 CAPSULE | Refills: 3 | Status: SHIPPED | OUTPATIENT
Start: 2018-10-09 | End: 2019-03-04

## 2018-10-09 NOTE — TELEPHONE ENCOUNTER
Zuleyka calling back looking to follow up on this.  He has two days worth of pills left. Ph. [phone]

## 2018-10-26 ENCOUNTER — MEDICAL CORRESPONDENCE (OUTPATIENT)
Dept: HEALTH INFORMATION MANAGEMENT | Facility: CLINIC | Age: 28
End: 2018-10-26

## 2018-10-29 ENCOUNTER — TELEPHONE (OUTPATIENT)
Dept: FAMILY MEDICINE | Facility: CLINIC | Age: 28
End: 2018-10-29

## 2018-10-29 NOTE — TELEPHONE ENCOUNTER
Received forms from [employer]  Faxed to [phone]  Made copy and sent to [facility]  Called patient NO  .[email or web address] [email or web address]

## 2018-11-27 ENCOUNTER — OFFICE VISIT (OUTPATIENT)
Dept: FAMILY MEDICINE | Facility: CLINIC | Age: 28
End: 2018-11-27
Payer: MEDICARE

## 2018-11-27 VITALS
TEMPERATURE: 97.7 F | RESPIRATION RATE: 14 BRPM | HEIGHT: 69 IN | HEART RATE: 66 BPM | WEIGHT: 192.5 LBS | DIASTOLIC BLOOD PRESSURE: 81 MMHG | BODY MASS INDEX: 28.51 KG/M2 | OXYGEN SATURATION: 100 % | SYSTOLIC BLOOD PRESSURE: 128 MMHG

## 2018-11-27 DIAGNOSIS — Z23 NEED FOR PROPHYLACTIC VACCINATION AND INOCULATION AGAINST INFLUENZA: Primary | ICD-10-CM

## 2018-11-27 DIAGNOSIS — Z00.00 ENCOUNTER FOR ROUTINE ADULT HEALTH EXAMINATION WITHOUT ABNORMAL FINDINGS: ICD-10-CM

## 2018-11-27 DIAGNOSIS — N50.819 TESTICULAR PAIN: ICD-10-CM

## 2018-11-27 LAB
ALBUMIN UR-MCNC: NEGATIVE MG/DL
APPEARANCE UR: CLEAR
BILIRUB UR QL STRIP: NEGATIVE
COLOR UR AUTO: YELLOW
GLUCOSE UR STRIP-MCNC: NEGATIVE MG/DL
HGB UR QL STRIP: NEGATIVE
KETONES UR STRIP-MCNC: NEGATIVE MG/DL
LEUKOCYTE ESTERASE UR QL STRIP: NEGATIVE
NITRATE UR QL: NEGATIVE
PH UR STRIP: 7.5 PH (ref 5–7)
SOURCE: ABNORMAL
SP GR UR STRIP: 1.01 (ref 1–1.03)
UROBILINOGEN UR STRIP-ACNC: 0.2 EU/DL (ref 0.2–1)

## 2018-11-27 PROCEDURE — 81003 URINALYSIS AUTO W/O SCOPE: CPT | Performed by: FAMILY MEDICINE

## 2018-11-27 PROCEDURE — 99395 PREV VISIT EST AGE 18-39: CPT | Performed by: FAMILY MEDICINE

## 2018-11-27 PROCEDURE — 99213 OFFICE O/P EST LOW 20 MIN: CPT | Mod: 25 | Performed by: FAMILY MEDICINE

## 2018-11-27 NOTE — MR AVS SNAPSHOT
After Visit Summary   11/27/2018    Jerrod Solis    MRN: [id number]           Patient Information     Date Of Birth          1990        Visit Information        Provider Department      11/27/2018 11:00 AM Tatiana Juan MD [facility]        Today's Diagnoses     Need for prophylactic vaccination and inoculation against influenza    -  1    Encounter for routine adult health examination without abnormal findings        Testicular pain          Care Instructions      Preventive Health Recommendations:     See your health care provider every year to    Review health changes.     Discuss preventive care.      Review your medicines if your doctor has prescribed any.    Talk with your health care provider about whether you should have a test to screen for prostate cancer (PSA).    Every 3 years, have a diabetes test (fasting glucose). If you are at risk for diabetes, you should have this test more often.    Every 5 years, have a cholesterol test. Have this test more often if you are at risk for high cholesterol or heart disease.     Every 10 years, have a colonoscopy. Or, have a yearly FIT test (stool test). These exams will check for colon cancer.    Talk to with your health care provider about screening for Abdominal Aortic Aneurysm if you have a family history of AAA or have a history of smoking.  Shots:     Get a flu shot each year.     Get a tetanus shot every 10 years.     Talk to your doctor about your pneumonia vaccines. There are now two you should receive - Pneumovax (PPSV 23) and Prevnar (PCV 13).    Talk to your pharmacist about a shingles vaccine.     Talk to your doctor about the hepatitis B vaccine.  Nutrition:     Eat at least 5 servings of fruits and vegetables each day.     Eat whole-grain bread, whole-wheat pasta and brown rice instead of white grains and rice.     Get adequate Calcium and Vitamin D.   Lifestyle    Exercise for at least 150 minutes a week (30 minutes a  day, 5 days a week). This will help you control your weight and prevent disease.     Limit alcohol to one drink per day.     No smoking.     Wear sunscreen to prevent skin cancer.     See your dentist every six months for an exam and cleaning.     See your eye doctor every 1 to 2 years to screen for conditions such as glaucoma, macular degeneration and cataracts.    Personalized Prevention Plan  You are due for the preventive services outlined below.  Your care team is available to assist you in scheduling these services.  If you have already completed any of these items, please share that information with your care team to update in your medical record.    Health Maintenance Due   Topic Date Due     Flu Vaccine (1) 09/01/2018             Follow-ups after your visit        Future tests that were ordered for you today     Open Future Orders        Priority Expected Expires Ordered    US Testicular & Scrotum w Doppler Ltd Routine  11/27/2019 11/27/2018            Who to contact     If you have questions or need follow up information about today's clinic visit or your schedule please contact [facility] directly at [phone].  Normal or non-critical lab and imaging results will be communicated to you by MyChart, letter or phone within 4 business days after the clinic has received the results. If you do not hear from us within 7 days, please contact the clinic through [employer] or phone. If you have a critical or abnormal lab result, we will notify you by phone as soon as possible.  Submit refill requests through [employer] or call your pharmacy and they will forward the refill request to us. Please allow 3 business days for your refill to be completed.          Additional Information About Your Visit        [employer] Information     [employer] lets you send messages to your doctor, view your test results, renew your prescriptions, schedule appointments and more. To sign up, go to [email or web address] . Click on  "\"Log in\" on the left side of the screen, which will take you to the Welcome page. Then click on \"Sign up Now\" on the right side of the page.     You will be asked to enter the access code listed below, as well as some personal information. Please follow the directions to create your username and password.     Your access code is: [email or web address]  Expires: 2019 12:05 PM     Your access code will  in 90 days. If you need help or a new code, please call your [place] clinic or [phone].        Care EveryWhere ID     This is your Care EveryWhere ID. This could be used by other organizations to access your [place] medical records  [id number]        Your Vitals Were     Pulse Temperature Respirations Height Pulse Oximetry BMI (Body Mass Index)    66 97.7  F (36.5  C) (Oral) 14 5' 8.5\" (1.74 m) 100% 28.84 kg/m2       Blood Pressure from Last 3 Encounters:   18 128/81   18 130/80   18 132/84    Weight from Last 3 Encounters:   18 192 lb 8 oz (87.3 kg)   18 193 lb (87.5 kg)   18 191 lb 14.4 oz (87 kg)              We Performed the Following     *UA reflex to Microscopic and Culture ([place] and [facility] (except [place] and [place])     ADMIN INFLUENZA (For MEDICARE Patients ONLY) []     FLU VACCINE, SPLIT VIRUS, IM (QUADRIVALENT) [52068]- >3 YRS        Primary Care Provider Office Phone # Fax #    Tatiana Juan [phone] [address] [address]        Equal Access to Services     DAYNE TURNER : Dionicio Bazzi, rosita birch, araseli olivier. So [facility] [phone].    ATENCIÓN: Si habla español, tiene a rock disposición servicios gratuitos de asistencia lingüística. Aislinn blanco [phone].    We comply with applicable federal civil rights laws and [place] laws. We do not discriminate on the basis of race, color, national origin, age, disability, " sex, sexual orientation, or gender identity.            Thank you!     Thank you for choosing [facility]  for your care. Our goal is always to provide you with excellent care. Hearing back from our patients is one way we can continue to improve our services. Please take a few minutes to complete the written survey that you may receive in the mail after your visit with us. Thank you!             Your Updated Medication List - Protect others around you: Learn how to safely use, store and throw away your medicines at www.disposemymeds.org.          This list is accurate as of 11/27/18 12:05 PM.  Always use your most recent med list.                   Brand Name Dispense Instructions for use Diagnosis    ABILIFY 10 MG tablet   Generic drug:  ARIPiprazole      Take 10 mg by mouth daily        buPROPion 300 MG 24 hr tablet    WELLBUTRIN XL     every morning        levothyroxine 50 MCG tablet    SYNTHROID/LEVOTHROID    90 tablet    TAKE 1 TABLET BY MOUTH DAILY    Hypothyroidism, unspecified type       lithium 300 MG CR tablet    ESKALITH/LITHOBID          * OLANZapine 5 MG tablet    zyPREXA     Take 5 mg by mouth At Bedtime Pt taking 1/2 tab ( 2.5 mg) daily at bedtime        * OLANZapine 2.5 MG tablet    zyPREXA          omega 3 1000 MG Caps     60 capsule    Take 1 g by mouth 2 times daily (with meals)    Elevated cholesterol with elevated triglycerides       omeprazole 20 MG DR capsule    priLOSEC    90 capsule    TAKE 1 CAPSULE BY MOUTH DAILY (30-60 MINUTE(S) BEFORE A MEAL)    Gastroesophageal reflux disease without esophagitis       Sod Fluoride-Potassium Nitrate 1.1-5 % Pste    PREVIDENT 5000 ENAMEL PROTECT    1 Tube    Apply 1 Device to affected area 2 times daily    Encounter for other general examination (CODE)       terbinafine 1 % cream    lamISIL AT    80 g    Apply topically 2 times daily For fungal infection not resolved with other antifungals (e.g. Clotrimazole)    Tinea pedis of left foot        triamcinolone 0.1 % cream    KENALOG    15 g    Apply sparingly to affected area two times daily for 14 days.    Rash       * Notice:  This list has 2 medication(s) that are the same as other medications prescribed for you. Read the directions carefully, and ask your doctor or other care provider to review them with you.

## 2018-11-27 NOTE — NURSING NOTE
"Chief Complaint   Patient presents with     Physical       Initial /81  Pulse 66  Temp 97.7  F (36.5  C) (Oral)  Resp 14  Ht 5' 8.5\" (1.74 m)  Wt 192 lb 8 oz (87.3 kg)  SpO2 100%  BMI 28.84 kg/m2 Estimated body mass index is 28.84 kg/(m^2) as calculated from the following:    Height as of this encounter: 5' 8.5\" (1.74 m).    Weight as of this encounter: 192 lb 8 oz (87.3 kg).  BP completed using cuff size: large    Health Maintenance that is potentially due pending provider review:  Health Maintenance Due   Topic Date Due     INFLUENZA VACCINE (1) 09/01/2018         Flu vaccine today    Injectable Influenza Immunization Documentation    1.  Is the person to be vaccinated sick today?   No    2. Does the person to be vaccinated have an allergy to a component   of the vaccine?   No  Egg Allergy Algorithm Link    3. Has the person to be vaccinated ever had a serious reaction   to influenza vaccine in the past?   No    4. Has the person to be vaccinated ever had Guillain-Barré syndrome?   No    Form completed by patient/caregiver and ELVIRA Newton CMA        "

## 2018-11-27 NOTE — PROGRESS NOTES
SUBJECTIVE:   CC: Jerrod Solis is an 28 year old male who presents for preventative health visit.     Physical   Annual:     Getting at least 3 servings of Calcium per day:  Yes    Bi-annual eye exam:  Yes    Dental care twice a year:  Yes    Sleep apnea or symptoms of sleep apnea:  Daytime drowsiness    Diet:  Regular (no restrictions)    Frequency of exercise:  None    Taking medications regularly:  Yes    Medication side effects:  None    Additional concerns today:  Yes    PHQ-2 Total Score: 2      1)Testicular pain > 1 month, seems that both testicles hurt and he does not think there is a lump but not sure and wants me to check , he denies any dysuria , no frequency , no urgency , no hematuria , no penile discharge , no new partners         Today's PHQ-2 Score:   PHQ-2 ( 1999 Pf[employer]) 11/27/2018   Q1: Little interest or pleasure in doing things 1   Q2: Feeling down, depressed or hopeless 1   PHQ-2 Score 2   Q1: Little interest or pleasure in doing things Several days   Q2: Feeling down, depressed or hopeless Several days   PHQ-2 Score 2       Abuse: Current or Past(Physical, Sexual or Emotional)- No  Do you feel safe in your environment? Yes    Social History   Substance Use Topics     Smoking status: Former Smoker     Smokeless tobacco: Never Used     Alcohol use No     Alcohol Use 11/27/2018   If you drink alcohol do you typically have greater than 3 drinks per day OR greater than 7 drinks per week? Not Applicable       Last PSA: No results found for: PSA    Reviewed orders with patient. 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 No pertinent surgical history.    Social History   Substance Use Topics     Smoking status: Former Smoker     Smokeless tobacco: Never Used     Alcohol use No     Family History   Problem Relation Age of Onset     Diabetes No family hx of      Coronary Artery Disease No family hx of      Hypertension No family hx of      Hyperlipidemia No family hx of      Cerebrovascular Disease No family hx of      Breast Cancer No family hx of      Colon Cancer No family hx of      Prostate Cancer No family hx of      Other Cancer No family hx of      Depression No family hx of      Anxiety Disorder No family hx of      Mental Illness No family hx of      Substance Abuse No family hx of      Anesthesia Reaction No family hx of      Asthma No family hx of      Osteoporosis No family hx of      Genetic Disorder No family hx of      Thyroid Disease No family hx of      Obesity No family hx of      Unknown/Adopted No family hx of          Current Outpatient Prescriptions   Medication Sig Dispense Refill     ABILIFY 10 MG tablet Take 10 mg by mouth daily        buPROPion (WELLBUTRIN XL) 300 MG 24 hr tablet every morning       levothyroxine (SYNTHROID/LEVOTHROID) 50 MCG tablet TAKE 1 TABLET BY MOUTH DAILY 90 tablet 3     lithium (ESKALITH/LITHOBID) 300 MG CR tablet        OLANZapine (ZYPREXA) 2.5 MG tablet        OLANZapine (ZYPREXA) 5 MG tablet Take 5 mg by mouth At Bedtime Pt taking 1/2 tab (  2.5 mg) daily at bedtime       omega 3 1000 MG CAPS Take 1 g by mouth 2 times daily (with meals) 60 capsule 3     omeprazole (PRILOSEC) 20 MG CR capsule TAKE 1 CAPSULE BY MOUTH DAILY (30-60 MINUTE(S) BEFORE A MEAL) 90 capsule 3     Sod Fluoride-Potassium Nitrate (PREVIDENT 5000 ENAMEL PROTECT) 1.1-5 % PSTE Apply 1 Device to affected area 2 times daily 1 Tube 6     terbinafine (LAMISIL AT) 1 % cream Apply topically 2 times daily For fungal infection not resolved with other antifungals (e.g. Clotrimazole) 80 g 1     triamcinolone (KENALOG) 0.1 % cream Apply sparingly to affected area two times daily for 14 days. 15 g 0     Allergies   Allergen Reactions     Depakote [Valproic Acid]      Ibuprofen Hives     Pt on Lithium and CANNOT take ibuprofen with this medication     Recent Labs   Lab Test  07/25/18   1044  01/09/18   1116  11/14/17   1139  11/13/17   1257   12/14/16   1335  10/19/16   1619  05/25/16   1145   11/18/15   1522  10/14/15   1320   A1C   --   5.0   --    --    --    --    --   5.4   --    --   5.5   LDL  Cannot estimate LDL when triglyceride exceeds 400 mg/dL   --    --    --    --   131*   --   131*   --   150*   --    HDL  47   --    --    --    --   72   --   69   --   78   --    TRIG  641*   --    --    --    --   113   --   112   --   157*   --    ALT   --    --    --   35   --    --   33   --    --   27  28   CR   --    --    --   1.27*   --    --   1.14  1.15   --   1.09  1.17   GFRESTIMATED   --    --    --   69   --    --   78  78   --   83  77   GFRESTBLACK   --    --    --   83   --    --   >90   GFR Calc    >90   GFR Calc     --   >90   GFR Calc    >90   GFR Calc     POTASSIUM  4.0   --    --   3.8   --    --   4.1  4.1   --   4.2  4.0   TSH  1.78   --   0.57   --    < >   --   0.02*  2.04   < >  0.91   --     < > = values in this interval not displayed.        Reviewed and updated as needed this visit by clinical staff          Reviewed and updated as needed this visit by Provider        Past Medical History:   Diagnosis Date     Alcohol affecting fetus or  via placenta or breast milk      Attention deficit disorder with hyperactivity(314.01)      Closed fracture of base of other metacarpal bone(s)     R 5th metacarpal     Mood disorder in conditions classified elsewhere      Oppositional defiant disorder of childhood or adolescence      Traumatic shock (H)     PTSD, reactive attachment disorder     Unspecified disturbance of conduct     explosive behavior disorder      History reviewed. No pertinent surgical history.    Review of Systems  CONSTITUTIONAL: NEGATIVE for fever, chills, change in weight  INTEGUMENTARY/SKIN: NEGATIVE for worrisome rashes, moles or lesions  EYES: NEGATIVE for vision changes or irritation  ENT: NEGATIVE for ear, mouth and throat problems  RESP: NEGATIVE for significant cough or SOB  CV: NEGATIVE for chest pain, palpitations or peripheral edema  GI: NEGATIVE for nausea, abdominal pain, heartburn, or change in bowel habits   male: negative for dysuria, hematuria, decreased urinary stream, erectile dysfunction, urethral discharge  MUSCULOSKELETAL: NEGATIVE for significant arthralgias or myalgia  NEURO: NEGATIVE for weakness, dizziness or paresthesias  PSYCHIATRIC: NEGATIVE for changes in mood or affect    OBJECTIVE:   There were no vitals taken for this visit.    Physical Exam  GENERAL: healthy, alert and no distress  EYES: Eyes grossly normal to inspection, PERRL and conjunctivae and sclerae normal  HENT: ear canals and TM's normal, nose and mouth without ulcers or lesions  NECK: no adenopathy, no asymmetry, masses, or scars and thyroid normal to palpation  RESP: lungs clear to auscultation - no rales, rhonchi or wheezes  CV: regular rate and rhythm, normal S1 S2, no S3 or S4, no murmur, click or rub, no peripheral edema and peripheral pulses strong  ABDOMEN: soft, nontender, no hepatosplenomegaly,  no masses and bowel sounds normal   (male): normal male genitalia without lesions or urethral discharge, no hernia  MS: no gross musculoskeletal defects noted, no edema  SKIN: no suspicious lesions or rashes  NEURO: Normal strength and tone, mentation intact and speech normal  PSYCH: mentation appears normal, affect normal/bright    Diagnostic Test Results:  Results for orders placed or performed in visit on 11/27/18 (from the past 24 hour(s))   *UA reflex to Microscopic and Culture ([place] and [facility] (except [place] and [place])   Result Value Ref Range    Color Urine Yellow     Appearance Urine Clear     Glucose Urine Negative NEG^Negative mg/dL    Bilirubin Urine Negative NEG^Negative    Ketones Urine Negative NEG^Negative mg/dL    Specific Gravity Urine 1.015 1.003 - 1.035    Blood Urine Negative NEG^Negative    pH Urine 7.5 (H) 5.0 - 7.0 pH    Protein Albumin Urine Negative NEG^Negative mg/dL    Urobilinogen Urine 0.2 0.2 - 1.0 EU/dL    Nitrite Urine Negative NEG^Negative    Leukocyte Esterase Urine Negative NEG^Negative    Source Midstream Urine        ASSESSMENT/PLAN:   1. Encounter for routine adult health examination without abnormal findings  No need for labs today     2. Need for prophylactic vaccination and inoculation against influenza  Got his flu shot today   - FLU VACCINE, SPLIT VIRUS, IM (QUADRIVALENT) [86240]- >3 YRS  - ADMIN INFLUENZA (For MEDICARE Patients ONLY) []    3. Testicular pain  Will check UA today to r/u epididymitis , also will do the US as not able to palpate a lum but pt has been having pain there for a few months now   - *UA reflex to Microscopic and Culture ([place] and [facility] (except [place] and [place])  - US Testicular & Scrotum w Doppler Ltd; Future    COUNSELING:   Reviewed preventive health counseling, as reflected in patient instructions       Regular exercise       Healthy diet/nutrition       Vision screening       Safe sex practices/STD  "prevention    BP Readings from Last 1 Encounters:   08/31/18 130/80     Estimated body mass index is 28.92 kg/(m^2) as calculated from the following:    Height as of 5/8/18: 5' 8.5\" (1.74 m).    Weight as of 8/31/18: 193 lb (87.5 kg).           reports that he has quit smoking. He has never used smokeless tobacco.      Counseling Resources:  ATP IV Guidelines  Pooled Cohorts Equation Calculator  FRAX Risk Assessment  ICSI Preventive Guidelines  Dietary Guidelines for Americans, 2010  USDA's MyPlate  ASA Prophylaxis  Lung CA Screening    Tatiana Juan MD  [facility]       "

## 2018-12-03 DIAGNOSIS — K21.9 GASTROESOPHAGEAL REFLUX DISEASE WITHOUT ESOPHAGITIS: ICD-10-CM

## 2018-12-04 NOTE — TELEPHONE ENCOUNTER
"Prescription approved per [facility] Refill Protocol.  Colleen LOPES RN    Requested Prescriptions   Pending Prescriptions Disp Refills     omeprazole (PRILOSEC) 20 MG DR capsule [Pharmacy Med Name: OMEPRAZOLE 20MG CAP] 30 capsule      Sig: TAKE 1 CAPSULE BY MOUTH DAILY (30-60 MINUTE(S) BEFORE A MEAL)    PPI Protocol Passed    12/3/2018  2:40 PM       Passed - Not on Clopidogrel (unless Pantoprazole ordered)       Passed - No diagnosis of osteoporosis on record       Passed - Recent (12 mo) or future (30 days) visit within the authorizing provider's specialty    Patient had office visit in the last 12 months or has a visit in the next 30 days with authorizing provider or within the authorizing provider's specialty.  See \"Patient Info\" tab in inbasket, or \"Choose Columns\" in Meds & Orders section of the refill encounter.             Passed - Patient is age 18 or older            "

## 2018-12-06 ENCOUNTER — TELEPHONE (OUTPATIENT)
Dept: FAMILY MEDICINE | Facility: CLINIC | Age: 28
End: 2018-12-06

## 2018-12-06 NOTE — TELEPHONE ENCOUNTER
Reason for Call:  Other Homecare question    Detailed comments: The patient refused to get the US and the Nurse wants to know if there is another way to determine his pain   Is it ok that he does not have this test done?    Phone Number Patient can be reached at: Laura with [facility]    Best Time: anytime    Can we leave a detailed message on this number? YES    Call taken on 12/6/2018 at 11:11 AM by Bertha Flores

## 2018-12-06 NOTE — TELEPHONE ENCOUNTER
LS,   Laura, pt's homecare nurse calling  Patient uncomfortable with doing testicular/scrotal US  Wondering if needs US based on UA or if there is a different kind of imaging that could be done  Please advise  Thanks,  Colleen LOPES RN

## 2018-12-07 NOTE — TELEPHONE ENCOUNTER
No there is no other imaging - can you let him know that ?  It I not painful.  I really want him to do it because UA and exam does not rule out much.  Thanks

## 2019-01-03 DIAGNOSIS — E03.9 HYPOTHYROIDISM, UNSPECIFIED TYPE: ICD-10-CM

## 2019-01-03 RX ORDER — LEVOTHYROXINE SODIUM 50 UG/1
TABLET ORAL
Qty: 90 TABLET | Refills: 1 | Status: SHIPPED | OUTPATIENT
Start: 2019-01-03 | End: 2019-06-03

## 2019-01-03 NOTE — TELEPHONE ENCOUNTER
"Requested Prescriptions   Pending Prescriptions Disp Refills     levothyroxine (SYNTHROID/LEVOTHROID) 50 MCG tablet [Pharmacy Med Name: LEVOTHYROXINE 50MCG TAB] 30 tablet      Sig: TAKE 1 TABLET BY MOUTH EVERY MORNING    Thyroid Protocol Passed - 1/3/2019  9:19 AM       Passed - Patient is 12 years or older       Passed - Recent (12 mo) or future (30 days) visit within the authorizing provider's specialty    Patient had office visit in the last 12 months or has a visit in the next 30 days with authorizing provider or within the authorizing provider's specialty.  See \"Patient Info\" tab in inbasket, or \"Choose Columns\" in Meds & Orders section of the refill encounter.             Passed - Normal TSH on file in past 12 months    Recent Labs   Lab Test 07/25/18  1044   TSH 1.78              "

## 2019-01-14 PROBLEM — M25.562 ACUTE PAIN OF LEFT KNEE: Status: RESOLVED | Noted: 2018-05-07 | Resolved: 2019-01-14

## 2019-01-14 NOTE — PROGRESS NOTES
Subjective:  HPI                    Objective:  System    Physical Exam    General     ROS    Assessment/Plan:    DISCHARGE REPORT    Progress reporting period is from 5/7/2018 to 5/22/2018.     SUBJECTIVE  Subjective: Bought a knee brace which has helped.  Basketball has not been an issue.     Current Pain level: 1/10   Initial Pain level: 2/10   Changes in function: Yes, see goal flow sheet for change in function   Adverse reactions: None;   ,     Patient has failed to return to therapy so current objective findings are unknown.  The subjective and objective information are from the last SOAP note on this patient.    OBJECTIVE  Objective: Difficulty with deadlift.  Fatigue with exercises      ASSESSMENT/PLAN  Updated problem list and treatment plan: Diagnosis 1:  L knee pain  Pain -  self management, education, directional preference exercise and home program  Decreased strength - therapeutic exercise and therapeutic activities  Decreased function - therapeutic activities  STG/LTGs have been met or progress has been made towards goals:  Yes (See Goal flow sheet completed today.)  Assessment of Progress: The patient has not returned to therapy. Current status is unknown.  Self Management Plans:  Patient has been instructed in a home treatment program.    Jerrod continues to require the following intervention to meet STG and LTG's: PT intervention is no longer required to meet STG/LTG.  The patient failed to complete scheduled/ordered appointments so current information is unknown.  We will discharge this patient from PT.    Recommendations:  Canceled last visit and did not reschedule.  Discharge pt from PT.    Please refer to the daily flowsheet for treatment today, total treatment time and time spent performing 1:1 timed codes.

## 2019-01-15 ENCOUNTER — ANCILLARY PROCEDURE (OUTPATIENT)
Dept: ULTRASOUND IMAGING | Facility: CLINIC | Age: 29
End: 2019-01-15
Payer: MEDICARE

## 2019-01-15 DIAGNOSIS — N50.819 TESTICULAR PAIN: ICD-10-CM

## 2019-01-15 PROCEDURE — 93976 VASCULAR STUDY: CPT

## 2019-01-15 PROCEDURE — 76870 US EXAM SCROTUM: CPT | Mod: 59

## 2019-01-16 ENCOUNTER — OFFICE VISIT (OUTPATIENT)
Dept: FAMILY MEDICINE | Facility: CLINIC | Age: 29
End: 2019-01-16
Payer: MEDICARE

## 2019-01-16 VITALS
HEIGHT: 69 IN | TEMPERATURE: 99.4 F | BODY MASS INDEX: 30.21 KG/M2 | DIASTOLIC BLOOD PRESSURE: 82 MMHG | OXYGEN SATURATION: 96 % | SYSTOLIC BLOOD PRESSURE: 144 MMHG | HEART RATE: 70 BPM | WEIGHT: 204 LBS

## 2019-01-16 DIAGNOSIS — N52.9 ERECTILE DYSFUNCTION, UNSPECIFIED ERECTILE DYSFUNCTION TYPE: ICD-10-CM

## 2019-01-16 DIAGNOSIS — N50.819 TESTICULAR PAIN: Primary | ICD-10-CM

## 2019-01-16 PROCEDURE — 99214 OFFICE O/P EST MOD 30 MIN: CPT | Performed by: FAMILY MEDICINE

## 2019-01-16 ASSESSMENT — MIFFLIN-ST. JEOR: SCORE: 1882.78
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
## 2019-03-04 DIAGNOSIS — E78.2 ELEVATED CHOLESTEROL WITH ELEVATED TRIGLYCERIDES: ICD-10-CM

## 2019-03-05 RX ORDER — CHLORAL HYDRATE 500 MG
CAPSULE ORAL
Qty: 180 CAPSULE | Refills: 2 | Status: SHIPPED | OUTPATIENT
Start: 2019-03-05 | End: 2019-11-20

## 2019-03-05 NOTE — TELEPHONE ENCOUNTER
"LS,   Prescription approved per [facility] Refill Protocol.  Rx printed though - \"isn't configured to be e-prescribed\"  Left Rx on your desk to be signed if appropriate  Thanks,  Colleen LOPES RN    "

## 2019-03-05 NOTE — TELEPHONE ENCOUNTER
"Last Written Prescription Date:  10/9/2018  Last Fill Quantity: 60,  # refills: 3   Last office visit: 1/16/2019 with prescribing provider:  Tatiana Juan   Future Office Visit:      Requested Prescriptions   Pending Prescriptions Disp Refills     fish oil-omega-3 fatty acids 1000 MG capsule [Pharmacy Med Name: FISH OIL 1000MG CAP] 90 capsule      Sig: TAKE 1 G BY MOUTH 2 TIMES DAILY (WITH MEALS)    Vitamin Supplements (Adult) Protocol Passed - 3/4/2019 12:38 PM       Passed - High dose Vitamin D not ordered       Passed - Recent (12 mo) or future (30 days) visit within the authorizing provider's specialty    Patient had office visit in the last 12 months or has a visit in the next 30 days with authorizing provider or within the authorizing provider's specialty.  See \"Patient Info\" tab in inbasket, or \"Choose Columns\" in Meds & Orders section of the refill encounter.             Passed - Medication is active on med list        "

## 2019-03-06 DIAGNOSIS — E78.2 ELEVATED CHOLESTEROL WITH ELEVATED TRIGLYCERIDES: ICD-10-CM

## 2019-03-06 RX ORDER — CHLORAL HYDRATE 500 MG
CAPSULE ORAL
Start: 2019-03-06

## 2019-05-03 ENCOUNTER — TRANSFERRED RECORDS (OUTPATIENT)
Dept: HEALTH INFORMATION MANAGEMENT | Facility: CLINIC | Age: 29
End: 2019-05-03

## 2019-06-03 DIAGNOSIS — E03.9 HYPOTHYROIDISM, UNSPECIFIED TYPE: ICD-10-CM

## 2019-06-04 RX ORDER — LEVOTHYROXINE SODIUM 50 UG/1
TABLET ORAL
Qty: 30 TABLET | Refills: 1 | Status: SHIPPED | OUTPATIENT
Start: 2019-06-04 | End: 2019-07-24

## 2019-06-04 NOTE — TELEPHONE ENCOUNTER
Prescription approved per [facility] Refill Protocol.  Due for labs end of July.  Christin Bradley RN

## 2019-06-04 NOTE — TELEPHONE ENCOUNTER
"Last Written Prescription Date:  01/03/2019  Last Fill Quantity: 90,  # refills: 1   Last office visit: 1/16/2019 with prescribing provider:     Future Office Visit:  Requested Prescriptions   Pending Prescriptions Disp Refills     levothyroxine (SYNTHROID/LEVOTHROID) 50 MCG tablet [Pharmacy Med Name: LEVOTHYROXINE 50MCG TAB] 30 tablet      Sig: TAKE 1 TABLET BY MOUTH EVERY MORNING       Thyroid Protocol Passed - 6/3/2019  1:11 PM        Passed - Patient is 12 years or older        Passed - Recent (12 mo) or future (30 days) visit within the authorizing provider's specialty     Patient had office visit in the last 12 months or has a visit in the next 30 days with authorizing provider or within the authorizing provider's specialty.  See \"Patient Info\" tab in inbasket, or \"Choose Columns\" in Meds & Orders section of the refill encounter.              Passed - Medication is active on med list        Passed - Normal TSH on file in past 12 months     Recent Labs   Lab Test 07/25/18  1044   TSH 1.78              "

## 2019-07-24 DIAGNOSIS — E03.9 HYPOTHYROIDISM, UNSPECIFIED TYPE: ICD-10-CM

## 2019-07-25 NOTE — TELEPHONE ENCOUNTER
"LEVOTHYROXINE 50MCG TAB  Last Written Prescription Date:  06/04/2019  Last Fill Quantity: 30,  # refills: 1   Last office visit: 1/16/2019 with prescribing provider:  ROASLIO   Future Office Visit:  12/03/2019 WITH LS  Requested Prescriptions   Pending Prescriptions Disp Refills     levothyroxine (SYNTHROID/LEVOTHROID) 50 MCG tablet [Pharmacy Med Name: LEVOTHYROXINE 50MCG TAB] 30 tablet 1     Sig: TAKE 1 TABLET BY MOUTH EVERY MORNING       Thyroid Protocol Passed - 7/24/2019  1:32 PM        Passed - Patient is 12 years or older        Passed - Recent (12 mo) or future (30 days) visit within the authorizing provider's specialty     Patient had office visit in the last 12 months or has a visit in the next 30 days with authorizing provider or within the authorizing provider's specialty.  See \"Patient Info\" tab in inbasket, or \"Choose Columns\" in Meds & Orders section of the refill encounter.              Passed - Medication is active on med list        Passed - Normal TSH on file in past 12 months     Recent Labs   Lab Test 07/25/18  1044   TSH 1.78              "

## 2019-07-26 RX ORDER — LEVOTHYROXINE SODIUM 50 UG/1
TABLET ORAL
Qty: 30 TABLET | Refills: 0 | Status: SHIPPED | OUTPATIENT
Start: 2019-07-26 | End: 2019-08-22

## 2019-07-26 NOTE — TELEPHONE ENCOUNTER
LS,  11/27/18 encounter was also noted as a physical.  Due for TSH lab though- pended order- not sure if you want to see him also or just come in for lab only.  Please authorize if appropriate.  Thanks,  Mary Lebron RN

## 2019-08-21 ENCOUNTER — MEDICAL CORRESPONDENCE (OUTPATIENT)
Dept: HEALTH INFORMATION MANAGEMENT | Facility: CLINIC | Age: 29
End: 2019-08-21

## 2019-08-22 DIAGNOSIS — E03.9 HYPOTHYROIDISM, UNSPECIFIED TYPE: ICD-10-CM

## 2019-08-23 RX ORDER — LEVOTHYROXINE SODIUM 50 UG/1
TABLET ORAL
Qty: 30 TABLET | Refills: 0 | Status: SHIPPED | OUTPATIENT
Start: 2019-08-23 | End: 2019-09-25

## 2019-08-23 NOTE — TELEPHONE ENCOUNTER
"Prescription approved per [facility] Refill Protocol.  Upcoming appt  Colleen LOPES RN    Last Written Prescription Date:  7/26/2019  Last Fill Quantity: 30,  # refills: 0   Last office visit: 1/16/2019 with prescribing provider:     Future Office Visit:   Next 5 appointments (look out 90 days)    Aug 27, 2019  2:20 PM CDT  Office Visit with Tatiana Juan MD  [facility] ([facility]) [address] [address] [phone]         Requested Prescriptions   Pending Prescriptions Disp Refills     levothyroxine (SYNTHROID/LEVOTHROID) 50 MCG tablet [Pharmacy Med Name: LEVOTHYROXINE 50MCG TAB] 30 tablet 0     Sig: TAKE 1 TABLET BY MOUTH EVERY MORNING       Thyroid Protocol Failed - 8/22/2019  2:10 PM        Failed - Normal TSH on file in past 12 months     Recent Labs   Lab Test 07/25/18  1044   TSH 1.78              Passed - Patient is 12 years or older        Passed - Recent (12 mo) or future (30 days) visit within the authorizing provider's specialty     Patient had office visit in the last 12 months or has a visit in the next 30 days with authorizing provider or within the authorizing provider's specialty.  See \"Patient Info\" tab in inbasket, or \"Choose Columns\" in Meds & Orders section of the refill encounter.              Passed - Medication is active on med list        "

## 2019-09-23 DIAGNOSIS — K08.89 TOOTHACHE: Primary | ICD-10-CM

## 2019-09-23 NOTE — TELEPHONE ENCOUNTER
PRVDNT 5000 ENAML OH PST  Last Written Prescription Date:  07/18/2018  Last Fill Quantity: 1 TUBE,  # refills: 6   Last office visit: 1/16/2019 with prescribing provider:  ROSALIO   Future Office Visit: 10/01 AND 12/03 WITH TATIANA  Next 5 appointments (look out 90 days)    Oct 01, 2019  1:20 PM CDT  Office Visit with Tatiana Juan MD  [facility] ([facility] [place]) [address] [address]  [phone]   Dec 03, 2019 10:20 AM CST  PHYSICAL with Tatiana Juan MD  [facility] ([facility] [place]) [address] [address]  [phone]         Requested Prescriptions   Pending Prescriptions Disp Refills     PREVIDENT 5000 ENAMEL PROTECT 1.1-5 % PSTE [Pharmacy Med Name: PRVDNT 5000 ENAML OH PST] 100 mL      Sig: APPLY TO AFFECTED AREA TWICE A DAY       There is no refill protocol information for this order

## 2019-09-24 NOTE — TELEPHONE ENCOUNTER
LS    Routing refill request to provider for review/approval because:  Drug not on the [facility] refill protocol   Thanks,  Christin Bradley RN

## 2019-09-25 DIAGNOSIS — E03.9 HYPOTHYROIDISM, UNSPECIFIED TYPE: ICD-10-CM

## 2019-09-25 RX ORDER — LEVOTHYROXINE SODIUM 50 UG/1
TABLET ORAL
Qty: 30 TABLET | Refills: 0 | Status: SHIPPED | OUTPATIENT
Start: 2019-09-25 | End: 2019-10-29

## 2019-09-25 NOTE — TELEPHONE ENCOUNTER
"LEVOTHYROXINE 50MCG TAB  Last Written Prescription Date:  08/23/2019  Last Fill Quantity: 30,  # refills: 0   Last office visit: 1/16/2019 with prescribing provider:  ROSALIO   Future Office Visit: 10/01/ AND 12/03 WITH ROSALIO  Next 5 appointments (look out 90 days)    Oct 01, 2019  1:20 PM CDT  Office Visit with Tatiana Juan MD  [facility] ([facility]) [address] [facility] [address]  [phone]   Dec 03, 2019 10:20 AM CST  PHYSICAL with Tatiana Juan MD  [facility] ([facility]) [address] [address] [address]  [phone]         Requested Prescriptions   Pending Prescriptions Disp Refills     levothyroxine (SYNTHROID/LEVOTHROID) 50 MCG tablet [Pharmacy Med Name: LEVOTHYROXINE 50MCG TAB] 30 tablet 0     Sig: TAKE 1 TABLET BY MOUTH EVERY MORNING       Thyroid Protocol Failed - 9/25/2019 11:28 AM        Failed - Normal TSH on file in past 12 months     Recent Labs   Lab Test 07/25/18  1044   TSH 1.78              Passed - Patient is 12 years or older        Passed - Recent (12 mo) or future (30 days) visit within the authorizing provider's specialty     Patient had office visit in the last 12 months or has a visit in the next 30 days with authorizing provider or within the authorizing provider's specialty.  See \"Patient Info\" tab in inbasket, or \"Choose Columns\" in Meds & Orders section of the refill encounter.              Passed - Medication is active on med list        "

## 2019-09-25 NOTE — TELEPHONE ENCOUNTER
Prescription approved per [facility] Refill Protocol. Sent another 30 day supply just in case pt is out, has upcoming appt with PCP

## 2019-10-01 DIAGNOSIS — Z79.899 LONG-TERM USE OF HIGH-RISK MEDICATION: ICD-10-CM

## 2019-10-01 DIAGNOSIS — F34.1 CHRONIC DEPRESSIVE PERSONALITY DISORDER: ICD-10-CM

## 2019-10-01 DIAGNOSIS — F41.1 GENERALIZED ANXIETY DISORDER: Primary | ICD-10-CM

## 2019-10-01 DIAGNOSIS — F34.1 DYSTHYMIC DISORDER: ICD-10-CM

## 2019-10-01 DIAGNOSIS — Z13.220 LIPID SCREENING: ICD-10-CM

## 2019-10-01 LAB
ALBUMIN UR-MCNC: NEGATIVE MG/DL
APPEARANCE UR: CLEAR
BASOPHILS # BLD AUTO: 0.1 10E9/L (ref 0–0.2)
BASOPHILS NFR BLD AUTO: 0.8 %
BILIRUB UR QL STRIP: NEGATIVE
COLOR UR AUTO: YELLOW
DIFFERENTIAL METHOD BLD: ABNORMAL
EOSINOPHIL # BLD AUTO: 0.3 10E9/L (ref 0–0.7)
EOSINOPHIL NFR BLD AUTO: 4.5 %
ERYTHROCYTE [DISTWIDTH] IN BLOOD BY AUTOMATED COUNT: 14.9 % (ref 10–15)
GLUCOSE UR STRIP-MCNC: NEGATIVE MG/DL
HCT VFR BLD AUTO: 43.8 % (ref 40–53)
HGB BLD-MCNC: 13.7 G/DL (ref 13.3–17.7)
HGB UR QL STRIP: NEGATIVE
KETONES UR STRIP-MCNC: NEGATIVE MG/DL
LEUKOCYTE ESTERASE UR QL STRIP: NEGATIVE
LITHIUM SERPL-SCNC: 0.92 MMOL/L (ref 0.6–1.2)
LYMPHOCYTES # BLD AUTO: 3.2 10E9/L (ref 0.8–5.3)
LYMPHOCYTES NFR BLD AUTO: 50.7 %
MCH RBC QN AUTO: 26.6 PG (ref 26.5–33)
MCHC RBC AUTO-ENTMCNC: 31.3 G/DL (ref 31.5–36.5)
MCV RBC AUTO: 85 FL (ref 78–100)
MONOCYTES # BLD AUTO: 0.4 10E9/L (ref 0–1.3)
MONOCYTES NFR BLD AUTO: 6.4 %
NEUTROPHILS # BLD AUTO: 2.4 10E9/L (ref 1.6–8.3)
NEUTROPHILS NFR BLD AUTO: 37.6 %
NITRATE UR QL: NEGATIVE
PH UR STRIP: 7 PH (ref 5–7)
PLATELET # BLD AUTO: 165 10E9/L (ref 150–450)
RBC # BLD AUTO: 5.16 10E12/L (ref 4.4–5.9)
SOURCE: NORMAL
SP GR UR STRIP: 1.01 (ref 1–1.03)
UROBILINOGEN UR STRIP-ACNC: 0.2 EU/DL (ref 0.2–1)
WBC # BLD AUTO: 6.4 10E9/L (ref 4–11)

## 2019-10-01 PROCEDURE — 80178 ASSAY OF LITHIUM: CPT | Performed by: NURSE PRACTITIONER

## 2019-10-01 PROCEDURE — 80061 LIPID PANEL: CPT | Performed by: NURSE PRACTITIONER

## 2019-10-01 PROCEDURE — 85025 COMPLETE CBC W/AUTO DIFF WBC: CPT | Performed by: NURSE PRACTITIONER

## 2019-10-01 PROCEDURE — 81003 URINALYSIS AUTO W/O SCOPE: CPT | Performed by: NURSE PRACTITIONER

## 2019-10-01 PROCEDURE — 80053 COMPREHEN METABOLIC PANEL: CPT | Performed by: NURSE PRACTITIONER

## 2019-10-01 PROCEDURE — 36415 COLL VENOUS BLD VENIPUNCTURE: CPT | Performed by: NURSE PRACTITIONER

## 2019-10-01 PROCEDURE — 84443 ASSAY THYROID STIM HORMONE: CPT | Performed by: NURSE PRACTITIONER

## 2019-10-02 LAB
ALBUMIN SERPL-MCNC: 4.1 G/DL (ref 3.4–5)
ALP SERPL-CCNC: 92 U/L (ref 40–150)
ALT SERPL W P-5'-P-CCNC: 23 U/L (ref 0–70)
ANION GAP SERPL CALCULATED.3IONS-SCNC: 4 MMOL/L (ref 3–14)
AST SERPL W P-5'-P-CCNC: 13 U/L (ref 0–45)
BILIRUB SERPL-MCNC: 0.6 MG/DL (ref 0.2–1.3)
BUN SERPL-MCNC: 10 MG/DL (ref 7–30)
CALCIUM SERPL-MCNC: 9.5 MG/DL (ref 8.5–10.1)
CHLORIDE SERPL-SCNC: 108 MMOL/L (ref 94–109)
CHOLEST SERPL-MCNC: 248 MG/DL
CO2 SERPL-SCNC: 27 MMOL/L (ref 20–32)
CREAT SERPL-MCNC: 1.32 MG/DL (ref 0.66–1.25)
GFR SERPL CREATININE-BSD FRML MDRD: 73 ML/MIN/{1.73_M2}
GLUCOSE SERPL-MCNC: 90 MG/DL (ref 70–99)
HDLC SERPL-MCNC: 55 MG/DL
LDLC SERPL CALC-MCNC: 167 MG/DL
NONHDLC SERPL-MCNC: 193 MG/DL
POTASSIUM SERPL-SCNC: 4 MMOL/L (ref 3.4–5.3)
PROT SERPL-MCNC: 7.6 G/DL (ref 6.8–8.8)
SODIUM SERPL-SCNC: 139 MMOL/L (ref 133–144)
TRIGL SERPL-MCNC: 132 MG/DL
TSH SERPL DL<=0.005 MIU/L-ACNC: 1.13 MU/L (ref 0.4–4)

## 2019-10-04 ENCOUNTER — OFFICE VISIT (OUTPATIENT)
Dept: FAMILY MEDICINE | Facility: CLINIC | Age: 29
End: 2019-10-04
Payer: MEDICARE

## 2019-10-04 VITALS
WEIGHT: 192 LBS | HEIGHT: 69 IN | OXYGEN SATURATION: 100 % | SYSTOLIC BLOOD PRESSURE: 133 MMHG | BODY MASS INDEX: 28.44 KG/M2 | HEART RATE: 78 BPM | RESPIRATION RATE: 14 BRPM | DIASTOLIC BLOOD PRESSURE: 88 MMHG

## 2019-10-04 DIAGNOSIS — R10.9 FLANK PAIN: Primary | ICD-10-CM

## 2019-10-04 DIAGNOSIS — R79.89 ELEVATED SERUM CREATININE: ICD-10-CM

## 2019-10-04 PROCEDURE — 99214 OFFICE O/P EST MOD 30 MIN: CPT | Performed by: FAMILY MEDICINE

## 2019-10-04 ASSESSMENT — MIFFLIN-ST. JEOR: SCORE: 1828.35

## 2019-10-04 NOTE — PROGRESS NOTES
Subjective     Jerrod Solis is a 28 year old male who presents to clinic today for the following health issues:    HPI   Abdominal Pain- more of a flank pain , states it is worse when sitting for prolonged period of time and points towards flank areas, no radiating of the pain and usually pain goes away it is not constant. No fever no chills, no dysuria , no hematuria , no frequency no urgency . No constipation, no diarrhea no hx of any injuries . Just recently had labs requested by his psychiatrist and the creatinine was 1.32 at the time- he was fasting .      Duration:  1 month     Description (location/character/radiation): as above       Associated flank pain: yes    Intensity:  mild    Accompanying signs and symptoms:        Fever/Chills: no        Gas/Bloating: no        Nausea/vomitting: no        Diarrhea: no        Dysuria or Hematuria: no     History (previous similar pain/trauma/previous testing): no    Precipitating or alleviating factors:       Pain worse with eating/BM/urination: no, when eats feels a little better        Pain relieved by BM: no     Therapies tried and outcome: None    LMP:  not applicable        Patient Active Problem List   Diagnosis     Disturbance of conduct     Attention deficit hyperactivity disorder (ADHD)     Traumatic shock (H)     Alcohol affecting fetus or  via placenta or breast milk     Mood disorder in conditions classified elsewhere     Oppositional defiant disorder     CARDIOVASCULAR SCREENING; LDL GOAL LESS THAN 160     Hypothyroidism     Helicobacter positive gastritis     Hypothyroidism due to medication     Hypothyroidism, unspecified type     Gastroesophageal reflux disease without esophagitis     Elevated cholesterol with elevated triglycerides     No past surgical history on file.    Social History     Tobacco Use     Smoking status: Former Smoker     Smokeless tobacco: Never Used   Substance Use Topics     Alcohol use: No     Family History   Problem  Relation Age of Onset     Diabetes No family hx of      Coronary Artery Disease No family hx of      Hypertension No family hx of      Hyperlipidemia No family hx of      Cerebrovascular Disease No family hx of      Breast Cancer No family hx of      Colon Cancer No family hx of      Prostate Cancer No family hx of      Other Cancer No family hx of      Depression No family hx of      Anxiety Disorder No family hx of      Mental Illness No family hx of      Substance Abuse No family hx of      Anesthesia Reaction No family hx of      Asthma No family hx of      Osteoporosis No family hx of      Genetic Disorder No family hx of      Thyroid Disease No family hx of      Obesity No family hx of      Unknown/Adopted No family hx of          Current Outpatient Medications   Medication Sig Dispense Refill     ABILIFY 10 MG tablet Take 10 mg by mouth daily        buPROPion (WELLBUTRIN XL) 300 MG 24 hr tablet every morning       fish oil-omega-3 fatty acids 1000 MG capsule TAKE 1 G BY MOUTH 2 TIMES DAILY (WITH MEALS) 180 capsule 2     levothyroxine (SYNTHROID/LEVOTHROID) 50 MCG tablet TAKE 1 TABLET BY MOUTH EVERY MORNING 30 tablet 0     lithium (ESKALITH/LITHOBID) 300 MG CR tablet        OLANZapine (ZYPREXA) 2.5 MG tablet        OLANZapine (ZYPREXA) 5 MG tablet Take 5 mg by mouth At Bedtime Pt taking 1/2 tab ( 2.5 mg) daily at bedtime       omeprazole (PRILOSEC) 20 MG DR capsule TAKE 1 CAPSULE BY MOUTH DAILY (30-60 MINUTE(S) BEFORE A MEAL) 90 capsule 3     PREVIDENT 5000 ENAMEL PROTECT 1.1-5 % PSTE APPLY TO AFFECTED AREA TWICE A  mL 0     terbinafine (LAMISIL AT) 1 % cream Apply topically 2 times daily For fungal infection not resolved with other antifungals (e.g. Clotrimazole) 80 g 1     triamcinolone (KENALOG) 0.1 % cream Apply sparingly to affected area two times daily for 14 days. 15 g 0     Allergies   Allergen Reactions     Depakote [Valproic Acid]      Ibuprofen Hives     Pt on Lithium and CANNOT take  ibuprofen with this medication     Recent Labs   Lab Test 10/01/19  1316 07/25/18  1044 01/09/18  1116  11/13/17  1257  12/14/16  1335 10/19/16  1619 05/25/16  1145  10/14/15  1320   A1C  --   --  5.0  --   --   --   --   --  5.4  --  5.5   * Cannot estimate LDL when triglyceride exceeds 400 mg/dL  --   --   --   --  131*  --  131*   < >  --    HDL 55 47  --   --   --   --  72  --  69   < >  --    TRIG 132 641*  --   --   --   --  113  --  112   < >  --    ALT 23  --   --   --  35  --   --  33  --    < > 28   CR 1.32*  --   --   --  1.27*  --   --  1.14 1.15   < > 1.17   GFRESTIMATED 73  --   --   --  69  --   --  78 78   < > 77   GFRESTBLACK 85  --   --   --  83  --   --  >90   GFR Calc   >90   GFR Calc     < > >90   GFR Calc     POTASSIUM 4.0 4.0  --   --  3.8  --   --  4.1 4.1   < > 4.0   TSH 1.13 1.78  --    < >  --    < >  --  0.02* 2.04   < >  --     < > = values in this interval not displayed.      BP Readings from Last 3 Encounters:   10/04/19 133/88   01/16/19 144/82   11/27/18 128/81    Wt Readings from Last 3 Encounters:   10/04/19 87.1 kg (192 lb)   01/16/19 92.5 kg (204 lb)   11/27/18 87.3 kg (192 lb 8 oz)                      Reviewed and updated as needed this visit by Provider         Review of Systems   ROS COMP: Constitutional, HEENT, cardiovascular, pulmonary, GI, , musculoskeletal, neuro, skin, endocrine and psych systems are negative, except as otherwise noted.      Objective    There were no vitals taken for this visit.  There is no height or weight on file to calculate BMI.  Physical Exam   GENERAL: healthy, alert and no distress  EYES: Eyes grossly normal to inspection, PERRL and conjunctivae and sclerae normal  NECK: no adenopathy, no asymmetry, masses, or scars and thyroid normal to palpation  RESP: lungs clear to auscultation - no rales, rhonchi or wheezes  CV: regular rate and rhythm, normal S1 S2, no S3 or S4, no murmur, click or  rub, no peripheral edema and peripheral pulses strong  ABDOMEN: soft, nontender, no hepatosplenomegaly, no masses and bowel sounds normal  MS: no gross musculoskeletal defects noted, no edema    Diagnostic Test Results:  Labs reviewed in Epic  Results for orders placed or performed in visit on 10/01/19   TSH   Result Value Ref Range    TSH 1.13 0.40 - 4.00 mU/L   Lithium level   Result Value Ref Range    Lithium Level 0.92 0.60 - 1.20 mmol/L   Comprehensive metabolic panel (BMP + Alb, Alk Phos, ALT, AST, Total. Bili, TP)   Result Value Ref Range    Sodium 139 133 - 144 mmol/L    Potassium 4.0 3.4 - 5.3 mmol/L    Chloride 108 94 - 109 mmol/L    Carbon Dioxide 27 20 - 32 mmol/L    Anion Gap 4 3 - 14 mmol/L    Glucose 90 70 - 99 mg/dL    Urea Nitrogen 10 7 - 30 mg/dL    Creatinine 1.32 (H) 0.66 - 1.25 mg/dL    GFR Estimate 73 >60 mL/min/[1.73_m2]    GFR Estimate If Black 85 >60 mL/min/[1.73_m2]    Calcium 9.5 8.5 - 10.1 mg/dL    Bilirubin Total 0.6 0.2 - 1.3 mg/dL    Albumin 4.1 3.4 - 5.0 g/dL    Protein Total 7.6 6.8 - 8.8 g/dL    Alkaline Phosphatase 92 40 - 150 U/L    ALT 23 0 - 70 U/L    AST 13 0 - 45 U/L   Lipid panel reflex to direct LDL Fasting   Result Value Ref Range    Cholesterol 248 (H) <200 mg/dL    Triglycerides 132 <150 mg/dL    HDL Cholesterol 55 >39 mg/dL    LDL Cholesterol Calculated 167 (H) <100 mg/dL    Non HDL Cholesterol 193 (H) <130 mg/dL   CBC with platelets and differential   Result Value Ref Range    WBC 6.4 4.0 - 11.0 10e9/L    RBC Count 5.16 4.4 - 5.9 10e12/L    Hemoglobin 13.7 13.3 - 17.7 g/dL    Hematocrit 43.8 40.0 - 53.0 %    MCV 85 78 - 100 fl    MCH 26.6 26.5 - 33.0 pg    MCHC 31.3 (L) 31.5 - 36.5 g/dL    RDW 14.9 10.0 - 15.0 %    Platelet Count 165 150 - 450 10e9/L    % Neutrophils 37.6 %    % Lymphocytes 50.7 %    % Monocytes 6.4 %    % Eosinophils 4.5 %    % Basophils 0.8 %    Absolute Neutrophil 2.4 1.6 - 8.3 10e9/L    Absolute Lymphocytes 3.2 0.8 - 5.3 10e9/L    Absolute Monocytes  0.4 0.0 - 1.3 10e9/L    Absolute Eosinophils 0.3 0.0 - 0.7 10e9/L    Absolute Basophils 0.1 0.0 - 0.2 10e9/L    Diff Method Automated Method    UA reflex to Microscopic   Result Value Ref Range    Color Urine Yellow     Appearance Urine Clear     Glucose Urine Negative NEG^Negative mg/dL    Bilirubin Urine Negative NEG^Negative    Ketones Urine Negative NEG^Negative mg/dL    Specific Gravity Urine 1.015 1.003 - 1.035    Blood Urine Negative NEG^Negative    pH Urine 7.0 5.0 - 7.0 pH    Protein Albumin Urine Negative NEG^Negative mg/dL    Urobilinogen Urine 0.2 0.2 - 1.0 EU/dL    Nitrite Urine Negative NEG^Negative    Leukocyte Esterase Urine Negative NEG^Negative    Source Midstream Urine            Assessment & Plan     1. Elevated serum creatinine  We discussed the elevated creatinine with the pt and I would like him to come for recheck in 2to 3 weeks, also these labs would need to be forwarded to his psychiatrist . RTC if no improving or worsening.    - Basic metabolic panel  (Ca, Cl, CO2, Creat, Gluc, K, Na, BUN); Future       (R10.9) Flank pain  (primary encounter diagnosis)  Comment: looks like musculoskeletal in nature but will need to monitor his creatinine as he is on Lithium   Plan: as above       RTC if no improving or worsening.      Tatiana Juan MD  [facility]

## 2019-10-18 DIAGNOSIS — R79.89 ELEVATED SERUM CREATININE: ICD-10-CM

## 2019-10-18 DIAGNOSIS — E03.9 HYPOTHYROIDISM, UNSPECIFIED TYPE: ICD-10-CM

## 2019-10-18 LAB
ANION GAP SERPL CALCULATED.3IONS-SCNC: 6 MMOL/L (ref 3–14)
BUN SERPL-MCNC: 11 MG/DL (ref 7–30)
CALCIUM SERPL-MCNC: 9.4 MG/DL (ref 8.5–10.1)
CHLORIDE SERPL-SCNC: 106 MMOL/L (ref 94–109)
CO2 SERPL-SCNC: 25 MMOL/L (ref 20–32)
CREAT SERPL-MCNC: 1.2 MG/DL (ref 0.66–1.25)
GFR SERPL CREATININE-BSD FRML MDRD: 82 ML/MIN/{1.73_M2}
GLUCOSE SERPL-MCNC: 106 MG/DL (ref 70–99)
POTASSIUM SERPL-SCNC: 3.7 MMOL/L (ref 3.4–5.3)
SODIUM SERPL-SCNC: 137 MMOL/L (ref 133–144)
TSH SERPL DL<=0.005 MIU/L-ACNC: 1.05 MU/L (ref 0.4–4)

## 2019-10-18 PROCEDURE — 80048 BASIC METABOLIC PNL TOTAL CA: CPT | Performed by: FAMILY MEDICINE

## 2019-10-18 PROCEDURE — 36415 COLL VENOUS BLD VENIPUNCTURE: CPT | Performed by: FAMILY MEDICINE

## 2019-10-18 PROCEDURE — 84443 ASSAY THYROID STIM HORMONE: CPT | Performed by: FAMILY MEDICINE

## 2019-10-24 ENCOUNTER — TELEPHONE (OUTPATIENT)
Dept: FAMILY MEDICINE | Facility: CLINIC | Age: 29
End: 2019-10-24

## 2019-10-24 NOTE — TELEPHONE ENCOUNTER
Reason for Call:  Request for results:    Name of test or procedure: TSH and BMP    Date of test of procedure: 10/18/2019    Location of the test or procedure: [place]     OK to leave the result message on voice mail or with a family member? YES    Phone number Patient can be reached at:  Home number on file [phone] (home)    Additional comments: Pt is in a group home and a staff person might answer first     Call taken on 10/24/2019 at 10:38 AM by Nicole Corrales

## 2019-10-25 DIAGNOSIS — K21.9 GASTROESOPHAGEAL REFLUX DISEASE WITHOUT ESOPHAGITIS: ICD-10-CM

## 2019-10-28 NOTE — TELEPHONE ENCOUNTER
"Too soon.  Rx sent 12/4/18 for #90 with 3 refills.  Christin Bradley RN    Requested Prescriptions   Pending Prescriptions Disp Refills     omeprazole (PRILOSEC) 20 MG DR capsule [Pharmacy Med Name: OMEPRAZOLE 20MG CAP] 30 capsule      Sig: TAKE 1 CAPSULE BY MOUTH DAILY (30-60 MINUTE(S) BEFORE A MEAL)       PPI Protocol Passed - 10/25/2019  3:44 PM        Passed - Not on Clopidogrel (unless Pantoprazole ordered)        Passed - No diagnosis of osteoporosis on record        Passed - Recent (12 mo) or future (30 days) visit within the authorizing provider's specialty     Patient has had an office visit with the authorizing provider or a provider within the authorizing providers department within the previous 12 mos or has a future within next 30 days. See \"Patient Info\" tab in inbasket, or \"Choose Columns\" in Meds & Orders section of the refill encounter.              Passed - Medication is active on med list        Passed - Patient is age 18 or older        "

## 2019-10-29 DIAGNOSIS — K21.9 GASTROESOPHAGEAL REFLUX DISEASE WITHOUT ESOPHAGITIS: ICD-10-CM

## 2019-10-29 DIAGNOSIS — E03.9 HYPOTHYROIDISM, UNSPECIFIED TYPE: ICD-10-CM

## 2019-10-30 RX ORDER — LEVOTHYROXINE SODIUM 50 UG/1
TABLET ORAL
Qty: 30 TABLET | Refills: 0 | Status: SHIPPED | OUTPATIENT
Start: 2019-10-30 | End: 2019-11-20

## 2019-10-30 NOTE — TELEPHONE ENCOUNTER
Prescription approved per [facility] Refill Protocol.  Christin Bradley RN

## 2019-11-20 DIAGNOSIS — E78.2 ELEVATED CHOLESTEROL WITH ELEVATED TRIGLYCERIDES: ICD-10-CM

## 2019-11-20 DIAGNOSIS — K21.9 GASTROESOPHAGEAL REFLUX DISEASE WITHOUT ESOPHAGITIS: ICD-10-CM

## 2019-11-20 DIAGNOSIS — E03.9 HYPOTHYROIDISM, UNSPECIFIED TYPE: ICD-10-CM

## 2019-11-21 RX ORDER — LEVOTHYROXINE SODIUM 50 UG/1
TABLET ORAL
Qty: 90 TABLET | Refills: 0 | Status: SHIPPED | OUTPATIENT
Start: 2019-11-21 | End: 2020-02-04

## 2019-11-21 RX ORDER — CHLORAL HYDRATE 500 MG
CAPSULE ORAL
Qty: 180 CAPSULE | Refills: 1 | Status: SHIPPED | OUTPATIENT
Start: 2019-11-21 | End: 2020-02-04

## 2019-11-21 NOTE — TELEPHONE ENCOUNTER
"FISH OIL 1000MG CAP  Last Written Prescription Date:  03/05/2019  Last Fill Quantity: 180,  # refills: 2   Last office visit: 10/4/2019 with prescribing provider:  ROSALIO   Future Office Visit: 12/03 WITH   Next 5 appointments (look out 90 days)    Dec 03, 2019 10:20 AM CST  PHYSICAL with Tatiana Juan MD  [facility] ([facility]) [address] [address]  [phone]         OMEPRAZOLE 20MG CAP  Last Written Prescription Date:  10/3/2019  Last Fill Quantity: 30,  # refills: 0   Last office visit: 10/4/2019 with prescribing provider:  ROSALIO   Future Office Visit: 12/03 WITH   Next 5 appointments (look out 90 days)    Dec 03, 2019 10:20 AM CST  PHYSICAL with Tatiana Juan MD  [facility] ([facility]) [address] [address]  [phone]         LEVOTHYROXINE 50MCG TAB  Last Written Prescription Date:  10/30/2019  Last Fill Quantity: 30,  # refills: 0   Last office visit: 10/4/2019 with prescribing provider:  ROSALIO   Future Office Visit: 12/03 WITH   Next 5 appointments (look out 90 days)    Dec 03, 2019 10:20 AM CST  PHYSICAL with Tatiana Juan MD  [facility] ([facility]) [address] [address]  [phone]         Requested Prescriptions   Pending Prescriptions Disp Refills     fish oil-omega-3 fatty acids 1000 MG capsule [Pharmacy Med Name: FISH OIL 1000MG CAP] 90 capsule      Sig: TAKE 1 G BY MOUTH 2 TIMES DAILY (WITH MEALS)       Vitamin Supplements (Adult) Protocol Passed - 11/20/2019  3:59 PM        Passed - High dose Vitamin D not ordered        Passed - Recent (12 mo) or future (30 days) visit within the authorizing provider's specialty     Patient has had an office visit with the authorizing provider or a provider within the authorizing providers department within the previous 12 mos or has a future within next 30 days. See \"Patient Info\" tab in inbasket, " "or \"Choose Columns\" in Meds & Orders section of the refill encounter.              Passed - Medication is active on med list        omeprazole (PRILOSEC) 20 MG DR capsule [Pharmacy Med Name: OMEPRAZOLE 20MG CAP] 30 capsule 0     Sig: TAKE 1 CAPSULE BY MOUTH DAILY 30-60 MINUTES BEFORE A MEAL       PPI Protocol Passed - 11/20/2019  3:59 PM        Passed - Not on Clopidogrel (unless Pantoprazole ordered)        Passed - No diagnosis of osteoporosis on record        Passed - Recent (12 mo) or future (30 days) visit within the authorizing provider's specialty     Patient has had an office visit with the authorizing provider or a provider within the authorizing providers department within the previous 12 mos or has a future within next 30 days. See \"Patient Info\" tab in inThe [employer], or \"Choose Columns\" in Meds & Orders section of the refill encounter.              Passed - Medication is active on med list        Passed - Patient is age 18 or older        levothyroxine (SYNTHROID/LEVOTHROID) 50 MCG tablet [Pharmacy Med Name: LEVOTHYROXINE 50MCG TAB] 30 tablet 0     Sig: TAKE 1 TABLET BY MOUTH EVERY MORNING       Thyroid Protocol Passed - 11/20/2019  3:59 PM        Passed - Patient is 12 years or older        Passed - Recent (12 mo) or future (30 days) visit within the authorizing provider's specialty     Patient has had an office visit with the authorizing provider or a provider within the authorizing providers department within the previous 12 mos or has a future within next 30 days. See \"Patient Info\" tab in inAteoet, or \"Choose Columns\" in Meds & Orders section of the refill encounter.              Passed - Medication is active on med list        Passed - Normal TSH on file in past 12 months     Recent Labs   Lab Test 10/18/19  1300   TSH 1.05              "

## 2019-11-21 NOTE — TELEPHONE ENCOUNTER
Omeprazole:  Prescription approved per [facility] Refill Protocol.    Levothyroxine:  Prescription approved per [facility] Refill Protocol.    Fish Oil:  Won't eRx   Needs to be printed, signed, and hand faxed    Colleen LOPES RN

## 2019-11-25 DIAGNOSIS — E78.2 ELEVATED CHOLESTEROL WITH ELEVATED TRIGLYCERIDES: ICD-10-CM

## 2019-11-26 RX ORDER — CHLORAL HYDRATE 500 MG
CAPSULE ORAL
Start: 2019-11-26

## 2019-11-26 NOTE — TELEPHONE ENCOUNTER
"Fish Oil 1000 MG   Last Written Prescription Date:  11/21/2019  Last Fill Quantity: 180,  # refills: 1   Last office visit: 10/4/2019 with prescribing provider:  Yes    Future Office Visit:   Next 5 appointments (look out 90 days)    Dec 03, 2019 10:20 AM CST  PHYSICAL with Tatiana Juan MD  [facility] ([facility]) [address] [address] [phone]           Requested Prescriptions   Pending Prescriptions Disp Refills     fish oil-omega-3 fatty acids 1000 MG capsule [Pharmacy Med Name: FISH OIL 1000MG CAP] 90 capsule      Sig: TAKE 1 G BY MOUTH 2 TIMES DAILY (WITH MEALS)       Vitamin Supplements (Adult) Protocol Passed - 11/25/2019 11:32 AM        Passed - High dose Vitamin D not ordered        Passed - Recent (12 mo) or future (30 days) visit within the authorizing provider's specialty     Patient has had an office visit with the authorizing provider or a provider within the authorizing providers department within the previous 12 mos or has a future within next 30 days. See \"Patient Info\" tab in inbasket, or \"Choose Columns\" in Meds & Orders section of the refill encounter.              Passed - Medication is active on med list          "

## 2019-12-11 ENCOUNTER — OFFICE VISIT (OUTPATIENT)
Dept: FAMILY MEDICINE | Facility: CLINIC | Age: 29
End: 2019-12-11
Payer: MEDICARE

## 2019-12-11 ENCOUNTER — MEDICAL CORRESPONDENCE (OUTPATIENT)
Dept: HEALTH INFORMATION MANAGEMENT | Facility: CLINIC | Age: 29
End: 2019-12-11

## 2019-12-11 VITALS
TEMPERATURE: 98.1 F | SYSTOLIC BLOOD PRESSURE: 120 MMHG | BODY MASS INDEX: 29.18 KG/M2 | OXYGEN SATURATION: 98 % | WEIGHT: 197 LBS | HEART RATE: 77 BPM | HEIGHT: 69 IN | DIASTOLIC BLOOD PRESSURE: 88 MMHG

## 2019-12-11 DIAGNOSIS — Z00.00 ENCOUNTER FOR ROUTINE ADULT HEALTH EXAMINATION WITHOUT ABNORMAL FINDINGS: Primary | ICD-10-CM

## 2019-12-11 DIAGNOSIS — R73.01 ELEVATED FASTING GLUCOSE: ICD-10-CM

## 2019-12-11 LAB — HBA1C MFR BLD: 5.5 % (ref 0–5.6)

## 2019-12-11 PROCEDURE — G0438 PPPS, INITIAL VISIT: HCPCS | Performed by: FAMILY MEDICINE

## 2019-12-11 PROCEDURE — 83036 HEMOGLOBIN GLYCOSYLATED A1C: CPT | Performed by: FAMILY MEDICINE

## 2019-12-11 PROCEDURE — 36415 COLL VENOUS BLD VENIPUNCTURE: CPT | Performed by: FAMILY MEDICINE

## 2019-12-11 ASSESSMENT — MIFFLIN-ST. JEOR: SCORE: 1846.03

## 2019-12-11 ASSESSMENT — ACTIVITIES OF DAILY LIVING (ADL): CURRENT_FUNCTION: NO ASSISTANCE NEEDED

## 2019-12-11 NOTE — PATIENT INSTRUCTIONS
Patient Education   Personalized Prevention Plan  You are due for the preventive services outlined below.  Your care team is available to assist you in scheduling these services.  If you have already completed any of these items, please share that information with your care team to update in your medical record.  Health Maintenance Due   Topic Date Due     Flu Vaccine (1) 09/01/2019     Annual Wellness Visit  11/27/2019

## 2019-12-11 NOTE — PROGRESS NOTES
SUBJECTIVE:   Jerrod Solis is a 29 year old male who presents for Preventive Visit.      Are you in the first 12 months of your Medicare coverage?  No    Healthy Habits:    In general, how would you rate your overall health?  Very good    Frequency of exercise:  2-3 days/week    Duration of exercise:  45-60 minutes    Taking medications regularly:  Yes    Barriers to taking medications:  Not applicable    Medication side effects:  Not applicable    Ability to successfully perform activities of daily living:  No assistance needed    Home Safety:  No safety concerns identified    Hearing Impairment:  No hearing concerns    In the past 6 months, have you been bothered by leaking of urine?  No    In general, how would you rate your overall mental or emotional health?  Good      PHQ-2 Total Score:    Additional concerns today:  No    Do you feel safe in your environment? Yes    Have you ever done Advance Care Planning? (For example, a Health Directive, POLST, or a discussion with a medical provider or your loved ones about your wishes): Yes, advance care planning is on file.      Fall risk       Cognitive Screening Not appropriate due to mental handicap    Do you have sleep apnea, excessive snoring or daytime drowsiness?: no    Reviewed and updated as needed this visit by clinical staff  Tobacco  Allergies  Meds  Problems  Med Hx  Surg Hx  Fam Hx         Reviewed and updated as needed this visit by Provider        Social History     Tobacco Use     Smoking status: Former Smoker     Smokeless tobacco: Never Used   Substance Use Topics     Alcohol use: No         Alcohol Use 11/27/2018   Prescreen: >3 drinks/day or >7 drinks/week? Not Applicable   Prescreen: >3 drinks/day or >7 drinks/week? -               Current providers sharing in care for this patient include:   Patient Care Team:  Tatiana Juan MD as PCP - General  Tatiana Juan MD as Assigned PCP  Jermaine Lawler MD as MD (Clinical  Neurophysiology)    The following health maintenance items are reviewed in Epic and correct as of today:  Health Maintenance   Topic Date Due     INFLUENZA VACCINE (1) 2019     MEDICARE ANNUAL WELLNESS VISIT  2019     DTAP/TDAP/TD IMMUNIZATION (8 - Td) 2021     HIV SCREENING  Completed     IPV IMMUNIZATION  Completed     MENINGITIS IMMUNIZATION  Aged Out     Labs reviewed in EPIC  BP Readings from Last 3 Encounters:   19 120/88   10/04/19 133/88   19 144/82    Wt Readings from Last 3 Encounters:   19 89.4 kg (197 lb)   10/04/19 87.1 kg (192 lb)   19 92.5 kg (204 lb)                  Patient Active Problem List   Diagnosis     Disturbance of conduct     Attention deficit hyperactivity disorder (ADHD)     Traumatic shock (H)     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 No pertinent surgical history.    Social History     Tobacco Use     Smoking status: Former Smoker     Smokeless tobacco: Never Used   Substance Use Topics     Alcohol use: No     Family History   Problem Relation Age of Onset     Diabetes No family hx of      Coronary Artery Disease No family hx of      Hypertension No family hx of      Hyperlipidemia No family hx of      Cerebrovascular Disease No family hx of      Breast Cancer No family hx of      Colon Cancer No family hx of      Prostate Cancer No family hx of      Other Cancer No family hx of      Depression No family hx of      Anxiety Disorder No family hx of      Mental Illness No family hx of      Substance Abuse No family hx of       Anesthesia Reaction No family hx of      Asthma No family hx of      Osteoporosis No family hx of      Genetic Disorder No family hx of      Thyroid Disease No family hx of      Obesity No family hx of      Unknown/Adopted No family hx of          Current Outpatient Medications   Medication Sig Dispense Refill     ABILIFY 10 MG tablet Take 10 mg by mouth daily        buPROPion (WELLBUTRIN XL) 300 MG 24 hr tablet every morning       fish oil-omega-3 fatty acids 1000 MG capsule TAKE 1 G BY MOUTH 2 TIMES DAILY (WITH MEALS) 180 capsule 1     levothyroxine (SYNTHROID/LEVOTHROID) 50 MCG tablet TAKE 1 TABLET BY MOUTH EVERY MORNING 90 tablet 0     lithium (ESKALITH/LITHOBID) 300 MG CR tablet        OLANZapine (ZYPREXA) 2.5 MG tablet        OLANZapine (ZYPREXA) 5 MG tablet Take 5 mg by mouth At Bedtime Pt taking 1/2 tab ( 2.5 mg) daily at bedtime       omeprazole (PRILOSEC) 20 MG DR capsule TAKE 1 CAPSULE BY MOUTH DAILY 30-60 MINUTES BEFORE A MEAL 90 capsule 1     PREVIDENT 5000 ENAMEL PROTECT 1.1-5 % PSTE APPLY TO AFFECTED AREA TWICE A  mL 0     terbinafine (LAMISIL AT) 1 % cream Apply topically 2 times daily For fungal infection not resolved with other antifungals (e.g. Clotrimazole) 80 g 1     triamcinolone (KENALOG) 0.1 % cream Apply sparingly to affected area two times daily for 14 days. 15 g 0     Allergies   Allergen Reactions     Depakote [Valproic Acid]      Ibuprofen Hives     Pt on Lithium and CANNOT take ibuprofen with this medication     Recent Labs   Lab Test 12/11/19  1458 10/18/19  1300 10/01/19  1316 07/25/18  1044 01/09/18  1116  11/13/17  1257  12/14/16  1335 10/19/16  1619 05/25/16  1145   A1C 5.5  --   --   --  5.0  --   --   --   --   --  5.4   LDL  --   --  167* Cannot estimate LDL when triglyceride exceeds 400 mg/dL  --   --   --   --  131*  --  131*   HDL  --   --  55 47  --   --   --   --  72  --  69   TRIG  --   --  132 641*  --   --   --   --  113  --  112   ALT  --   --  23  --   --   --   "35  --   --  33  --    CR  --  1.20 1.32*  --   --   --  1.27*  --   --  1.14 1.15   GFRESTIMATED  --  82 73  --   --   --  69  --   --  78 78   GFRESTBLACK  --  >90 85  --   --   --  83  --   --  >90   GFR Calc   >90   GFR Calc     POTASSIUM  --  3.7 4.0 4.0  --   --  3.8  --   --  4.1 4.1   TSH  --  1.05 1.13 1.78  --    < >  --    < >  --  0.02* 2.04    < > = values in this interval not displayed.          Review of Systems  Constitutional, HEENT, cardiovascular, pulmonary, GI, , musculoskeletal, neuro, skin, endocrine and psych systems are negative, except as otherwise noted.    OBJECTIVE:   BP (!) 149/90   Pulse 77   Temp 98.1  F (36.7  C) (Oral)   Ht 1.74 m (5' 8.5\")   Wt 89.4 kg (197 lb)   SpO2 98%   BMI 29.52 kg/m   Estimated body mass index is 29.52 kg/m  as calculated from the following:    Height as of this encounter: 1.74 m (5' 8.5\").    Weight as of this encounter: 89.4 kg (197 lb).  Physical Exam  GENERAL: healthy, alert and no distress  EYES: Eyes grossly normal to inspection, PERRL and conjunctivae and sclerae normal  HENT: ear canals and TM's normal, nose and mouth without ulcers or lesions  NECK: no adenopathy, no asymmetry, masses, or scars and thyroid normal to palpation  RESP: lungs clear to auscultation - no rales, rhonchi or wheezes  CV: regular rate and rhythm, normal S1 S2, no S3 or S4, no murmur, click or rub, no peripheral edema and peripheral pulses strong  ABDOMEN: soft, nontender, no hepatosplenomegaly, no masses and bowel sounds normal  MS: no gross musculoskeletal defects noted, no edema  SKIN: no suspicious lesions or rashes  NEURO: Normal strength and tone, mentation intact and speech normal  PSYCH: mentation appears normal, affect normal/bright    Diagnostic Test Results:  Labs reviewed in Epic  Results for orders placed or performed in visit on 12/11/19   Hemoglobin A1c     Status: None   Result Value Ref Range    Hemoglobin A1C 5.5 0 - 5.6 " "%       ASSESSMENT / PLAN:   1. Encounter for routine adult health examination without abnormal findings  Will check lipids today , has done other labs through his psychiatrist .  - Lipid Profile (Chol, Trig, HDL, LDL calc); Future    2. Elevated fasting glucose  He has had elevated glucose   - Hemoglobin A1c    COUNSELING:  Reviewed preventive health counseling, as reflected in patient instructions       Regular exercise       Healthy diet/nutrition       Vision screening    Estimated body mass index is 29.52 kg/m  as calculated from the following:    Height as of this encounter: 1.74 m (5' 8.5\").    Weight as of this encounter: 89.4 kg (197 lb).         reports that he has quit smoking. He has never used smokeless tobacco.      Appropriate preventive services were discussed with this patient, including applicable screening as appropriate for cardiovascular disease, diabetes, osteopenia/osteoporosis, and glaucoma.  As appropriate for age/gender, discussed screening for colorectal cancer, prostate cancer, breast cancer, and cervical cancer. Checklist reviewing preventive services available has been given to the patient.    Reviewed patients plan of care and provided an AVS. T  Counseling Resources:  ATP IV Guidelines  Pooled Cohorts Equation Calculator  Breast Cancer Risk Calculator  FRAX Risk Assessment  ICSI Preventive Guidelines  Dietary Guidelines for Americans, 2010  [employer]'s MyPlate  ASA Prophylaxis  Lung CA Screening    Tatiana Juan MD  [facility]    Identified Health Risks:  "

## 2019-12-13 DIAGNOSIS — K08.89 TOOTHACHE: ICD-10-CM

## 2019-12-13 NOTE — TELEPHONE ENCOUNTER
Routing refill request to provider for review/approval because:  Med not on protocol.  Please authorize if appropriate.  Thanks,  Mary Lebron RN

## 2019-12-13 NOTE — TELEPHONE ENCOUNTER
PRVDNT 5000 ENAML NC PST  Last Written Prescription Date:  09/24/2019  Last Fill Quantity: 100ml,  # refills: 0   Last office visit: 12/11/2019 with prescribing provider:  ROSALIO   Future Office Visit:Nothing at this time scheduled.      Requested Prescriptions   Pending Prescriptions Disp Refills     PREVIDENT 5000 ENAMEL PROTECT 1.1-5 % PSTE [Pharmacy Med Name: PRVDNT 5000 ENAML NC PST] 100 mL 0     Sig: APPLY TO AFFECTED AREA TWICE A DAY       There is no refill protocol information for this order

## 2019-12-17 ENCOUNTER — TELEPHONE (OUTPATIENT)
Dept: FAMILY MEDICINE | Facility: CLINIC | Age: 29
End: 2019-12-17

## 2019-12-17 NOTE — TELEPHONE ENCOUNTER
Received form(s) from [facility] for Annual Physical Form.  Placed form(s) in/on 's desk.  Forms need to be signed and faxed to [phone]..    Call pt to verify form was sent: No  Copy needs to be sent for scanning after completion: Yes

## 2020-02-04 DIAGNOSIS — E03.9 HYPOTHYROIDISM, UNSPECIFIED TYPE: ICD-10-CM

## 2020-02-04 DIAGNOSIS — K08.89 TOOTHACHE: ICD-10-CM

## 2020-02-04 DIAGNOSIS — K21.9 GASTROESOPHAGEAL REFLUX DISEASE WITHOUT ESOPHAGITIS: ICD-10-CM

## 2020-02-04 DIAGNOSIS — E78.2 ELEVATED CHOLESTEROL WITH ELEVATED TRIGLYCERIDES: ICD-10-CM

## 2020-02-04 NOTE — TELEPHONE ENCOUNTER
Reason for Call:  Medication or medication refill:    Do you use a [facility]?  Name of the pharmacy and phone number for the current request:      [facility] [address], [place] [address]    Name of the medication requested: PREVIDENT 5000 ENAMEL PROTECT 1.1-5 %   fish oil-omega-3 fatty acids 1000 MG capsule   levothyroxine (SYNTHROID/LEVOTHROID) 50 MCG tablet   omeprazole (PRILOSEC) 20 MG DR capsule       Other request: Hiren from [facility] requesting above medication be sent to [facility] [place]    Can we leave a detailed message on this number? YES    Phone number Hiren can be reached at: [phone]    Best Time: Any    Call taken on 2/4/2020 at 11:14 AM by Radha Hutchins

## 2020-02-04 NOTE — TELEPHONE ENCOUNTER
"fish oil-omega-3 fatty acids 1000 MG capsule  Last Written Prescription Date:  11/21/2019  Last Fill Quantity: 180,  # refills: 1   Last office visit: No previous visit found with prescribing provider:  ROSALIO   Future Office Visit:  Nothing at this time scheduled.      levothyroxine (SYNTHROID/LEVOTHROID) 50 MCG tablet  Last Written Prescription Date:  11/21/2019  Last Fill Quantity: 90,  # refills: 0   Last office visit: No previous visit found with prescribing provider:  ROSALIO   Future Office Visit:  Nothing at this time scheduled.      omeprazole (PRILOSEC) 20 MG DR capsule  Last Written Prescription Date:  11/21/2019  Last Fill Quantity: 90,  # refills: 1   Last office visit: No previous visit found with prescribing provider:  ROSALIO   Future Office Visit: Nothing at this time scheduled.       Sod Fluoride-Potassium Nitrate (PREVIDENT 5000 ENAMEL PROTECT) 1.1-5 % PSTE  Last Written Prescription Date:  12/13/2019  Last Fill Quantity: 100ML,  # refills: 0   Last office visit: No previous visit found with prescribing provider:  ROSALIO   Future Office Visit:  Nothing at this time scheduled.    Requested Prescriptions   Pending Prescriptions Disp Refills     fish oil-omega-3 fatty acids 1000 MG capsule 180 capsule 0     Sig: TAKE 1 G BY MOUTH 2 TIMES DAILY (WITH MEALS)       Vitamin Supplements (Adult) Protocol Passed - 2/4/2020 11:29 AM        Passed - High dose Vitamin D not ordered        Passed - Recent (12 mo) or future (30 days) visit within the authorizing provider's specialty     Patient has had an office visit with the authorizing provider or a provider within the authorizing providers department within the previous 12 mos or has a future within next 30 days. See \"Patient Info\" tab in inbasket, or \"Choose Columns\" in Meds & Orders section of the refill encounter.              Passed - Medication is active on med list        levothyroxine (SYNTHROID/LEVOTHROID) 50 MCG tablet 90 tablet 0     Sig: Take 1 tablet (50 mcg) by " "mouth every morning       Thyroid Protocol Passed - 2/4/2020 11:29 AM        Passed - Patient is 12 years or older        Passed - Recent (12 mo) or future (30 days) visit within the authorizing provider's specialty     Patient has had an office visit with the authorizing provider or a provider within the authorizing providers department within the previous 12 mos or has a future within next 30 days. See \"Patient Info\" tab in inbasket, or \"Choose Columns\" in Meds & Orders section of the refill encounter.              Passed - Medication is active on med list        Passed - Normal TSH on file in past 12 months     Recent Labs   Lab Test 10/18/19  1300   TSH 1.05              omeprazole (PRILOSEC) 20 MG DR capsule 90 capsule 0     Sig: TAKE 1 CAPSULE BY MOUTH DAILY 30-60 MINUTES BEFORE A MEAL       PPI Protocol Passed - 2/4/2020 11:29 AM        Passed - Not on Clopidogrel (unless Pantoprazole ordered)        Passed - No diagnosis of osteoporosis on record        Passed - Recent (12 mo) or future (30 days) visit within the authorizing provider's specialty     Patient has had an office visit with the authorizing provider or a provider within the authorizing providers department within the previous 12 mos or has a future within next 30 days. See \"Patient Info\" tab in inbasket, or \"Choose Columns\" in Meds & Orders section of the refill encounter.              Passed - Medication is active on med list        Passed - Patient is age 18 or older        Sod Fluoride-Potassium Nitrate (PREVIDENT 5000 ENAMEL PROTECT) 1.1-5 % PSTE 100 mL 0     Sig: APPLY TO AFFECTED AREA TWICE A DAY       There is no refill protocol information for this order        "

## 2020-02-05 RX ORDER — CHLORAL HYDRATE 500 MG
CAPSULE ORAL
Qty: 180 CAPSULE | Refills: 2 | Status: SHIPPED | OUTPATIENT
Start: 2020-02-05 | End: 2021-01-20

## 2020-02-05 RX ORDER — LEVOTHYROXINE SODIUM 50 UG/1
50 TABLET ORAL EVERY MORNING
Qty: 90 TABLET | Refills: 2 | Status: SHIPPED | OUTPATIENT
Start: 2020-02-05 | End: 2020-03-05

## 2020-02-14 DIAGNOSIS — E03.9 HYPOTHYROIDISM, UNSPECIFIED TYPE: ICD-10-CM

## 2020-02-14 RX ORDER — LEVOTHYROXINE SODIUM 50 UG/1
TABLET ORAL
Start: 2020-02-14

## 2020-02-14 NOTE — TELEPHONE ENCOUNTER
"Derrek requesting Rx  Denied  See TE from 2/4/2020  Rx sent to [facility] per request 2/5/2020  Colleen LOPES RN    Last Written Prescription Date:  2/5/2020  Last Fill Quantity: 90,  # refills: 2   Last office visit: No previous visit found with prescribing provider:     Future Office Visit:    Requested Prescriptions   Pending Prescriptions Disp Refills     levothyroxine (SYNTHROID/LEVOTHROID) 50 MCG tablet [Pharmacy Med Name: Levothyroxine Sodium 50 MCG Tablet]  5     Sig: TAKE 1 TABLET BY MOUTH EVERY MORNING *1 TOTAL FILL*       Thyroid Protocol Passed - 2/14/2020 12:52 PM        Passed - Patient is 12 years or older        Passed - Recent (12 mo) or future (30 days) visit within the authorizing provider's specialty     Patient has had an office visit with the authorizing provider or a provider within the authorizing providers department within the previous 12 mos or has a future within next 30 days. See \"Patient Info\" tab in inbasket, or \"Choose Columns\" in Meds & Orders section of the refill encounter.              Passed - Medication is active on med list        Passed - Normal TSH on file in past 12 months     Recent Labs   Lab Test 10/18/19  1300   TSH 1.05              " Received report on pt.from off going RN. Resting quietly in bed on rounds. Denies c/o pain or SOB at this time. No acute distress noted. Call bell at side. Will cont to monitor for any changes in status. 1000 Bedside and Verbal shift change report given to [employer] (oncoming nurse) by Jose Antonio Murray RN (offgoing nurse). Report given with Nino BLAND and MAR.

## 2020-02-16 ENCOUNTER — HEALTH MAINTENANCE LETTER (OUTPATIENT)
Age: 30
End: 2020-02-16

## 2020-03-05 ENCOUNTER — TELEPHONE (OUTPATIENT)
Dept: FAMILY MEDICINE | Facility: CLINIC | Age: 30
End: 2020-03-05

## 2020-03-05 DIAGNOSIS — E03.9 HYPOTHYROIDISM, UNSPECIFIED TYPE: ICD-10-CM

## 2020-03-05 RX ORDER — LEVOTHYROXINE SODIUM 50 UG/1
50 TABLET ORAL EVERY MORNING
Qty: 90 TABLET | Refills: 2 | Status: SHIPPED | OUTPATIENT
Start: 2020-03-05 | End: 2021-02-09

## 2020-03-05 NOTE — TELEPHONE ENCOUNTER
Reason for Call:  Medication or medication refill: levothyroxine (SYNTHROID/LEVOTHROID) 50 MCG tablet    Do you use a [facility]?  Name of the pharmacy and phone number for the current request:         [email or web address] - [facility] [address]      Name of the medication requested: levothyroxine (SYNTHROID/LEVOTHROID) 50 MCG tablet    Other request: Kabs called from group home requesting to refill this medication until pt can be seen.     Can we leave a detailed message on this number? YES    Phone number patient can be reached at: Other phone number:  *[phone]    Best Time: Anytime     Call taken on 3/5/2020 at 2:02 PM by Kayla Jett

## 2020-03-05 NOTE — TELEPHONE ENCOUNTER
Rx sent to [facility] 2/5/2020 for #90 with 2 refills.  Spoke with Yoel from [facility].    [facility] is patient's pharmacy.  Rx sent to [facility].    Christin Bradley RN

## 2020-04-07 ENCOUNTER — MEDICAL CORRESPONDENCE (OUTPATIENT)
Dept: HEALTH INFORMATION MANAGEMENT | Facility: CLINIC | Age: 30
End: 2020-04-07

## 2020-05-21 ENCOUNTER — TELEPHONE (OUTPATIENT)
Dept: FAMILY MEDICINE | Facility: CLINIC | Age: 30
End: 2020-05-21
Payer: MEDICARE

## 2020-05-21 NOTE — TELEPHONE ENCOUNTER
Received form(s) from [employer] for drug therepy costs.  Placed form(s) LS desk.  Forms need to be read and medication changed.    Call pt to verify form was sent: no  Copy needs to be sent for scanning after completion: no

## 2020-07-01 ENCOUNTER — TELEPHONE (OUTPATIENT)
Dept: FAMILY MEDICINE | Facility: CLINIC | Age: 30
End: 2020-07-01

## 2020-07-01 NOTE — TELEPHONE ENCOUNTER
Received form(s) from [facility] for update prescriber orders.  Placed form(s) JF desk.  Forms need to be signed and faxed to [phone].    Call pt to verify form was sent: no  Copy needs to be sent for scanning after completion: no

## 2020-07-02 NOTE — TELEPHONE ENCOUNTER
Form(s) have been faxed.  Faxed or mailed to: number listed on form.  Was a copy sent to scanning?   yes sent to scanning on [place] side    Eda Jo MA  Medical Assistant  [facility]

## 2020-08-10 ENCOUNTER — MEDICAL CORRESPONDENCE (OUTPATIENT)
Dept: HEALTH INFORMATION MANAGEMENT | Facility: CLINIC | Age: 30
End: 2020-08-10

## 2020-08-11 ENCOUNTER — TELEPHONE (OUTPATIENT)
Dept: FAMILY MEDICINE | Facility: CLINIC | Age: 30
End: 2020-08-11

## 2020-08-11 DIAGNOSIS — F34.1 CHRONIC DEPRESSIVE PERSONALITY DISORDER: ICD-10-CM

## 2020-08-11 DIAGNOSIS — Z79.899 OTHER LONG TERM (CURRENT) DRUG THERAPY: ICD-10-CM

## 2020-08-11 DIAGNOSIS — F34.1 DYSTHYMIC DISORDER: Primary | ICD-10-CM

## 2020-08-11 NOTE — TELEPHONE ENCOUNTER
Called Erik and informed her that the provider requesting labs would need to send orders to them and then they can schedule appointment as lab only appointment. They will need to get orders from [facility] and then call our clinic to schedule lab jose.    Erik will get the orders and call back to schedule appointment

## 2020-08-11 NOTE — TELEPHONE ENCOUNTER
Reason for Call:  Other labs    Detailed comments: [facility].  Pt lives in supportive living, she is .  Per Erik, the patient's dad said patient is supposed to have labs checked, for psych meds and she wanted lab only appt.  The only lab order is lipids, previous lithium level was another provider order.   Does Dr Juan order labs for psych meds.     Phone Number : Other phone number:  [phone] # for Erik    Best Time:     Can we leave a detailed message on this number? YES    Call taken on 8/11/2020 at 10:34 AM by Josie Verduzco

## 2020-09-29 ENCOUNTER — TELEPHONE (OUTPATIENT)
Dept: FAMILY MEDICINE | Facility: CLINIC | Age: 30
End: 2020-09-29

## 2020-09-29 DIAGNOSIS — F41.9 ANXIETY: Primary | ICD-10-CM

## 2020-09-29 NOTE — TELEPHONE ENCOUNTER
"LS,    Patient's father Lola called.  Called asking for referral for psychiatrist in [place] system    Currently seeing a \"retail psychiatrist\" who has cancelled last 3 visit with patient, even virtual.    Patient needing blood drawn at time and currently psychiatrist no in   "[employer] system and he would like provider in [place] so you are able to keep update on patients progress.    Thanks,  Christin Bradley RN          "

## 2020-09-29 NOTE — TELEPHONE ENCOUNTER
Reason for Call: Request for an order or referral:    Order or referral being requested: Psychiatrist     Date needed: as soon as possible    Has the patient been seen by the PCP for this problem? YES    Additional comments: Patient's father, Lola called asking if Dr. Juan can send a referral over to a new Psychiatrist and if there was anyone in particular she would recommend for Jerrod.  Please call Lola once this is completed or if there's any further questions.      Phone number Patient can be reached at:  Other phone number:  Lola (father) [phone]    Best Time:  Anytime     Can we leave a detailed message on this number?  YES    Call taken on 9/29/2020 at 10:43 AM by Yas Delacruz MA

## 2020-09-29 NOTE — TELEPHONE ENCOUNTER
I have placed the referral for psychiatry in his chart - can you give his dad the referral ?  Thanks

## 2020-09-29 NOTE — TELEPHONE ENCOUNTER
Patient's father informed, gave him phone number in case needed but aware someone should call him/patient to schedule.    Christin Bradley RN

## 2020-10-14 ENCOUNTER — TELEPHONE (OUTPATIENT)
Dept: PSYCHIATRY | Facility: CLINIC | Age: 30
End: 2020-10-14
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
## 2020-11-05 ENCOUNTER — TELEPHONE (OUTPATIENT)
Dept: FAMILY MEDICINE | Facility: CLINIC | Age: 30
End: 2020-11-05

## 2020-11-05 NOTE — TELEPHONE ENCOUNTER
Reason for Call:  Other prescription    Detailed comments: Pt has not been taking several of his medications for a few months because he doesn't liek the way they make him feel. This includes his thyroid and proton pump inhibitors. He spoke with Erik Carmona to [facility] and stated he would start taking them again    Phone Number Erik Carmona [facility] can be reached at: [phone]    Best Time: prior to 2pm today 11.05.2020 or afternoon of 11.06.2020    Can we leave a detailed message on this number? YES    Call taken on 11/5/2020 at 10:57 AM by Leatha Jackson

## 2020-11-05 NOTE — TELEPHONE ENCOUNTER
LS,    Please see message below.  Do you want to see patient?  Do a virtual visit?    Last OV 12/11/2019 - physical.    Thanks,  Christin Bradley RN

## 2020-11-22 ENCOUNTER — HEALTH MAINTENANCE LETTER (OUTPATIENT)
Age: 30
End: 2020-11-22

## 2020-12-21 ENCOUNTER — DOCUMENTATION ONLY (OUTPATIENT)
Dept: OTHER | Facility: CLINIC | Age: 30
End: 2020-12-21

## 2021-01-01 ENCOUNTER — MYC MEDICAL ADVICE (OUTPATIENT)
Dept: FAMILY MEDICINE | Facility: CLINIC | Age: 31
End: 2021-01-01

## 2021-01-06 NOTE — TELEPHONE ENCOUNTER
Can you let Lola know that I would like to see Jerrod at  his appointment on the 8th but if he does not show up , I will still refill his med which are synthroid and omeprazole !  All the other meds are prescribed by psychiatry and not me , so those would need to be filled by his psychiatrist .Should definitely keep the appointment with psychiatry .  We can discuss his knee and eyes pain .  Thanks

## 2021-01-12 ENCOUNTER — OFFICE VISIT (OUTPATIENT)
Dept: FAMILY MEDICINE | Facility: CLINIC | Age: 31
End: 2021-01-12
Payer: MEDICARE

## 2021-01-12 VITALS
DIASTOLIC BLOOD PRESSURE: 85 MMHG | TEMPERATURE: 98.7 F | HEART RATE: 92 BPM | HEIGHT: 69 IN | OXYGEN SATURATION: 98 % | WEIGHT: 186.7 LBS | SYSTOLIC BLOOD PRESSURE: 127 MMHG | BODY MASS INDEX: 27.65 KG/M2

## 2021-01-12 DIAGNOSIS — E03.9 HYPOTHYROIDISM, UNSPECIFIED TYPE: Primary | ICD-10-CM

## 2021-01-12 DIAGNOSIS — K21.9 GASTROESOPHAGEAL REFLUX DISEASE WITHOUT ESOPHAGITIS: ICD-10-CM

## 2021-01-12 PROCEDURE — 99213 OFFICE O/P EST LOW 20 MIN: CPT | Performed by: PHYSICIAN ASSISTANT

## 2021-01-12 ASSESSMENT — PATIENT HEALTH QUESTIONNAIRE - PHQ9: SUM OF ALL RESPONSES TO PHQ QUESTIONS 1-9: 0

## 2021-01-12 ASSESSMENT — ANXIETY QUESTIONNAIRES
7. FEELING AFRAID AS IF SOMETHING AWFUL MIGHT HAPPEN: NOT AT ALL
GAD7 TOTAL SCORE: 0
5. BEING SO RESTLESS THAT IT IS HARD TO SIT STILL: NOT AT ALL
4. TROUBLE RELAXING: NOT AT ALL
2. NOT BEING ABLE TO STOP OR CONTROL WORRYING: NOT AT ALL
IF YOU CHECKED OFF ANY PROBLEMS ON THIS QUESTIONNAIRE, HOW DIFFICULT HAVE THESE PROBLEMS MADE IT FOR YOU TO DO YOUR WORK, TAKE CARE OF THINGS AT HOME, OR GET ALONG WITH OTHER PEOPLE: NOT DIFFICULT AT ALL
3. WORRYING TOO MUCH ABOUT DIFFERENT THINGS: NOT AT ALL
6. BECOMING EASILY ANNOYED OR IRRITABLE: NOT AT ALL
1. FEELING NERVOUS, ANXIOUS, OR ON EDGE: NOT AT ALL

## 2021-01-12 ASSESSMENT — MIFFLIN-ST. JEOR: SCORE: 1794.31

## 2021-01-12 NOTE — PROGRESS NOTES
"Assessment & Plan     Gastroesophageal reflux disease without esophagitis  We were able to look at pics of meds, and they recognized which ones there were, and will start taking.    Hypothyroidism, unspecified type  As above.  Discussed getting back on and follow up with lab draw in 6 weeks.    Did encourage him to follow up with psychiatry so they can adjust meds to where he does not get side effects               BMI:   Estimated body mass index is 27.97 kg/m  as calculated from the following:    Height as of this encounter: 1.74 m (5' 8.5\").    Weight as of this encounter: 84.7 kg (186 lb 11.2 oz).   Weight management plan: Discussed healthy diet and exercise guidelines          Return in about 6 weeks (around 2/23/2021) for Lab Work.    Roger Saavedra PA-C  [facility]     Jerrod Solis is a 30 year old male who presents to clinic today for the following health issues accompanied by his PCA:    MARK ANTHONY Elder has history of gastritis/ulcer and has been on PPI for several years.  He has recently stopped his meds. Was getting side effects from psych meds, but since all of his meds come in a pack, he stopped them all.  He does wonder what his omeprazole looks like.  The same goes for synthroid.  He does not really feel much different on that one, but knows he should be on.    He has been off for a couple of weeks, and is getting a bit of an upset stomach.      He has not scheduled any follow up with his psychiatrist to discuss meds.  Feels that he is doing ok.        Review of Systems   Constitutional, HEENT, cardiovascular, pulmonary, gi and gu systems are negative, except as otherwise noted.      Objective    /85 (BP Location: Right arm, Cuff Size: Adult Large)   Pulse 92   Temp 98.7  F (37.1  C) (Oral)   Ht 1.74 m (5' 8.5\")   Wt 84.7 kg (186 lb 11.2 oz)   SpO2 98%   BMI 27.97 kg/m    Body mass index is 27.97 kg/m .  Physical Exam   GENERAL: alert and no " distress  EYES: Eyes grossly normal to inspection  PSYCH: mentation appears normal, affect normal/bright

## 2021-01-13 ASSESSMENT — ANXIETY QUESTIONNAIRES: GAD7 TOTAL SCORE: 0

## 2021-01-15 ENCOUNTER — HEALTH MAINTENANCE LETTER (OUTPATIENT)
Age: 31
End: 2021-01-15

## 2021-01-20 ENCOUNTER — OFFICE VISIT (OUTPATIENT)
Dept: FAMILY MEDICINE | Facility: CLINIC | Age: 31
End: 2021-01-20
Payer: MEDICARE

## 2021-01-20 VITALS
OXYGEN SATURATION: 99 % | WEIGHT: 186.6 LBS | HEIGHT: 69 IN | SYSTOLIC BLOOD PRESSURE: 152 MMHG | HEART RATE: 105 BPM | DIASTOLIC BLOOD PRESSURE: 96 MMHG | TEMPERATURE: 98.2 F | BODY MASS INDEX: 27.64 KG/M2 | RESPIRATION RATE: 20 BRPM

## 2021-01-20 DIAGNOSIS — F06.30 MOOD DISORDER IN CONDITIONS CLASSIFIED ELSEWHERE: ICD-10-CM

## 2021-01-20 DIAGNOSIS — K21.9 GASTROESOPHAGEAL REFLUX DISEASE WITHOUT ESOPHAGITIS: ICD-10-CM

## 2021-01-20 DIAGNOSIS — R30.0 DYSURIA: Primary | ICD-10-CM

## 2021-01-20 DIAGNOSIS — E03.9 HYPOTHYROIDISM, UNSPECIFIED TYPE: ICD-10-CM

## 2021-01-20 LAB
ALBUMIN UR-MCNC: NEGATIVE MG/DL
APPEARANCE UR: CLEAR
BILIRUB UR QL STRIP: NEGATIVE
COLOR UR AUTO: YELLOW
GLUCOSE UR STRIP-MCNC: NEGATIVE MG/DL
HGB UR QL STRIP: NEGATIVE
KETONES UR STRIP-MCNC: NEGATIVE MG/DL
LEUKOCYTE ESTERASE UR QL STRIP: NEGATIVE
NITRATE UR QL: NEGATIVE
PH UR STRIP: 7 PH (ref 5–7)
SOURCE: NORMAL
SP GR UR STRIP: 1.01 (ref 1–1.03)
UROBILINOGEN UR STRIP-ACNC: 0.2 EU/DL (ref 0.2–1)

## 2021-01-20 PROCEDURE — 99214 OFFICE O/P EST MOD 30 MIN: CPT | Performed by: FAMILY MEDICINE

## 2021-01-20 PROCEDURE — 87491 CHLMYD TRACH DNA AMP PROBE: CPT | Performed by: FAMILY MEDICINE

## 2021-01-20 PROCEDURE — 81003 URINALYSIS AUTO W/O SCOPE: CPT | Performed by: FAMILY MEDICINE

## 2021-01-20 PROCEDURE — 87591 N.GONORRHOEAE DNA AMP PROB: CPT | Performed by: FAMILY MEDICINE

## 2021-01-20 ASSESSMENT — MIFFLIN-ST. JEOR: SCORE: 1793.85

## 2021-01-20 NOTE — PROGRESS NOTES
Assessment & Plan     Dysuria  I recommended checking a urinalysis today.  The results were normal.  He reports that this was a dirty catch.  Therefore, I added gonorrhea and chlamydia labs to be checked as well.  I recommended he stay well-hydrated.  He declined to have a genital exam today.  He reports being uncircumcised and has not noticed any erythema, irritation or rash present on his penis or in the urethra area.  If symptoms persist despite normal lab results I recommended he schedule follow-up appointment.  - *UA reflex to Microscopic and Culture ([place] [facility] (except [place] and [place])  - NEISSERIA GONORRHOEA PCR  - CHLAMYDIA TRACHOMATIS PCR    Hypothyroidism, unspecified type  I recommended he continue the levothyroxine.  It looks like he may be due to have his TSH rechecked.  He is due to come back to the clinic and see his PCP for physical exam.    Mood disorder in conditions classified elsewhere  I recommended he continue his current medications.  He is not able to tell me with certainty whether or not he is seeing a psychiatrist who manages these.  I suspect he is.    Gastroesophageal reflux disease without esophagitis  I recommended he continue omeprazole as prescribed.             No follow-ups on file.    Giorgio Palacios, DO  [facility]    Subjective     Jerrod is a 30 year old who presents to clinic today for the following health issues     HPI       Genitourinary - Male  Onset/Duration: x2 weeks   Description:   Dysuria (painful urination): YES  Hematuria (blood in urine): no  Frequency: no  Waking at night to urinate: no  Hesitancy (delay in urine): no  Retention (unable to empty): no  Decrease in urinary flow: no  Incontinence: no  Progression of Symptoms:  same  Accompanying Signs & Symptoms:  Fever: no  Back/Flank pain: no  Urethral discharge: no  Testicle lumps/masses/pain: no  Nausea and/or vomiting: no  Abdominal pain: no  History:   History  "of frequent UTI s: no  History of kidney stones: no  History of hernias: no  Personal or Family history of Prostate problems: no  Sexually active: no  Precipitating or alleviating factors: None  Therapies tried and outcome: none        He describes having burning with urination over the past couple of weeks.  He denies any penile discharge.  He denies scrotal pain or swelling.  He is uncircumcised and has not noticed any rashes or irritation on the glans of his penis or in/around the urethra.  He reports being sexually active with a female partner a few weeks ago.  He does not know if the sexual partner has had any symptoms.  He is not having abdominal, flank or back pain.  He reports that he lives in a group home because he has a history of fetal alcohol syndrome.  He reports living in a group home since he was 19.  He also has a history of hypothyroidism and GERD.  He feels like these issues are stable on his current medications.    Review of Systems   Constitutional, HEENT, cardiovascular, pulmonary, gi and gu systems are negative, except as otherwise noted.      Objective    BP (!) 169/66 (BP Location: Left arm, Patient Position: Sitting, Cuff Size: Adult Regular)   Pulse 105   Temp 98.2  F (36.8  C) (Tympanic)   Resp 20   Ht 1.74 m (5' 8.5\")   Wt 84.6 kg (186 lb 9.6 oz)   SpO2 99%   BMI 27.96 kg/m    Body mass index is 27.96 kg/m .  Physical Exam   GENERAL: healthy, alert and no distress  EYES: Eyes grossly normal to inspection, PERRL and conjunctivae and sclerae normal  NECK: no adenopathy, no asymmetry, masses, or scars and thyroid normal to palpation  RESP: lungs clear to auscultation - no rales, rhonchi or wheezes  CV: regular rate and rhythm, normal S1 S2, no S3 or S4, no murmur, click or rub, no peripheral edema and peripheral pulses strong  ABDOMEN: soft, nontender, no hepatosplenomegaly, no masses and bowel sounds normal  MS: no gross musculoskeletal defects noted, no edema  SKIN: no suspicious " lesions or rashes  NEURO: Normal strength and tone, mentation intact and speech normal  PSYCH: mentation appears normal, affect normal/bright  : Patient declined

## 2021-01-25 ENCOUNTER — VIRTUAL VISIT (OUTPATIENT)
Dept: PSYCHIATRY | Facility: CLINIC | Age: 31
End: 2021-01-25
Attending: SOCIAL WORKER
Payer: MEDICARE

## 2021-01-25 DIAGNOSIS — Q86.0 FETAL ALCOHOL SYNDROME: ICD-10-CM

## 2021-01-25 DIAGNOSIS — F39 MOOD DISORDER (H): ICD-10-CM

## 2021-01-25 DIAGNOSIS — F43.10 PTSD (POST-TRAUMATIC STRESS DISORDER): Primary | ICD-10-CM

## 2021-01-25 PROCEDURE — 90791 PSYCH DIAGNOSTIC EVALUATION: CPT | Mod: 95 | Performed by: SOCIAL WORKER
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
## 2021-02-01 ENCOUNTER — VIRTUAL VISIT (OUTPATIENT)
Dept: PSYCHIATRY | Facility: CLINIC | Age: 31
End: 2021-02-01
Attending: NURSE PRACTITIONER
Payer: MEDICARE

## 2021-02-01 ENCOUNTER — TELEPHONE (OUTPATIENT)
Dept: PSYCHIATRY | Facility: CLINIC | Age: 31
End: 2021-02-01

## 2021-02-01 DIAGNOSIS — F06.30 MOOD DISORDER IN CONDITIONS CLASSIFIED ELSEWHERE: ICD-10-CM

## 2021-02-01 DIAGNOSIS — Z79.899 ENCOUNTER FOR LONG-TERM (CURRENT) USE OF MEDICATIONS: Primary | ICD-10-CM

## 2021-02-01 PROCEDURE — 99203 OFFICE O/P NEW LOW 30 MIN: CPT | Mod: 95 | Performed by: NURSE PRACTITIONER

## 2021-02-01 NOTE — TELEPHONE ENCOUNTER
On 2/1/2021, at 1:04, writer called patient at [phone] to confirm Virtual Visit. Writer unable to make contact with patient. Writer left detailed voice message for call back. [phone] left as call back number. Leslie Mai, [facility]
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
## 2021-02-09 ENCOUNTER — OFFICE VISIT (OUTPATIENT)
Dept: FAMILY MEDICINE | Facility: CLINIC | Age: 31
End: 2021-02-09
Payer: MEDICARE

## 2021-02-09 VITALS
SYSTOLIC BLOOD PRESSURE: 138 MMHG | DIASTOLIC BLOOD PRESSURE: 88 MMHG | OXYGEN SATURATION: 97 % | HEART RATE: 99 BPM | HEIGHT: 69 IN | WEIGHT: 188 LBS | RESPIRATION RATE: 14 BRPM | BODY MASS INDEX: 27.85 KG/M2

## 2021-02-09 DIAGNOSIS — F06.30 MOOD DISORDER IN CONDITIONS CLASSIFIED ELSEWHERE: ICD-10-CM

## 2021-02-09 DIAGNOSIS — Z00.00 ROUTINE GENERAL MEDICAL EXAMINATION AT A HEALTH CARE FACILITY: Primary | ICD-10-CM

## 2021-02-09 DIAGNOSIS — E03.9 HYPOTHYROIDISM, UNSPECIFIED TYPE: ICD-10-CM

## 2021-02-09 DIAGNOSIS — F41.9 ANXIETY: ICD-10-CM

## 2021-02-09 DIAGNOSIS — R73.01 ELEVATED FASTING GLUCOSE: ICD-10-CM

## 2021-02-09 DIAGNOSIS — K21.00 GASTROESOPHAGEAL REFLUX DISEASE WITH ESOPHAGITIS WITHOUT HEMORRHAGE: ICD-10-CM

## 2021-02-09 LAB
ERYTHROCYTE [DISTWIDTH] IN BLOOD BY AUTOMATED COUNT: 14.4 % (ref 10–15)
HBA1C MFR BLD: 5.6 % (ref 0–5.6)
HCT VFR BLD AUTO: 43.7 % (ref 40–53)
HGB BLD-MCNC: 13.7 G/DL (ref 13.3–17.7)
LITHIUM SERPL-SCNC: 1.31 MMOL/L (ref 0.6–1.2)
MCH RBC QN AUTO: 26 PG (ref 26.5–33)
MCHC RBC AUTO-ENTMCNC: 31.4 G/DL (ref 31.5–36.5)
MCV RBC AUTO: 83 FL (ref 78–100)
PLATELET # BLD AUTO: 152 10E9/L (ref 150–450)
RBC # BLD AUTO: 5.27 10E12/L (ref 4.4–5.9)
WBC # BLD AUTO: 6.2 10E9/L (ref 4–11)

## 2021-02-09 PROCEDURE — 80053 COMPREHEN METABOLIC PANEL: CPT | Performed by: FAMILY MEDICINE

## 2021-02-09 PROCEDURE — 85027 COMPLETE CBC AUTOMATED: CPT | Performed by: FAMILY MEDICINE

## 2021-02-09 PROCEDURE — 80178 ASSAY OF LITHIUM: CPT | Performed by: FAMILY MEDICINE

## 2021-02-09 PROCEDURE — 99395 PREV VISIT EST AGE 18-39: CPT | Performed by: FAMILY MEDICINE

## 2021-02-09 PROCEDURE — 93000 ELECTROCARDIOGRAM COMPLETE: CPT | Performed by: FAMILY MEDICINE

## 2021-02-09 PROCEDURE — 83036 HEMOGLOBIN GLYCOSYLATED A1C: CPT | Performed by: FAMILY MEDICINE

## 2021-02-09 PROCEDURE — 99214 OFFICE O/P EST MOD 30 MIN: CPT | Mod: 25 | Performed by: FAMILY MEDICINE

## 2021-02-09 PROCEDURE — 84443 ASSAY THYROID STIM HORMONE: CPT | Performed by: FAMILY MEDICINE

## 2021-02-09 PROCEDURE — 36415 COLL VENOUS BLD VENIPUNCTURE: CPT | Performed by: FAMILY MEDICINE

## 2021-02-09 RX ORDER — LEVOTHYROXINE SODIUM 50 UG/1
50 TABLET ORAL EVERY MORNING
Qty: 90 TABLET | Refills: 2 | Status: SHIPPED | OUTPATIENT
Start: 2021-02-09 | End: 2022-07-15

## 2021-02-09 ASSESSMENT — ACTIVITIES OF DAILY LIVING (ADL): CURRENT_FUNCTION: NO ASSISTANCE NEEDED

## 2021-02-09 ASSESSMENT — PAIN SCALES - GENERAL: PAINLEVEL: NO PAIN (0)

## 2021-02-09 ASSESSMENT — MIFFLIN-ST. JEOR: SCORE: 1800.2

## 2021-02-09 NOTE — PROGRESS NOTES
"SUBJECTIVE:   CC: Jerrod Solis is an 30 year old male who presents for preventative health visit.       Patient has been advised of split billing requirements and indicates understanding: Yes     Healthy Habits:     In general, how would you rate your overall health?  Fair    Frequency of exercise:  2-3 days/week    Duration of exercise:  15-30 minutes    Do you usually eat at least 4 servings of fruit and vegetables a day, include whole grains    & fiber and avoid regularly eating high fat or \"junk\" foods?  No    Taking medications regularly:  Yes    Medication side effects:  Other    Ability to successfully perform activities of daily living:  No assistance needed    Home Safety:  No safety concerns identified    Hearing Impairment:  No hearing concerns    In the past 6 months, have you been bothered by leaking of urine?  No    In general, how would you rate your overall mental or emotional health?  Fair      PHQ-2 Total Score: 0    Additional concerns today:  No   1) hypothyroidism, needs refills on the synthroid , he is on 50 mcg and will check tsh   2) GERD he is on omeprazole 20 mg and doing well on this , currently was off his meds for a while and started to have abdominal pain but went back on the medications and now feels better   He had been off the psychiatric meds but saw a new psychiatrist , has been back on his medications for three weeks now and doing much better       Today's PHQ-2 Score:   PHQ-2 ( 1999 [employer]) 2/9/2021   Q1: Little interest or pleasure in doing things 0   Q2: Feeling down, depressed or hopeless 0   PHQ-2 Score 0   Q1: Little interest or pleasure in doing things Not at all   Q2: Feeling down, depressed or hopeless Not at all   PHQ-2 Score 0       Abuse: Current or Past(Physical, Sexual or Emotional)- No  Do you feel safe in your environment? Yes    Social History     Tobacco Use     Smoking status: Former Smoker     Smokeless tobacco: Never Used   Substance Use Topics     Alcohol " use: No     If you drink alcohol do you typically have >3 drinks per day or >7 drinks per week? Not applicable    Alcohol Use 2021   Prescreen: >3 drinks/day or >7 drinks/week? Not Applicable   Prescreen: >3 drinks/day or >7 drinks/week? -   No flowsheet data found.    Last PSA: No results found for: PSA    Reviewed orders with patient. Reviewed health maintenance and updated orders accordingly - Yes  Labs reviewed in EPIC  BP Readings from Last 3 Encounters:   21 138/88   21 (!) 152/96   21 127/85    Wt Readings from Last 3 Encounters:   21 188 lb (85.3 kg)   21 186 lb 9.6 oz (84.6 kg)   21 186 lb 11.2 oz (84.7 kg)                  Patient Active Problem List   Diagnosis     Disturbance of conduct     Attention deficit hyperactivity disorder (ADHD)     Traumatic shock (H)     Alcohol affecting fetus or  via placenta or breast milk     Mood disorder in conditions classified elsewhere     Oppositional defiant disorder     CARDIOVASCULAR SCREENING; LDL GOAL LESS THAN 160     Hypothyroidism     Helicobacter positive gastritis     Hypothyroidism due to medication     Hypothyroidism, unspecified type     Gastroesophageal reflux disease without esophagitis     Elevated cholesterol with elevated triglycerides     No past surgical history on file.    Social History     Tobacco Use     Smoking status: Former Smoker     Smokeless tobacco: Never Used   Substance Use Topics     Alcohol use: No     Family History   Problem Relation Age of Onset     Diabetes No family hx of      Coronary Artery Disease No family hx of      Hypertension No family hx of      Hyperlipidemia No family hx of      Cerebrovascular Disease No family hx of      Breast Cancer No family hx of      Colon Cancer No family hx of      Prostate Cancer No family hx of      Other Cancer No family hx of      Depression No family hx of      Anxiety Disorder No family hx of      Mental Illness No family hx of       Substance Abuse No family hx of      Anesthesia Reaction No family hx of      Asthma No family hx of      Osteoporosis No family hx of      Genetic Disorder No family hx of      Thyroid Disease No family hx of      Obesity No family hx of      Unknown/Adopted No family hx of          Current Outpatient Medications   Medication Sig Dispense Refill     ABILIFY 10 MG tablet Take 10 mg by mouth daily        buPROPion (WELLBUTRIN XL) 300 MG 24 hr tablet every morning       levothyroxine (SYNTHROID/LEVOTHROID) 50 MCG tablet Take 1 tablet (50 mcg) by mouth every morning 90 tablet 2     lithium (ESKALITH/LITHOBID) 300 MG CR tablet        OLANZapine (ZYPREXA) 2.5 MG tablet        OLANZapine (ZYPREXA) 5 MG tablet Take 5 mg by mouth At Bedtime Pt taking 1/2 tab ( 2.5 mg) daily at bedtime       omeprazole (PRILOSEC) 20 MG DR capsule TAKE 1 CAPSULE BY MOUTH DAILY 30-60 MINUTES BEFORE A MEAL 90 capsule 2     Allergies   Allergen Reactions     Depakote [Valproic Acid]      Ibuprofen Hives     Pt on Lithium and CANNOT take ibuprofen with this medication     Recent Labs   Lab Test 02/09/21  1319 12/11/19  1458 10/18/19  1300 10/01/19  1316 07/25/18  1044 01/09/18  1116 11/13/17  1257 11/13/17  1257 12/14/16  1335 12/14/16  1335 10/19/16  1619   A1C 5.6 5.5  --   --   --  5.0  --   --   --   --   --    LDL  --   --   --  167* Cannot estimate LDL when triglyceride exceeds 400 mg/dL  --   --   --   --  131*  --    HDL  --   --   --  55 47  --   --   --   --  72  --    TRIG  --   --   --  132 641*  --   --   --   --  113  --    ALT  --   --   --  23  --   --   --  35  --   --  33   CR  --   --  1.20 1.32*  --   --   --  1.27*  --   --  1.14   GFRESTIMATED  --   --  82 73  --   --   --  69  --   --  78   GFRESTBLACK  --   --  >90 85  --   --   --  83  --   --  >90  African American GFR Calc     POTASSIUM  --   --  3.7 4.0 4.0  --   --  3.8  --   --  4.1   TSH  --   --  1.05 1.13 1.78  --    < >  --    < >  --  0.02*    < > = values in  this interval not displayed.        Reviewed and updated as needed this visit by clinical staff                 Reviewed and updated as needed this visit by Provider                Past Medical History:   Diagnosis Date     Alcohol affecting fetus or  via placenta or breast milk      Attention deficit disorder with hyperactivity(314.01)      Closed fracture of base of other metacarpal bone(s)     R 5th metacarpal     Mood disorder in conditions classified elsewhere      Oppositional defiant disorder of childhood or adolescence      Traumatic shock (H)     PTSD, reactive attachment disorder     Unspecified disturbance of conduct     explosive behavior disorder      No past surgical history on file.    Review of Systems  CONSTITUTIONAL: NEGATIVE for fever, chills, change in weight  INTEGUMENTARY/SKIN: NEGATIVE for worrisome rashes, moles or lesions  EYES: NEGATIVE for vision changes or irritation  ENT: NEGATIVE for ear, mouth and throat problems  RESP: NEGATIVE for significant cough or SOB  CV: NEGATIVE for chest pain, palpitations or peripheral edema  GI: NEGATIVE for nausea, abdominal pain, heartburn, or change in bowel habits   male: negative for dysuria, hematuria, decreased urinary stream, erectile dysfunction, urethral discharge  MUSCULOSKELETAL: NEGATIVE for significant arthralgias or myalgia  NEURO: NEGATIVE for weakness, dizziness or paresthesias  PSYCHIATRIC: NEGATIVE for changes in mood or affect    OBJECTIVE:   There were no vitals taken for this visit.    Physical Exam  GENERAL: healthy, alert and no distress  EYES: Eyes grossly normal to inspection, PERRL and conjunctivae and sclerae normal  HENT: ear canals and TM's normal, nose and mouth without ulcers or lesions  NECK: no adenopathy, no asymmetry, masses, or scars and thyroid normal to palpation  RESP: lungs clear to auscultation - no rales, rhonchi or wheezes  CV: regular rate and rhythm, normal S1 S2, no S3 or S4, no murmur, click or  rub, no peripheral edema and peripheral pulses strong  ABDOMEN: soft, nontender, no hepatosplenomegaly, no masses and bowel sounds normal  MS: no gross musculoskeletal defects noted, no edema  SKIN: no suspicious lesions or rashes  NEURO: Normal strength and tone, mentation intact and speech normal  PSYCH: mentation appears normal, affect normal/bright    Diagnostic Test Results:  Labs reviewed in Epic  Results for orders placed or performed in visit on 02/09/21 (from the past 24 hour(s))   Hemoglobin A1c   Result Value Ref Range    Hemoglobin A1C 5.6 0 - 5.6 %   CBC with platelets   Result Value Ref Range    WBC 6.2 4.0 - 11.0 10e9/L    RBC Count 5.27 4.4 - 5.9 10e12/L    Hemoglobin 13.7 13.3 - 17.7 g/dL    Hematocrit 43.7 40.0 - 53.0 %    MCV 83 78 - 100 fl    MCH 26.0 (L) 26.5 - 33.0 pg    MCHC 31.4 (L) 31.5 - 36.5 g/dL    RDW 14.4 10.0 - 15.0 %    Platelet Count 152 150 - 450 10e9/L       ASSESSMENT/PLAN:   1. Routine general medical examination at a health care facility  Is healthy physically, some minor concerns today   He has restarted his psychiatric medications and has been on them for three weeks now and doing much better .    2. Hypothyroidism, unspecified type  Will check his thyroid labs today , he is on the synthroid 50 mcg daily   - TSH with free T4 reflex  - levothyroxine (SYNTHROID/LEVOTHROID) 50 MCG tablet; Take 1 tablet (50 mcg) by mouth every morning  Dispense: 90 tablet; Refill: 2    3. Mood disorder in conditions classified elsewhere  We did labs per his psychiatrist , also EKG done which was normal   - CBC with platelets  - Comprehensive metabolic panel (BMP + Alb, Alk Phos, ALT, AST, Total. Bili, TP)  - Lithium level  - EKG 12-lead complete w/read - Clinics    4. Anxiety  As above , currently on his meds and under control     5. Elevated fasting glucose  Will check today   - Hemoglobin A1c    Patient has been advised of split billing requirements and indicates understanding: Yes  COUNSELING:  "  Reviewed preventive health counseling, as reflected in patient instructions       Regular exercise       Healthy diet/nutrition       Vision screening       Safe sex practices/STD prevention    Estimated body mass index is 27.96 kg/m  as calculated from the following:    Height as of 1/20/21: 1.74 m (5' 8.5\").    Weight as of 1/20/21: 84.6 kg (186 lb 9.6 oz).         He reports that he has quit smoking. He has never used smokeless tobacco.      Counseling Resources:  ATP IV Guidelines  Pooled Cohorts Equation Calculator  FRAX Risk Assessment  ICSI Preventive Guidelines  Dietary Guidelines for Americans, 2010  USDA's MyPlate  ASA Prophylaxis  Lung CA Screening    Tatiana Juan MD  [facility]  "

## 2021-02-10 LAB
ALBUMIN SERPL-MCNC: 4 G/DL (ref 3.4–5)
ALP SERPL-CCNC: 86 U/L (ref 40–150)
ALT SERPL W P-5'-P-CCNC: 29 U/L (ref 0–70)
ANION GAP SERPL CALCULATED.3IONS-SCNC: 3 MMOL/L (ref 3–14)
AST SERPL W P-5'-P-CCNC: 16 U/L (ref 0–45)
BILIRUB SERPL-MCNC: 0.9 MG/DL (ref 0.2–1.3)
BUN SERPL-MCNC: 10 MG/DL (ref 7–30)
CALCIUM SERPL-MCNC: 10.1 MG/DL (ref 8.5–10.1)
CHLORIDE SERPL-SCNC: 105 MMOL/L (ref 94–109)
CO2 SERPL-SCNC: 29 MMOL/L (ref 20–32)
CREAT SERPL-MCNC: 1.37 MG/DL (ref 0.66–1.25)
GFR SERPL CREATININE-BSD FRML MDRD: 69 ML/MIN/{1.73_M2}
GLUCOSE SERPL-MCNC: 92 MG/DL (ref 70–99)
POTASSIUM SERPL-SCNC: 3.8 MMOL/L (ref 3.4–5.3)
PROT SERPL-MCNC: 8 G/DL (ref 6.8–8.8)
SODIUM SERPL-SCNC: 137 MMOL/L (ref 133–144)
TSH SERPL DL<=0.005 MIU/L-ACNC: 2.12 MU/L (ref 0.4–4)

## 2021-02-12 ENCOUNTER — OFFICE VISIT (OUTPATIENT)
Dept: FAMILY MEDICINE | Facility: CLINIC | Age: 31
End: 2021-02-12
Payer: MEDICARE

## 2021-02-12 VITALS
WEIGHT: 188 LBS | DIASTOLIC BLOOD PRESSURE: 80 MMHG | HEART RATE: 78 BPM | OXYGEN SATURATION: 98 % | SYSTOLIC BLOOD PRESSURE: 138 MMHG | BODY MASS INDEX: 27.85 KG/M2 | RESPIRATION RATE: 14 BRPM | HEIGHT: 69 IN

## 2021-02-12 DIAGNOSIS — M79.10 MUSCLE PAIN: ICD-10-CM

## 2021-02-12 DIAGNOSIS — R79.89 ELEVATED SERUM CREATININE: ICD-10-CM

## 2021-02-12 DIAGNOSIS — R10.9 FLANK PAIN: Primary | ICD-10-CM

## 2021-02-12 DIAGNOSIS — F06.30 MOOD DISORDER IN CONDITIONS CLASSIFIED ELSEWHERE: ICD-10-CM

## 2021-02-12 PROCEDURE — 99214 OFFICE O/P EST MOD 30 MIN: CPT | Performed by: FAMILY MEDICINE

## 2021-02-12 ASSESSMENT — MIFFLIN-ST. JEOR: SCORE: 1800.2

## 2021-02-12 ASSESSMENT — PAIN SCALES - GENERAL: PAINLEVEL: MODERATE PAIN (5)

## 2021-02-12 NOTE — PROGRESS NOTES
Assessment & Plan     Flank pain  Seems most likely to be muscular as a result of crunched sleeping position on the cough , although back to his bed , feeling better     Muscle pain  As above     Elevated serum creatinine  Went over his labs from last OV , mild elevation in his creatinine, he just restarted all of meds and also possible he was dehydrated , since there is also higher level of the lithium , he will discuss with his psychiatrist , but we will repeat creatinine in a month and advised to come well hydrated and not fasting     Mood disorder in conditions classified elsewhere  As above , he restarted his lithium and will follow up with his psychiatrist       Discussed with the pt and a staff person form the group home who was with him today              No follow-ups on file.    Tatiana Juan MD  [facility]   Jerrod is a 30 year old who presents for the following health issues     HPI       1)Patient in for side and back pain that has been going on for a while now per patient, Patient states he took tylenol yesterday and today . States that he had to sleep on his couch for a while as it was very cold in his bedroom, now he is back in the bedroom as the heating problem has been fixed but still aches in the sides cristal, when sleeping on the couch, it was sagging in the middle and his body was crunched up with both head anc legs elevated   2) follow up on his labs requested per his psychiatrist as he is on lithium and has restarted his medications after stopping them for a while              Review of Systems   Constitutional, HEENT, cardiovascular, pulmonary, GI, , musculoskeletal, neuro, skin, endocrine and psych systems are negative, except as otherwise noted.      Objective    There were no vitals taken for this visit.  There is no height or weight on file to calculate BMI.  Physical Exam   GENERAL: healthy, alert and no distress  EYES: Eyes grossly normal to  inspection, PERRL and conjunctivae and sclerae normal  NECK: no adenopathy, no asymmetry, masses, or scars and thyroid normal to palpation  RESP: lungs clear to auscultation - no rales, rhonchi or wheezes  CV: regular rate and rhythm, normal S1 S2, no S3 or S4, no murmur, click or rub, no peripheral edema and peripheral pulses strong  ABDOMEN: soft, nontender, no hepatosplenomegaly, no masses and bowel sounds normal  MS: no gross musculoskeletal defects noted, no edema  NEURO: Normal strength and tone, mentation intact and speech normal    No results found for any visits on 02/12/21.

## 2021-03-05 ENCOUNTER — VIRTUAL VISIT (OUTPATIENT)
Dept: PSYCHIATRY | Facility: CLINIC | Age: 31
End: 2021-03-05
Attending: NURSE PRACTITIONER
Payer: MEDICARE

## 2021-03-05 ENCOUNTER — TELEPHONE (OUTPATIENT)
Dept: PSYCHIATRY | Facility: CLINIC | Age: 31
End: 2021-03-05

## 2021-03-05 DIAGNOSIS — Z53.9 NO SHOW: Primary | ICD-10-CM

## 2021-03-05 ASSESSMENT — PAIN SCALES - GENERAL: PAINLEVEL: NO PAIN (0)

## 2021-03-05 NOTE — PATIENT INSTRUCTIONS
**For crisis resources, please see the information at the end of this document**     Patient Education      Thank you for coming to the [facility] [place] [facility].    Lab Testing:  If you had lab testing today and your results are reassuring or normal they will be mailed to you or sent through [employer] within 7 days. If the lab tests need quick action we will call you with the results. The phone number we will call with results is # [phone] (home) . If this is not the best number please call our clinic and change the number.    Medication Refills:  If you need any refills please call your pharmacy and they will contact us. Our fax number for refills is [phone]. Please allow three business for refill processing. If you need to  your refill at a new pharmacy, please contact the new pharmacy directly. The new pharmacy will help you get your medications transferred.     Scheduling:  If you have any concerns about today's visit or wish to schedule another appointment please call our office during normal business hours [phone] (8-5:00 M-F)    Contact Us:  Please call [phone] during business hours (8-5:00 M-F).  If after clinic hours, or on the weekend, please call  [phone].    Financial Assistance [phone]  [facility] Billing [phone]  Central Billing Office, [facility]: [phone]  [facility] Billing [phone]  Medical Records [phone]  [facility] Patient Bill of Rights [email or web address]       MENTAL HEALTH CRISIS NUMBERS:  For a medical emergency please call  911 or go to the nearest ER.     [facility]:   [facility] -[phone]   Crisis Residence [facility] -[phone]   Walk-In Counseling Center [facility] -[phone]   COPE 24/7 [place] Mobile Team -[phone] (adults)/[phone] (child)  CHILD: [facility] needs assessment  team - [phone]      [facility]:   [facility] - [phone]   Walk-in counseling [facility] - [phone]   Walk-in counseling [facility] - [phone]   Crisis Residence [facility] - [phone]  Urgent Care Adult Mental [facility]-[phone] mobile unit/ 24/7 crisis line    National Crisis Numbers:   National Suicide Prevention Lifeline: 2-911-345-TALK (102-215-9955)  Poison Control Center - 4-257-816-1395  Zannel/resources for a list of additional resources (SOS)  Trans Lifeline a hotline for transgender people 1-785.329.6800  The [employer] Project a hotline for LGBT youth 5-448-710-3057  Crisis Text Line: For any crisis 24/7   To: 615602  see www.crisistextline.org  - IF MAKING A CALL FEELS TOO HARD, send a text!         Again thank you for choosing [facility] and please let us know how we can best partner with you to improve you and your family's health.    You may be receiving a survey regarding this appointment. We would love to have your feedback, both positive and negative. The survey is done by an external company, so your answers are anonymous.

## 2021-03-05 NOTE — PROGRESS NOTES
"VIDEO VISIT  Jerrod Solis is a 30 year old patient who is being evaluated via a billable video visit.      The patient has been notified of following:   \"This video visit will be conducted via a call between you and your physician/provider. We have found that certain health care needs can be provided without the need for an in-person physical exam. This service lets us provide the care you need with a video conversation. If a prescription is necessary we can send it directly to your pharmacy. If lab work is needed we can place an order for that and you can then stop by our lab to have the test done at a later time. Insurers are generally covering virtual visits as they would in-office visits so billing should not be different than normal.  If for some reason you do get billed incorrectly, you should contact the billing office to correct it and that number is in the AVS .    Video Conference to be completed via:  [email or web address]    Patient has given verbal consent for video visit?:  Yes    Patient would prefer that any video invitations be sent by: Send to e-mail at: [email or web address]      How would patient like to obtain AVS?:  Uniiverse    AVS SmartPhrase [PsychAVS] has been placed in 'Patient Instructions':  Yes    "

## 2021-03-05 NOTE — TELEPHONE ENCOUNTER
Patient didn't answer 2 calls or log on either platform. Invited he and staff to call back to reschedule. Shared schedling and lab/ EKG update with Dad/ guardian Lola.

## 2021-04-02 ENCOUNTER — TELEPHONE (OUTPATIENT)
Dept: PSYCHIATRY | Facility: CLINIC | Age: 31
End: 2021-04-02

## 2021-04-02 NOTE — TELEPHONE ENCOUNTER
Left two VMs for client inviting call back to reschedule. Spoke with Dad/ guardian Lola to share missed visit and to see if he could reach Jerrod. Spoke with Erik from note, she no longer works with Jerrod.

## 2021-05-24 NOTE — PROGRESS NOTES
Assessment & Plan     Acute pain of right knee  No bony abnormalities on the X rays , we discussed rest, ice elevate at night , also NSAIDs as needed for the pain , will start PT as well . Seems that he has been off the basketball for over a year and now restarted playing , so will need some conditioning to get back to where he was .  - XR Knee Right 3 Views  - [facility] RE[facility]; Future    Mood disorder in conditions classified elsewhere  Due for lithium levels check   - Lithium level; Future    Elevated serum creatinine  Will check , per psychiatry monitoring creatinine as it was elevated few months ago .  - Basic metabolic panel  (Ca, Cl, CO2, Creat, Gluc, K, Na, BUN); Future                 No follow-ups on file.    Tatiana Juan MD  [facility]   Jerrod is a 30 year old who presents for the following health issues accompanied by his PCA    HPI     Musculoskeletal problem/pain both knees hurt sides pain , kong after playing basketball , cracking noise when extending and pain , pain after physically active , no swelling , no redness , no injury , new now as they have not hurt for a whole right worse than the left   Onset/Duration: x1 year  Description  Location: knee - bilateral  Joint Swelling: no  Redness: no  Pain: YES- sharp   Warmth: no  Intensity:  mild  Progression of Symptoms:  worsening  Accompanying signs and symptoms:   Fevers: no  Numbness/tingling/weakness: no  History  Trauma to the area: no  Recent illness:  no  Previous similar problem: no  Previous evaluation:  no  Precipitating or alleviating factors:  Aggravating factors include: bending knees  Therapies tried and outcome: rest/inactivity and acetaminophen - helpful         Review of Systems   Constitutional, HEENT, cardiovascular, pulmonary, GI, , musculoskeletal, neuro, skin, endocrine and psych systems are negative, except as otherwise noted.      Objective    /82 (Patient Position: Sitting,  "Cuff Size: Adult Large)   Pulse 79   Temp 98.7  F (37.1  C) (Tympanic)   Resp 20   Ht 1.74 m (5' 8.5\")   Wt 85.9 kg (189 lb 6.4 oz)   SpO2 96%   BMI 28.38 kg/m    Body mass index is 28.38 kg/m .  Physical Exam   GENERAL: healthy, alert and no distress  EYES: Eyes grossly normal to inspection, PERRL and conjunctivae and sclerae normal  NECK: no adenopathy, no asymmetry, masses, or scars and thyroid normal to palpation  RESP: lungs clear to auscultation - no rales, rhonchi or wheezes  CV: regular rate and rhythm, normal S1 S2, no S3 or S4, no murmur, click or rub, no peripheral edema and peripheral pulses strong  MS: no gross musculoskeletal defects noted, no edema    Results for orders placed or performed in visit on 05/25/21   XR Knee Right 3 Views     Status: None    Narrative    KNEE RIGHT THREE VIEWS   5/25/2021 12:27 PM     HISTORY: Acute pain of right knee.    COMPARISON: None.      Impression    IMPRESSION: Normal joint spacing. No fracture or effusion.    BIBI JOSHI MD               "

## 2021-05-25 ENCOUNTER — ANCILLARY PROCEDURE (OUTPATIENT)
Dept: GENERAL RADIOLOGY | Facility: CLINIC | Age: 31
End: 2021-05-25
Attending: FAMILY MEDICINE
Payer: MEDICARE

## 2021-05-25 ENCOUNTER — OFFICE VISIT (OUTPATIENT)
Dept: FAMILY MEDICINE | Facility: CLINIC | Age: 31
End: 2021-05-25
Payer: MEDICARE

## 2021-05-25 VITALS
WEIGHT: 189.4 LBS | OXYGEN SATURATION: 96 % | RESPIRATION RATE: 20 BRPM | HEIGHT: 69 IN | BODY MASS INDEX: 28.05 KG/M2 | DIASTOLIC BLOOD PRESSURE: 82 MMHG | HEART RATE: 79 BPM | TEMPERATURE: 98.7 F | SYSTOLIC BLOOD PRESSURE: 133 MMHG

## 2021-05-25 DIAGNOSIS — F06.30 MOOD DISORDER IN CONDITIONS CLASSIFIED ELSEWHERE: ICD-10-CM

## 2021-05-25 DIAGNOSIS — M25.561 ACUTE PAIN OF RIGHT KNEE: Primary | ICD-10-CM

## 2021-05-25 DIAGNOSIS — R79.89 ELEVATED SERUM CREATININE: ICD-10-CM

## 2021-05-25 PROCEDURE — 73562 X-RAY EXAM OF KNEE 3: CPT | Mod: RT | Performed by: RADIOLOGY

## 2021-05-25 PROCEDURE — 99214 OFFICE O/P EST MOD 30 MIN: CPT | Performed by: FAMILY MEDICINE

## 2021-05-25 ASSESSMENT — MIFFLIN-ST. JEOR: SCORE: 1806.55

## 2021-05-27 PROBLEM — R79.89 ELEVATED SERUM CREATININE: Status: ACTIVE | Noted: 2021-05-27

## 2021-06-10 ENCOUNTER — VIRTUAL VISIT (OUTPATIENT)
Dept: PSYCHIATRY | Facility: CLINIC | Age: 31
End: 2021-06-10
Attending: NURSE PRACTITIONER
Payer: MEDICARE

## 2021-06-10 DIAGNOSIS — F06.30 MOOD DISORDER IN CONDITIONS CLASSIFIED ELSEWHERE: Primary | ICD-10-CM

## 2021-06-10 PROCEDURE — 99213 OFFICE O/P EST LOW 20 MIN: CPT | Mod: 95 | Performed by: NURSE PRACTITIONER

## 2021-06-10 ASSESSMENT — PAIN SCALES - GENERAL: PAINLEVEL: MODERATE PAIN (5)
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
## 2021-06-10 NOTE — PATIENT INSTRUCTIONS
**For crisis resources, please see the information at the end of this document**     Patient Education      Thank you for coming to the [facility] MENTAL HEALTH & ADDICTION [place] [facility].    Lab Testing:  If you had lab testing today and your results are reassuring or normal they will be mailed to you or sent through Carestream within 7 days. If the lab tests need quick action we will call you with the results. The phone number we will call with results is # [phone] (home) . If this is not the best number please call our clinic and change the number.    Medication Refills:  If you need any refills please call your pharmacy and they will contact us. Our fax number for refills is [phone]. Please allow three business for refill processing. If you need to  your refill at a new pharmacy, please contact the new pharmacy directly. The new pharmacy will help you get your medications transferred.     Scheduling:  If you have any concerns about today's visit or wish to schedule another appointment please call our office during normal business hours [phone] (8-5:00 M-F)    Contact Us:  Please call [phone] during business hours (8-5:00 M-F).  If after clinic hours, or on the weekend, please call  [phone].    Financial Assistance [phone]  [facility] Billing [phone]  Central Billing Office, [facility]: [phone]  [facility] Billing [phone]  Medical Records [phone]  [facility] Patient Bill of Rights [email or web address]       MENTAL HEALTH CRISIS NUMBERS:  For a medical emergency please call  911 or go to the nearest ER.     [facility]:   [facility] -[phone]   Crisis Residence [facility] -[phone]   Walk-In Counseling Center [facility] -[phone]   COPE 24/7 [place] Mobile Team -[phone] (adults)/[phone] (child)  CHILD: [facility] needs assessment  team - [phone]      [facility]:   [facility] - [phone]   Walk-in counseling [facility] - [phone]   Walk-in counseling [facility] - [phone]   Crisis Residence [facility] Residence - [phone]  Urgent Care Adult Mental Gzfmbq-[phone] mobile unit/ 24/7 crisis line    National Crisis Numbers:   National Suicide Prevention Lifeline: 6-827-353-TALK (653-595-0024)  Poison Control Center - 0-460-587-3479  For [employer]/resources for a list of additional resources (SOS)  Trans Lifeline a hotline for transgender people 1-525.732.7959  The Bonifacio Project a hotline for LGBT youth 2-063-026-4929  Crisis Text Line: For any crisis 24/7   To: 604540  see www.crisistextline.org  - IF MAKING A CALL FEELS TOO HARD, send a text!         Again thank you for choosing [facility] MENTAL HEALTH & ADDICTION [facility] and please let us know how we can best partner with you to improve you and your family's health.    You may be receiving a survey regarding this appointment. We would love to have your feedback, both positive and negative. The survey is done by an external company, so your answers are anonymous.

## 2021-06-22 ENCOUNTER — TELEPHONE (OUTPATIENT)
Dept: FAMILY MEDICINE | Facility: CLINIC | Age: 31
End: 2021-06-22

## 2021-06-22 NOTE — TELEPHONE ENCOUNTER
Reason for Call:  Form, our goal is to have forms completed with 72 hours, however, some forms may require a visit or additional information.    Type of letter, form or note:  medical-med list to sign and return to [facility]    Who is the form from?: [facility] (if other please explain)    Where did the form come from: form was faxed in    What clinic location was the form placed at?: [facility]    Where the form was placed: Dr Juan Box/Folder    What number is listed as a contact on the form?: [phone]       Additional comments:     Call taken on 6/22/2021 at 3:11 PM by Josie Verduzco

## 2021-08-19 ENCOUNTER — VIRTUAL VISIT (OUTPATIENT)
Dept: PSYCHIATRY | Facility: CLINIC | Age: 31
End: 2021-08-19
Attending: NURSE PRACTITIONER
Payer: MEDICARE

## 2021-08-19 DIAGNOSIS — F06.30 MOOD DISORDER IN CONDITIONS CLASSIFIED ELSEWHERE: Primary | ICD-10-CM

## 2021-08-19 PROCEDURE — 99214 OFFICE O/P EST MOD 30 MIN: CPT | Mod: 95 | Performed by: NURSE PRACTITIONER

## 2021-08-19 RX ORDER — LITHIUM CARBONATE 300 MG/1
TABLET, FILM COATED, EXTENDED RELEASE ORAL
Qty: 120 TABLET | Refills: 2 | Status: SHIPPED | OUTPATIENT
Start: 2021-08-19 | End: 2021-11-11

## 2021-08-19 RX ORDER — ARIPIPRAZOLE 10 MG/1
10 TABLET ORAL DAILY
Qty: 30 TABLET | Refills: 2 | Status: SHIPPED | OUTPATIENT
Start: 2021-08-19 | End: 2021-11-11

## 2021-08-19 RX ORDER — OLANZAPINE 2.5 MG/1
2.5 TABLET, FILM COATED ORAL AT BEDTIME
Qty: 30 TABLET | Refills: 2 | Status: SHIPPED | OUTPATIENT
Start: 2021-08-19 | End: 2021-11-11

## 2021-08-19 RX ORDER — BUPROPION HYDROCHLORIDE 300 MG/1
300 TABLET ORAL EVERY MORNING
Qty: 30 TABLET | Refills: 2 | Status: SHIPPED | OUTPATIENT
Start: 2021-08-19 | End: 2021-11-11

## 2021-08-19 ASSESSMENT — PAIN SCALES - GENERAL: PAINLEVEL: NO PAIN (0)

## 2021-08-19 NOTE — PATIENT INSTRUCTIONS
**For crisis resources, please see the information at the end of this document**     Patient Education      Thank you for coming to the [facility] MENTAL HEALTH & ADDICTION [place] [facility].    Lab Testing:  If you had lab testing today and your results are reassuring or normal they will be mailed to you or sent through iSTAR within 7 days. If the lab tests need quick action we will call you with the results. The phone number we will call with results is # [phone] (home) . If this is not the best number please call our clinic and change the number.    Medication Refills:  If you need any refills please call your pharmacy and they will contact us. Our fax number for refills is [phone]. Please allow three business for refill processing. If you need to  your refill at a new pharmacy, please contact the new pharmacy directly. The new pharmacy will help you get your medications transferred.     Scheduling:  If you have any concerns about today's visit or wish to schedule another appointment please call our office during normal business hours [phone] (8-5:00 M-F)    Contact Us:  Please call [phone] during business hours (8-5:00 M-F).  If after clinic hours, or on the weekend, please call  [phone].    Financial Assistance [phone]  [facility] Billing [phone]  Central Billing Office, [facility]: [phone]  [facility] Billing [phone]  Medical Records [phone]  [facility] Patient Bill of Rights [email or web address]       MENTAL HEALTH CRISIS NUMBERS:  For a medical emergency please call  911 or go to the nearest ER.     [facility]:   [facility] -[phone]   Crisis Residence [facility] Residence -[phone]   Walk-In Counseling Center [facility] -[phone]   COPE 24/7 [place] Mobile Team -[phone] (adults)/[phone] (child)  CHILD: [facility] needs assessment  team - [phone]      [facility]:   [facility] - [phone]   Walk-in counseling [facility] - [phone]   Walk-in counseling [facility] - [phone]   Crisis Residence [facility] - [phone]  Urgent Care Adult Mental Dglxah-[phone] mobile unit/ 24/7 crisis line    National Crisis Numbers:   National Suicide Prevention Lifeline: 4-638-152-TALK (069-779-6870)  Poison Control Center - 2-826-262-1669  [facility]/resources for a list of additional resources (SOS)  Trans Lifeline a hotline for transgender people 1-629.807.8594  The Bonifacio Project a hotline for LGBT youth 1-739-069-2730  Crisis Text Line: For any crisis 24/7   To: 062858  see www.crisistextline.org  - IF MAKING A CALL FEELS TOO HARD, send a text!         Again thank you for choosing [facility] MENTAL HEALTH & ADDICTION [facility] and please let us know how we can best partner with you to improve you and your family's health.    You may be receiving a survey regarding this appointment. We would love to have your feedback, both positive and negative. The survey is done by an external company, so your answers are anonymous.

## 2021-08-19 NOTE — PROGRESS NOTES
"VIDEO VISIT  Jerrod Solis is a 30 year old patient who is being evaluated via a billable video visit.      The patient has been notified of following:   \"This video visit will be conducted via a call between you and your physician/provider. We have found that certain health care needs can be provided without the need for an in-person physical exam. This service lets us provide the care you need with a video conversation. If a prescription is necessary we can send it directly to your pharmacy. If lab work is needed we can place an order for that and you can then stop by our lab to have the test done at a later time. Insurers are generally covering virtual visits as they would in-office visits so billing should not be different than normal.  If for some reason you do get billed incorrectly, you should contact the billing office to correct it and that number is in the AVS .    Video Conference to be completed via:  [email or web address]    Patient has given verbal consent for video visit?:  Yes    Patient would prefer that any video invitations be sent by: Send to e-mail at: [email or web address]      How would patient like to obtain AVS?:  [place]    AVS SmartPhrase [PsychAVS] has been placed in 'Patient Instructions':  Yes     Video- Visit Details  Type of service:  video visit for medication management  Time of service:    Date:  08/19/2021    Video Start Time:  2:33p        Video End Time: 2:48p    Reason for video visit:  Patient has requested telehealth visit  Originating Site (patient location):  [facility]   Location- Patient's home  Distant Site (provider location):  Remote location  Mode of Communication:  Video Conference via [email or web address]  Consent:  Patient has given verbal consent for video visit?: Yes          [facility]  Psychiatry Clinic  PSYCHIATRIC PROGRESS NOTE       Jerrod Solis is a 30 year old male who prefers the name Jerrod and pronouns he, him, his, himself.  Therapist: sees Kaley Galdamez " "as needed over the last 8 years  PCP: Tatiana Juan  Other Providers:  - lives at group home [facility] (supervisor Erik [phone])  - pharmacy is [facility] [phone]  - lives in his own apartment, weekly meds delivered on Wed without oversight  - guardian/ Dad Lola   -  Carrington Hernandez through [facility]    PREVIOUS PSYCH  MED TRIALS:  - Depakote (listed as an allergy, unknown response)  - Abilify  - olanzapine  - Wellbutrin  - lithium    Pertinent Background:  See previous notes.  Psych critical item history includes trauma hx, violent behavior and few known details re: psychiatric history    Interim History     [4, 4]     The patient is a limited historian and reports inconsistent treatment adherence. Last seen on 6/10/2021 when he chose to continue olanzapine 2.5mg at bed, Abilify 10mg daily, Wellbutrin XL 300mg QAM, lithium 300mg (2) BID.     He presents alone, his staff supervisor Darrick () was not present. Dad/ guardian Lola is available ().    Medical meds include Prilosec, levothyroxine 50mcg    Since the last visit , he's been OK.  - he's interested in \"self medicating\", by this he means, giving himself his meds. He doesn't find it sufficient to  meds from staff.  - his met his SO online in 2020, they started actually dating in August, she's moving in in Nov  - that she's moving in is what leads him him to ask about giving himself his own meds  - he picks up his med pack for the week once a week on Tuesdays  - he plans to talk to Darrick about this as an option  - he notes his body feels good with meds, his mood has been better, he has patience now and feels more peaceful  - playing basketball everyday unless it's raining  - less knee pain  - talking to Dad everyday  - Dad previously reported lithium has been critical for his mood and functioning since [age]yo  - enjoys exercise (lifting weights), playing basketbal  - coping skills include " "listening to music and playing basketball  - support from his Dad/ guardian    Recent Symptoms:   Depression: denies feeling depressed, irritable, intact energy and motivation  Psychosis:  denies AVH, denies paranoid or persecutory delusions, no concerns about the intentions of others  Anxiety: he denies any anxiety  Trauma Related:  \"I don't talk about that stuff, I'm a pretty strong person\"    Diagnosed with fetal alcohol syndrome as a child with reported FSIQ 46. He does not read or write but was able to report letters and numbers from his pills to identify what he is taking.    ADVERSE EFFECTS: he denies  MEDICAL CONCERNS: none today    APPETITE: OK, 189# on 2021  - drinks 5-6 bottles of water during and after basketball    SLEEP: sleeping 10-12 hours usually 11p - 8a; sleeps with the TV on; identifies as a night owl; denies naps     Recent Substance Use:  Caffeine- one 20oz soda a daily, one energy drinks a week     Social/ Family History      [1ea,1ea]            [per patient report]                 FINANCIAL SUPPORT- social security disability and Dad/guardian is his payee       CHILDREN- never , no kids       LIVING SITUATION- lives independently in his own apartment at his group home  LEGAL- none  EARLY HISTORY/ EDUCATION- moved from [place] to [place] at 7yo, removed from his Mom's custody and placed in foster care. Aware he has 12 siblings in [place]. He thinks he graduated high school [employer].  SOCIAL/ SPIRITUAL SUPPORT- limited social support, identifies as not Sikh.       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 "     Mood disorder in conditions classified elsewhere     Oppositional defiant disorder     CARDIOVASCULAR SCREENING; LDL GOAL LESS THAN 160     Hypothyroidism     Helicobacter positive gastritis     Hypothyroidism due to medication     Hypothyroidism, unspecified type     Gastroesophageal reflux disease without esophagitis     Elevated cholesterol with elevated triglycerides     Elevated serum creatinine       Past Surgical History:   Procedure Laterality Date     BIOPSY  [place]      Medical Review of Systems         [2,10]     A comprehensive review of systems was performed and is negative other than noted in the HPI.    Aware he had a head injury playing basketball when he was younger, denies LOC. Denies seizures.    Allergy    Depakote [valproic acid] and Ibuprofen     Depakote- unspecified    Current Medications        Current Outpatient Medications   Medication Sig Dispense Refill     ABILIFY 10 MG tablet Take 10 mg by mouth daily        buPROPion (WELLBUTRIN XL) 300 MG 24 hr tablet every morning       levothyroxine (SYNTHROID/LEVOTHROID) 50 MCG tablet Take 1 tablet (50 mcg) by mouth every morning 90 tablet 2     lithium (ESKALITH/LITHOBID) 300 MG CR tablet        OLANZapine (ZYPREXA) 2.5 MG tablet        OLANZapine (ZYPREXA) 5 MG tablet Take 5 mg by mouth At Bedtime Pt taking 1/2 tab ( 2.5 mg) daily at bedtime       omeprazole (PRILOSEC) 20 MG DR capsule TAKE 1 CAPSULE BY MOUTH DAILY 30-60 MINUTES BEFORE A MEAL 90 capsule 2     Vitals         [3, 3]   There were no vitals taken for this visit.   Mental Status Exam        [9, 14 cog gs]     Alertness: alert  and oriented  Appearance: adequately groomed  Behavior/Demeanor: cooperative, pleasant and calm, with fair  eye contact   Speech: normal and regular rate and rhythm  Language: no problems  Psychomotor: restless  Mood: engaged, calm and pleasant; description consistent with euthymia  Affect: restricted and appropriate; was congruent to mood; was congruent to  content  Thought Process/Associations: thought blocking  Thought Content:  Reports paranoid ideation;  Denies suicidal ideation, violent ideation, preoccupations, obsessions , phobia , magical thinking and over-valued ideas  Perception:  Reports none;  Denies auditory hallucinations, visual hallucinations, visual distortion seen as shadows , depersonalization and derealization  Insight: gaining/ maintaining insight is challenging  Judgment: adequate for safety  Cognition: (6) does  appear grossly intact; formal cognitive testing was not done  fund of knowledge: delayed  Gait/Station and/or Muscle Strength/Tone: n/a; pacing at start of assessment    Labs and Data                          Rating Scales:    N/A    PHQ9 Today:    PHQ 10/19/2016 1/12/2021   PHQ-9 Total Score 1 0   Q9: Thoughts of better off dead/self-harm past 2 weeks Not at all Not at all     ANTIPSYCHOTIC LABS    Recent Labs   Lab Test 02/09/21  1319 12/11/19  1458 10/18/19  1300 10/01/19  1316 01/09/18  1116   GLC 92  --  106* 90 96   A1C 5.6 5.5  --   --  5.0     Recent Labs   Lab Test 10/01/19  1316 07/25/18  1044 12/14/16  1335   CHOL 248* 239* 226*   TRIG 132 641* 113   * Cannot estimate LDL when triglyceride exceeds 400 mg/dL 131*   HDL 55 47 72     Recent Labs   Lab Test 02/09/21  1319 10/01/19  1316 11/13/17  1257   AST 16 13 28   ALT 29 23 35   ALKPHOS 86 92 88     Recent Labs   Lab Test 02/09/21  1319 10/01/19  1316 07/25/18  1044 01/09/15  1309   WBC 6.2 6.4 7.1 7.2   ANEU  --  2.4 3.0 3.6   HGB 13.7 13.7 13.9 15.0    165 167 202     Diagnosis      Impression includes unspecified mood disorder, history of verbal and physical outbursts plus ODD     Assessment      [m2, h3]     TODAY, the following items were discussed:    : 02/2021    PSYCHOTROPIC DRUG INTERACTIONS:  - ARIPIPRAZOLE  -- MIRTAZAPINE -- OLANZAPINE may result in increased risk of QT interval prolongation and ventricular arrhythmias (eg, Torsades de Pointes).   -  ARIPIPRAZOLE  -- OLANZAPINE may result in increased risk of QT interval prolongation.   - ARIPIPRAZOLE  -- BUPROPION may result in increased exposure of aripiprazole.   - LITHIUM  -- MIRTAZAPINE may result in increased risk of serotonin syndrome.   - LITHIUM  -- OLANZAPINE may result in weakness, dyskinesias, increased extrapyramidal symptoms, encephalopathy, and brain damage.     Drug Interaction Management: Monitoring for adverse effects, routine vitals, routine labs, periodic EKGs, using lowest therapeutic dose of [psychotropics] and patient is aware of risks    Plan                                                                                                                     m2, h3     1) for now, Jerrod chooses to continue olanzapine 2.5mg at bed, Abilify 10mg daily, Wellbutrin XL 300mg QAM, lithium 300mg (2) BID  - validated efforts for daily med compliance, monitoring mood, paranoia  - Jerrod prefers a 2 or 230p visit so he can play basketball 3-7p  - patient has previously reported tremor to Dad    Spoke with Randolphrae briefly before the visit; no med changes; patient will discuss desire to administer his own meds first with staff supervisor Darrick and we'll discuss at RTC. Since Lola is on vacation in [place], Jerrod will share update from today's visit when they talk later.     - previously encouraged Darrick to be present for the first few minutes of Jerrod's visits wanting to protect Jerrod's independence and privacy by allowing him space to choose who/ if joins his visits.    2) sees therapist Kaley of 8 years as needed    3) monitoring labs including LDL, EKG (Feb 2020 NSR with 390 QTc and Nov 2017 RKG with borderline rhythm and 364 QTc    RTC: 8 weeks, sooner as needed    CRISIS NUMBERS:   Provided routinely in AVS.    Treatment Risk Statement:  The patient understands the risks, benefits, adverse effects and alternatives. Agrees to treatment with the capacity to do so. No medical contraindications to  treatment. Agrees to call clinic for any problems. The patient understands to call 911 or go to the nearest ED if life threatening or urgent symptoms occur.     WHODAS 2.0  TODAY total score = N/A; [a 12-item WHODAS 2.0 assessment was not completed by the pt today and/or recorded in EPIC].    PROVIDER:  TERENCE Park CNP

## 2021-09-19 ENCOUNTER — HEALTH MAINTENANCE LETTER (OUTPATIENT)
Age: 31
End: 2021-09-19

## 2021-09-20 RX ORDER — OLANZAPINE 5 MG/1
TABLET ORAL
Qty: 31 TABLET | Refills: 11 | OUTPATIENT
Start: 2021-09-20

## 2021-10-26 ENCOUNTER — TELEPHONE (OUTPATIENT)
Dept: PSYCHIATRY | Facility: CLINIC | Age: 31
End: 2021-10-26

## 2021-10-26 NOTE — TELEPHONE ENCOUNTER
Last seen: 8/19  RTC: 8 weeks   Cancel: none   No-show: none   Next appt: 11/11    Incoming refill from patient via phone      Disp Refills Start End DEVIN   buPROPion (WELLBUTRIN XL) 300 MG 24 hr tablet 30 tablet 2 8/19/2021  No   Sig - Route: Take 1 tablet (300 mg) by mouth every morning - Oral   Sent to pharmacy as: buPROPion HCl ER (XL) 300 MG Oral Tablet Extended Release 24 Hour (WELLBUTRIN XL)   Class: E-Prescribe   Order: [id number]   E-Prescribing Status: Receipt confirmed by pharmacy (8/19/2021  2:57 PM CDT)      Disp Refills Start End DEVIN   lithium ER (LITHOBID) 300 MG CR tablet 120 tablet 2 8/19/2021  No   Sig: Take two (2) tabs by mouth twice daily   Sent to pharmacy as: Bode Carbonate  MG Oral Tablet Extended Release (LITHOBID)   Class: E-Prescribe   Order: [id number]   E-Prescribing Status: Receipt confirmed by pharmacy (8/19/2021  2:57 PM CDT)      Disp Refills Start End DEVIN   ARIPiprazole (ABILIFY) 10 MG tablet 30 tablet 2 8/19/2021  No   Sig - Route: Take 1 tablet (10 mg) by mouth daily - Oral   Sent to pharmacy as: ARIPiprazole 10 MG Oral Tablet (Abilify)   Class: E-Prescribe   Order: [id number]   E-Prescribing Status: Receipt confirmed by pharmacy (8/19/2021  2:57 PM CDT)     Per chart review, patient should have refills on file. Placed a call to [facility] [place] And spoke with pharmacist who confirmed 2 refills on file for all medications. She also confirmed the last 30 day supply were signed by staff on 10/3. Patient should also be receiving November fills today or tomorrow.     Writer placed a call to Lola knapp to relay above information. Niranjan shared he is stuck in the middle of the  and pharmacy. He is requesting this writer reach out to  staff, Darrick Delgado at [phone]. Writer agreed with the plan.     Placed a call to  staff Darrick Delgado (ALFONZO placed in scanning). She was unable to discuss concerns at this time and requested she  call back. Writer agreed with the plan.      [facility]: [phone]

## 2021-10-26 NOTE — TELEPHONE ENCOUNTER
Writer received incoming call from  staff, Darrick Delgado returning this writer's call. She confirmed that patient has enough medication for the next few days. She will reach out to pharmacy for additional details on the next delivery. She agreed to call back for further questions or concerns.

## 2021-10-26 NOTE — TELEPHONE ENCOUNTER
Health Call Center    Phone Message    May a detailed message be left on voicemail: yes     Reason for Call: Medication Refill Request    Has the patient contacted the pharmacy for the refill? Yes   Name of medication being requested: Bupropion, Aripiprazole, Lithium  Provider who prescribed the medication: Mercedez Bello  Pharmacy: [facility]. [place], [place] - [address]  Date medication is needed: ASAP. Patient was given a 2 day emergency supply. Guardian (Lola) was contacted by group home that stated they have been in contact with pharmacy and the pharmacy will not give Jerrod refills without a referral from provider. Lola was confused by this request and told the group home he would call and report this.         Action Taken: Message routed to:  Other: P [facility]    Travel Screening: Not Applicable

## 2021-10-26 NOTE — TELEPHONE ENCOUNTER
On 10/26/21 the patient signed an ALFONZO authorizing medical records to be released from to [facility] to [facility] Darrick Mcgraw for the purpose of continuing care. I faxed the request to [phone]. I sent the original to ALFONZO to scanning and held original until scanning is confirmed.  Albina Mai, CMA

## 2021-10-26 NOTE — TELEPHONE ENCOUNTER
On 10/26/2021 the patient signed an ALFONZO authorizing medical records to be exchanged between [facility] and [facility] for the purpose of continuing care. The request was sent to scanning on October 26, 2021 and held a copy in Psychiatry until scanning is confirmed. Juan Arce, EMT

## 2021-11-11 ENCOUNTER — VIRTUAL VISIT (OUTPATIENT)
Dept: PSYCHIATRY | Facility: CLINIC | Age: 31
End: 2021-11-11
Attending: NURSE PRACTITIONER
Payer: MEDICARE

## 2021-11-11 DIAGNOSIS — F06.30 MOOD DISORDER IN CONDITIONS CLASSIFIED ELSEWHERE: ICD-10-CM

## 2021-11-11 PROCEDURE — 99214 OFFICE O/P EST MOD 30 MIN: CPT | Mod: 95 | Performed by: NURSE PRACTITIONER

## 2021-11-11 RX ORDER — OLANZAPINE 2.5 MG/1
2.5 TABLET, FILM COATED ORAL AT BEDTIME
Qty: 30 TABLET | Refills: 2 | Status: SHIPPED | OUTPATIENT
Start: 2021-11-11 | End: 2022-01-14

## 2021-11-11 RX ORDER — LITHIUM CARBONATE 300 MG/1
TABLET, FILM COATED, EXTENDED RELEASE ORAL
Qty: 90 TABLET | Refills: 2 | Status: SHIPPED | OUTPATIENT
Start: 2021-11-11 | End: 2022-01-14

## 2021-11-11 RX ORDER — ARIPIPRAZOLE 10 MG/1
10 TABLET ORAL DAILY
Qty: 30 TABLET | Refills: 2 | Status: SHIPPED | OUTPATIENT
Start: 2021-11-11 | End: 2022-01-14

## 2021-11-11 RX ORDER — BUPROPION HYDROCHLORIDE 300 MG/1
300 TABLET ORAL EVERY MORNING
Qty: 30 TABLET | Refills: 2 | Status: SHIPPED | OUTPATIENT
Start: 2021-11-11 | End: 2022-01-14

## 2021-11-11 ASSESSMENT — PAIN SCALES - GENERAL: PAINLEVEL: NO PAIN (0)

## 2021-11-11 NOTE — PROGRESS NOTES
"VIDEO VISIT  Jerrod Solis is a 31year old patient who is being evaluated via a billable video visit.      The patient has been notified of following:   \"This video visit will be conducted via a call between you and your physician/provider. We have found that certain health care needs can be provided without the need for an in-person physical exam. This service lets us provide the care you need with a video conversation. If a prescription is necessary we can send it directly to your pharmacy. If lab work is needed we can place an order for that and you can then stop by our lab to have the test done at a later time. Insurers are generally covering virtual visits as they would in-office visits so billing should not be different than normal.  If for some reason you do get billed incorrectly, you should contact the billing office to correct it and that number is in the AVS .    Video Conference to be completed via:  [email or web address]    Patient has given verbal consent for video visit?:  Yes    Patient would prefer that any video invitations be sent by: Send to e-mail at: [email or web address]      How would patient like to obtain AVS?:  [facility]    AVS SmartPhrase [PsychAVS] has been placed in 'Patient Instructions':  Yes     Video- Visit Details  Type of service:  video visit for medication management  Time of service:    Date:  11/11/2021    Video Start Time:  2:06p        Video End Time: 2:38p    Reason for video visit:  Patient has requested telehealth visit  Originating Site (patient location):  [facility]   Location- Patient's home  Distant Site (provider location):  Remote location  Mode of Communication:  Video Conference via [email or web address]  Consent:  Patient has given verbal consent for video visit?: Yes          [facility]  Psychiatry Clinic  PSYCHIATRIC PROGRESS NOTE       Jerrod Solis is a 31 year old male who prefers the name Jerrod and pronouns he, him, his, himself.  Therapist: sees Kaley Galdamez as " needed over the last 8 years  PCP: Tatiana Juan  Other Providers:  - lives at group home [facility] (supervisor Erik [phone])  - pharmacy is [facility] [phone]  - lives in his own apartment, weekly meds delivered on Wed without oversight  - guardian/ Dad Lola   - CM Carrington Hernandez through [place]    PREVIOUS PSYCH  MED TRIALS:  - Depakote (listed as an allergy, unknown response)  - Abilify  - olanzapine  - Wellbutrin  - lithium    Pertinent Background:  See previous notes.  Psych critical item history includes trauma hx, violent behavior and few known details re: psychiatric history    Interim History     [4, 4]     The patient is a fair historian and reports improved treatment adherence. Last seen on 8/19/2021 when he chose to continue olanzapine 2.5mg at bed, Abilify 10mg daily, Wellbutrin XL 300mg QAM, lithium 600mg BID.     He presents with Dad/ guardian Lola at ()  - his staff supervisor Darrick () was not present  - he gets meds dropped off on Tuedays and then Jerrod takes on his own    Medical meds include Prilosec, levothyroxine 50mcg    Since the last visit , he's been OK.  - he had a quiet birthday  - Jerrod is getting good feedback from his staff, Dad perceives he's doing well  - his annual meeting is next week  - he's playing basketball on video games, watching basketball and pranks on Youtube  - Jerrod and Lola are pleased with how well he's managing his meds  - he has a new game system and TV  - processes losses with his past friend group, prefers to talk to Dad and worker Paresh  - setting boundaries with his SO who has made hurtful comments   - over winter, he plans to seek healthy groceries, uses ankle weights and dumb bells to gain strength  - talking to Dad everyday  - Dad previously reported lithium has been critical for his mood and functioning since [age]yo  - enjoys exercise (lifting weights), playing basketbal  - coping skills  "include listening to music and playing basketball  - support from his Dad/ guardian    Recent Symptoms:   Depression: he denies significant depression, irritability   Psychosis:  denies AVH, denies paranoid or persecutory delusions, no concerns about the intentions of others  Anxiety: he denies, feels his neighbors are \"annoying\" and he's not paranoid or anxious about them  Trauma Related:  \"I don't talk about that stuff, I'm a pretty strong person\"    Diagnosed with fetal alcohol syndrome as a child with reported FSIQ 46. While he does not read or write, he has reported  letters and numbers from his pills to identify what he is taking.    ADVERSE EFFECTS: he denies  MEDICAL CONCERNS: less knee pain    Recent Labs   Lab Test 02/09/21  1319 10/01/19  1316 07/25/18  1044   LITHIUM 1.31* 0.92 0.78       APPETITE: OK, 189# in May 2021, finds he's more hungry overnight, likes snacks  - drinks 5-6 bottles of water, he urinates 3-4x daily and not overnight    SLEEP: taking meds at 7p, sleeping 11p-10 or 11a; sleeps with the TV on; identifies as a night owl; denies naps     Recent Substance Use:  Caffeine- usually drinks 20oz soda a daily, denies energy drinks, prefers milk, Gatorade    Social/ Family History      [1ea,1ea]            [per patient report]                 FINANCIAL SUPPORT- social security disability and Dad/guardian is his payee       CHILDREN- never , no kids       LIVING SITUATION- lives independently in his own apartment at his group home  LEGAL- none  EARLY HISTORY/ EDUCATION- moved from [place] to [place] at 7yo, removed from his Mom's custody and placed in foster care. Aware he has 12 siblings in [place]. He thinks he graduated high school [employer].  SOCIAL/ SPIRITUAL SUPPORT- limited social support, identifies as not Sikhism.       CULTURAL INFLUENCES/ IMPACT- none         TRAUMA HISTORY- extensive, he declines to discuss  FEELS SAFE AT HOME- Yes  FAMILY HISTORY-  Unknown to Jerrod    Medical / " Surgical History                                 Patient Active Problem List   Diagnosis     Disturbance of conduct     Attention deficit hyperactivity disorder (ADHD)     Traumatic shock (H)     Alcohol affecting fetus or  via placenta or breast milk     Mood disorder in conditions classified elsewhere     Oppositional defiant disorder     CARDIOVASCULAR SCREENING; LDL GOAL LESS THAN 160     Hypothyroidism     Helicobacter positive gastritis     Hypothyroidism due to medication     Hypothyroidism, unspecified type     Gastroesophageal reflux disease without esophagitis     Elevated cholesterol with elevated triglycerides     Elevated serum creatinine       Past Surgical History:   Procedure Laterality Date     BIOPSY  [place]      Medical Review of Systems         [2,10]     A comprehensive review of systems was performed and is negative other than noted in the HPI.    Aware he had a head injury playing basketball when he was younger, denies LOC. Denies seizures.    Allergy    Depakote [valproic acid] and Ibuprofen     Depakote- unspecified reaction    Current Medications        Current Outpatient Medications   Medication Sig Dispense Refill     ARIPiprazole (ABILIFY) 10 MG tablet Take 1 tablet (10 mg) by mouth daily 30 tablet 2     buPROPion (WELLBUTRIN XL) 300 MG 24 hr tablet Take 1 tablet (300 mg) by mouth every morning 30 tablet 2     levothyroxine (SYNTHROID/LEVOTHROID) 50 MCG tablet Take 1 tablet (50 mcg) by mouth every morning 90 tablet 2     lithium ER (LITHOBID) 300 MG CR tablet Take two (2) tabs by mouth twice daily 120 tablet 2     OLANZapine (ZYPREXA) 2.5 MG tablet Take 1 tablet (2.5 mg) by mouth At Bedtime 30 tablet 2     OLANZapine (ZYPREXA) 5 MG tablet Take 5 mg by mouth At Bedtime Pt taking 1/2 tab ( 2.5 mg) daily at bedtime       omeprazole (PRILOSEC) 20 MG DR capsule TAKE 1 CAPSULE BY MOUTH DAILY 30-60 MINUTES BEFORE A MEAL 90 capsule 2     Vitals         [3, 3]   There were no vitals taken  for this visit.   Mental Status Exam        [9, 14 cog gs]     Alertness: alert  and oriented  Appearance: adequately groomed  Behavior/Demeanor: cooperative, pleasant and calm, with fair  eye contact   Speech: normal and regular rate and rhythm  Language: no problems  Psychomotor: restless  Mood: description consistent with euthymia  Affect: restricted and appropriate; was congruent to mood; was congruent to content  Thought Process/Associations: thought blocking  Thought Content:  Reports paranoid ideation;  Denies suicidal ideation, violent ideation, preoccupations, obsessions , phobia , magical thinking and over-valued ideas  Perception:  Reports none;  Denies auditory hallucinations, visual hallucinations, visual distortion seen as shadows , depersonalization and derealization  Insight: gaining/ maintaining insight is challenging  Judgment: adequate for safety  Cognition: (6) does  appear grossly intact; formal cognitive testing was not done  fund of knowledge: delayed  Gait/Station and/or Muscle Strength/Tone: n/a; pacing at start of assessment    Labs and Data                          Rating Scales:    N/A    PHQ9 Today:    PHQ 10/19/2016 1/12/2021   PHQ-9 Total Score 1 0   Q9: Thoughts of better off dead/self-harm past 2 weeks Not at all Not at all     ANTIPSYCHOTIC LABS    Recent Labs   Lab Test 02/09/21  1319 12/11/19  1458 10/18/19  1300 10/01/19  1316 07/25/18  1044 01/09/18  1116   GLC 92  --  106* 90   < > 96   A1C 5.6 5.5  --   --   --  5.0    < > = values in this interval not displayed.     Recent Labs   Lab Test 10/01/19  1316 07/25/18  1044 12/14/16  1335   CHOL 248* 239* 226*   TRIG 132 641* 113   * Cannot estimate LDL when triglyceride exceeds 400 mg/dL 131*   HDL 55 47 72     Recent Labs   Lab Test 02/09/21  1319 10/01/19  1316 11/13/17  1257   AST 16 13 28   ALT 29 23 35   ALKPHOS 86 92 88     Recent Labs   Lab Test 02/09/21  1319 10/01/19  1316 07/25/18  1044 10/14/15  1320  01/09/15  1309   WBC 6.2 6.4 7.1   < > 7.2   ANEU  --  2.4 3.0  --  3.6   HGB 13.7 13.7 13.9   < > 15.0    165 167   < > 202    < > = values in this interval not displayed.     Diagnosis      Impression includes unspecified mood disorder, history of verbal and physical outbursts plus ODD     Assessment      [m2, h3]     TODAY, the following items were discussed:    : 02/2021    PSYCHOTROPIC DRUG INTERACTIONS:  - ARIPIPRAZOLE  -- MIRTAZAPINE -- OLANZAPINE may result in increased risk of QT interval prolongation and ventricular arrhythmias (eg, Tors[place] de Pointes).   - ARIPIPRAZOLE  -- OLANZAPINE may result in increased risk of QT interval prolongation.   - ARIPIPRAZOLE  -- BUPROPION may result in increased exposure of aripiprazole.   - LITHIUM  -- MIRTAZAPINE may result in increased risk of serotonin syndrome.   - LITHIUM  -- OLANZAPINE may result in weakness, dyskinesias, increased extrapyramidal symptoms, encephalopathy, and brain damage.     Drug Interaction Management: Monitoring for adverse effects, routine vitals, routine labs, periodic EKGs, using lowest therapeutic dose of [psychotropics] and patient is aware of risks    Plan                                                                                                                     m2, h3     1) for now, Jerrod chooses to continue olanzapine 2.5mg at bed, Abilify 10mg daily, Wellbutrin XL 300mg QAM, trial reducing lithium from 600mg BID to 300mg QAM and 600mg QHS  - monitoring mood stability and tremor  - reviewed Feb labs, process for labs trough, fasting labs  - Jerrod prefers a 2 or 230p visit so he can play basketball 3-7p    - previously encouraged staff to be present for the first few minutes of Jerrod's visits to protect Jerrod's independence and privacy by allowing him space to choose who/ if joins his visits.    2) sees therapist Kaley of 8 years as needed    3) monitoring labs including LDL, EKG (Feb 2021 NSR with 390 QTc; Nov 2017  RKG with borderline rhythm and 364 QTc    RTC: 4 weeks, sooner as needed    CRISIS NUMBERS:   Provided routinely in AVS.    Treatment Risk Statement:  The patient understands the risks, benefits, adverse effects and alternatives. Agrees to treatment with the capacity to do so. No medical contraindications to treatment. Agrees to call clinic for any problems. The patient understands to call 911 or go to the nearest ED if life threatening or urgent symptoms occur.     WHODAS 2.0  TODAY total score = N/A; [a 12-item WHODAS 2.0 assessment was not completed by the pt today and/or recorded in EPIC].    PROVIDER:  TERENCE Park CNP

## 2021-11-11 NOTE — PATIENT INSTRUCTIONS
**For crisis resources, please see the information at the end of this document**     Patient Education      Thank you for coming to the [facility] MENTAL HEALTH & ADDICTION [place] [facility].    Lab Testing:  If you had lab testing today and your results are reassuring or normal they will be mailed to you or sent through [employer] within 7 days. If the lab tests need quick action we will call you with the results. The phone number we will call with results is # [phone] (home) . If this is not the best number please call our clinic and change the number.    Medication Refills:  If you need any refills please call your pharmacy and they will contact us. Our fax number for refills is [phone]. Please allow three business for refill processing. If you need to  your refill at a new pharmacy, please contact the new pharmacy directly. The new pharmacy will help you get your medications transferred.     Scheduling:  If you have any concerns about today's visit or wish to schedule another appointment please call our office during normal business hours [phone] (8-5:00 M-F)    Contact Us:  Please call [phone] during business hours (8-5:00 M-F).  If after clinic hours, or on the weekend, please call  [phone].    Financial Assistance [phone]  [facility] Billing [phone]  Central Billing Office, [facility]: [phone]  [facility] Billing [phone]  Medical Records [phone]  [facility] Patient Bill of Rights [email or web address]       MENTAL HEALTH CRISIS NUMBERS:  For a medical emergency please call  911 or go to the nearest ER.     [facility]:   [facility] -[phone]   Crisis Residence [facility] Residence -[phone]   Walk-In Counseling Center [facility] -[phone]   COPE 24/7 [place] Mobile Team -[phone] (adults)/[phone] (child)  CHILD: [facility] needs assessment  team - [phone]      [facility]:   [facility] - [phone]   Walk-in counseling [facility] - [phone]   Walk-in counseling [facility] - [phone]   Crisis Residence [facility] Res[facility] - [phone]  Urgent Care Adult Mental [place]-[phone] mobile unit/ 24/7 crisis line    National Crisis Numbers:   National Suicide Prevention Lifeline: 5-755-313-TALK (124-684-0390)  Poison Control Center - 2-890-906-7725  [place]/resources for a list of additional resources (SOS)  Trans Lifeline a hotline for transgender people 1-306.525.7071  The [employer] a hotline for LGBT youth 1-709-680-1705  Crisis Text Line: For any crisis 24/7   To: 393429  see www.crisistextline.org  - IF MAKING A CALL FEELS TOO HARD, send a text!         Again thank you for choosing [facility] MENTAL HEALTH & ADDICTION [facility] and please let us know how we can best partner with you to improve you and your family's health.    You may be receiving a survey regarding this appointment. We would love to have your feedback, both positive and negative. The survey is done by an external company, so your answers are anonymous.

## 2021-12-15 ENCOUNTER — VIRTUAL VISIT (OUTPATIENT)
Dept: PSYCHIATRY | Facility: CLINIC | Age: 31
End: 2021-12-15
Attending: NURSE PRACTITIONER
Payer: MEDICARE

## 2021-12-15 DIAGNOSIS — F06.30 MOOD DISORDER IN CONDITIONS CLASSIFIED ELSEWHERE: Primary | ICD-10-CM

## 2021-12-15 PROCEDURE — 99442 PR PHYSICIAN TELEPHONE EVALUATION 11-20 MIN: CPT | Mod: 95 | Performed by: NURSE PRACTITIONER

## 2021-12-15 ASSESSMENT — PAIN SCALES - GENERAL: PAINLEVEL: NO PAIN (0)

## 2021-12-15 NOTE — PATIENT INSTRUCTIONS
**For crisis resources, please see the information at the end of this document**   Patient Education    Thank you for coming to the [facility] MENTAL HEALTH & ADDICTION [place] [facility].    Lab Testing:  If you had lab testing today and your results are reassuring or normal they will be mailed to you or sent through [facility] within 7 days. If the lab tests need quick action we will call you with the results. The phone number we will call with results is # [phone] (home) . If this is not the best number please call our clinic and change the number.    Medication Refills:  If you need any refills please call your pharmacy and they will contact us. Our fax number for refills is [phone]. Please allow three business for refill processing. If you need to  your refill at a new pharmacy, please contact the new pharmacy directly. The new pharmacy will help you get your medications transferred.     Scheduling:  If you have any concerns about today's visit or wish to schedule another appointment please call our office during normal business hours [phone] (8-5:00 M-F)    Contact Us:  Please call [phone] during business hours (8-5:00 M-F).  If after clinic hours, or on the weekend, please call  [phone].    Financial Assistance [phone]  [facility] Billing [phone]  Central Billing Office, [facility]: [phone]  [facility] Billing [phone]  Medical Records [phone]  [place] Patient Bill of Rights [email or web address]       MENTAL HEALTH CRISIS NUMBERS:  For a medical emergency please call  911 or go to the nearest ER.     [facility]:   [facility] -[phone]   Crisis Residence [facility] Residence -[phone]   Walk-In Counseling Center [facility] -[phone]   COPE 24/7 [place] Mobile Team -[phone] (adults)/[phone] (child)  CHILD: [facility] needs assessment team -  [phone]      [facility]:   [facility] - [phone]   Walk-in counseling [facility] - [phone]   Walk-in counseling [facility] - [phone]   Crisis Residence [facility] Residence - [phone]  Urgent Care Adult Mental [place]-[phone] mobile unit/ 24/7 crisis line    National Crisis Numbers:   National Suicide Prevention Lifeline: 8-546-513-TALK (886-672-2841)  Poison Control Center - 5-086-390-8421  [place]/resources for a list of additional resources (SOS)  Trans Lifeline a hotline for transgender people 1-630-722-5187  The [employer] a hotline for LGBT youth 3-472-166-9104  Crisis Text Line: For any crisis 24/7   To: 376113  see www.crisistextline.org  - IF MAKING A CALL FEELS TOO HARD, send a text!         Again thank you for choosing [facility] MENTAL HEALTH & ADDICTION [facility] and please let us know how we can best partner with you to improve you and your family's health.    You may be receiving a survey regarding this appointment. We would love to have your feedback, both positive and negative. The survey is done by an external company, so your answers are anonymous.

## 2021-12-15 NOTE — PROGRESS NOTES
"VIDEO VISIT  Jerrod Solis is a 31year old patient who is being evaluated via a billable video visit.      The patient has been notified of following:   \"This video visit will be conducted via a call between you and your physician/provider. We have found that certain health care needs can be provided without the need for an in-person physical exam. This service lets us provide the care you need with a video conversation. If a prescription is necessary we can send it directly to your pharmacy. If lab work is needed we can place an order for that and you can then stop by our lab to have the test done at a later time. Insurers are generally covering virtual visits as they would in-office visits so billing should not be different than normal.  If for some reason you do get billed incorrectly, you should contact the billing office to correct it and that number is in the AVS .    Video Conference to be completed via:  [email or web address]    Patient has given verbal consent for video visit?:  Yes    Patient would prefer that any video invitations be sent by: Send to e-mail at: [email or web address]      How would patient like to obtain AVS?:  Muse    AVS SmartPhrase [PsychAVS] has been placed in 'Patient Instructions':  Yes     Video- Visit Details  Type of service:  video visit for medication management  Time of service:    Date:  12/15/2021    Video Start Time:  2:42p        Video End Time: 2:59p    Reason for video visit:  Patient has requested telehealth visit  Originating Site (patient location):  [facility]   Location- Patient's home  Distant Site (provider location):  Remote location  Mode of Communication:  Video Conference via [email or web address]  Consent:  Patient has given verbal consent for video visit?: Yes     Connected with Jerrod after his Dad called him.         [facility]  Psychiatry Clinic  PSYCHIATRIC PROGRESS NOTE       Jerrod Solis is a 31 year old male who prefers the name Jerrod and pronouns he, " "him, his, himself.  Therapist: sees Kaley Galdamez as needed over the last 8 years  PCP: Tatiana Juan  Other Providers:  - lives at group home [facility] (supervisor Erik [phone])  - [facility] [phone]  - lives in his own apartment, weekly meds delivered on Wed without oversight  - guardian/ Dad Lola   -  Carrington Hernandez through [place]    PREVIOUS PSYCH  MED TRIALS:  - Depakote (listed as an allergy, unknown response)  - Abilify  - olanzapine  - Wellbutrin  - lithium    Pertinent Background:  See previous notes.  Psych critical item history includes trauma hx, violent behavior and few known details re: psychiatric history    Interim History     [4, 4]     The patient is a fair historian and reports improved treatment adherence. Last seen on 11/11/2021 when he chose to continue olanzapine 2.5mg at bed, Abilify 10mg daily, Wellbutrin XL 300mg QAM, trial reducing lithium from 600mg BID to 300mg QAM and 600mg at bedtime.    Jerrod didn't answer or log on for either platform. His Dad/ guardian Lola () requested to meet briefly.    Jerrod has been naming social anxiety. Feeling more comfortable with Darrick and his Dad and brother. Went to [employer] for 60min watching wrestling. Lola heard Jerrod make a few comments about what his neighbor was seeing and thinking about Jerrod. He doesn't hear him talk about AH. Persistent paranoia.    No clear date for lithium reduction. Jerrod is \"conscious of the change but doesn't think it's changed anything\". No mood episodes. No reduction with tremor.    - his staff supervisor Darrick () was not present  - enjoys spending time with staff Paresh  - he gets meds dropped off on Tuedays and then Jerrod takes on his own    Medical meds include Prilosec, levothyroxine 50mcg    Since the last visit , he's been OK.  - he's playing basketball on video games, watching basketball and pranks on Youtube  - misses " "playing basketball, trying to use ankle weights and dumb bells to gain strength  - talking to Dad everyday  - Dad previously reported lithium has been critical for his mood and functioning since 18-19yo  - coping skills include listening to music and playing basketball  - support from his Dad/ guardian    Recent Symptoms:   Depression: he denies significant depression, irritability   Psychosis:  He denies AVH, denies persecutory delusions, hearing the thoughts of his neighbors  Anxiety: he denies, he doesn't pay attention to his \"annoying neighbors\"   Trauma Related:  \"I don't talk about that stuff, I'm a pretty strong person\"    Diagnosed with fetal alcohol syndrome as a child with reported FSIQ 46. While he does not read or write, he has reported  letters and numbers from his pills to identify what he is taking.    ADVERSE EFFECTS: he denies  MEDICAL CONCERNS: less knee pain    Recent Labs   Lab Test 02/09/21  1319 10/01/19  1316 07/25/18  1044   LITHIUM 1.31* 0.92 0.78       APPETITE: 189# in May 2021  - drinks 5-6 bottles of water, he urinates 3-4x daily and not overnight    SLEEP: taking meds at 7p, sleeping 7-8 hours overnight, identifies as a night owl; naps when he's bored     Recent Substance Use:  Caffeine- denies soda, energy drinks; he prefers milk, Gatorade    Social/ Family History      [1ea,1ea]            [per patient report]                 FINANCIAL SUPPORT- social security disability and Dad/guardian is his payee       CHILDREN- never , no kids       LIVING SITUATION- lives independently in his own apartment at his group home  LEGAL- none  EARLY HISTORY/ EDUCATION- moved from [place] to [place] at 5yo, removed from his Mom's custody and placed in foster care. Aware he has 12 siblings in [place]. He thinks he graduated high school [employer].  SOCIAL/ SPIRITUAL SUPPORT- limited social support, identifies as not Voodoo.       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     Mood disorder in conditions classified elsewhere     Oppositional defiant disorder     CARDIOVASCULAR SCREENING; LDL GOAL LESS THAN 160     Hypothyroidism     Helicobacter positive gastritis     Hypothyroidism due to medication     Hypothyroidism, unspecified type     Gastroesophageal reflux disease without esophagitis     Elevated cholesterol with elevated triglycerides     Elevated serum creatinine       Past Surgical History:   Procedure Laterality Date     BIOPSY  [place]      Medical Review of Systems         [2,10]     A comprehensive review of systems was performed and is negative other than noted in the HPI.    Aware he had a head injury playing basketball when he was younger, denies LOC. Denies seizures.    Allergy    Depakote [valproic acid] and Ibuprofen     Depakote- unspecified reaction    Current Medications        Current Outpatient Medications   Medication Sig Dispense Refill     ARIPiprazole (ABILIFY) 10 MG tablet Take 1 tablet (10 mg) by mouth daily 30 tablet 2     buPROPion (WELLBUTRIN XL) 300 MG 24 hr tablet Take 1 tablet (300 mg) by mouth every morning 30 tablet 2     levothyroxine (SYNTHROID/LEVOTHROID) 50 MCG tablet Take 1 tablet (50 mcg) by mouth every morning 90 tablet 2     lithium ER (LITHOBID) 300 MG CR tablet Take one (1) tab each morning and two (2) tabs in the evening for a total daily dose of 900mg 90 tablet 2     OLANZapine (ZYPREXA) 2.5 MG tablet Take 1 tablet (2.5 mg) by mouth At Bedtime 30 tablet 2     omeprazole (PRILOSEC) 20 MG DR capsule TAKE 1 CAPSULE BY MOUTH DAILY 30-60 MINUTES BEFORE A MEAL 90 capsule 2     Vitals         [3,  3]   There were no vitals taken for this visit.   Mental Status Exam        [9, 14 cog gs]     Alertness: alert  and oriented  Appearance: adequately groomed  Behavior/Demeanor: cooperative, pleasant and calm, with fair  eye contact   Speech: normal and regular rate and rhythm  Language: no problems  Psychomotor: restless  Mood: description consistent with euthymia  Affect: restricted and appropriate; was congruent to mood; was congruent to content  Thought Process/Associations: thought blocking  Thought Content:  Reports paranoid ideation;  Denies suicidal ideation, violent ideation, preoccupations, obsessions , phobia , magical thinking and over-valued ideas  Perception:  Reports none;  Denies auditory hallucinations, visual hallucinations, visual distortion seen as shadows , depersonalization and derealization  Insight: gaining/ maintaining insight is challenging  Judgment: adequate for safety  Cognition: (6) does  appear grossly intact; formal cognitive testing was not done  fund of knowledge: delayed  Gait/Station and/or Muscle Strength/Tone: n/a; pacing at start of assessment    Labs and Data                          Rating Scales:    N/A    PHQ9 Today:    PHQ 10/19/2016 1/12/2021   PHQ-9 Total Score 1 0   Q9: Thoughts of better off dead/self-harm past 2 weeks Not at all Not at all     ANTIPSYCHOTIC LABS    Recent Labs   Lab Test 02/09/21  1319 12/11/19  1458 10/18/19  1300 10/01/19  1316 07/25/18  1044 01/09/18  1116   GLC 92  --  106* 90   < > 96   A1C 5.6 5.5  --   --   --  5.0    < > = values in this interval not displayed.     Recent Labs   Lab Test 10/01/19  1316 07/25/18  1044 12/14/16  1335   CHOL 248* 239* 226*   TRIG 132 641* 113   * Cannot estimate LDL when triglyceride exceeds 400 mg/dL 131*   HDL 55 47 72     Recent Labs   Lab Test 02/09/21  1319 10/01/19  1316 11/13/17  1257   AST 16 13 28   ALT 29 23 35   ALKPHOS 86 92 88     Recent Labs   Lab Test 02/09/21  1319 10/01/19  1316  07/25/18  1044 10/14/15  1320 01/09/15  1309   WBC 6.2 6.4 7.1   < > 7.2   ANEU  --  2.4 3.0  --  3.6   HGB 13.7 13.7 13.9   < > 15.0    165 167   < > 202    < > = values in this interval not displayed.     Diagnosis      Impression includes unspecified mood disorder, history of verbal and physical outbursts plus ODD     Assessment      [m2, h3]     TODAY, the following items were discussed:    : 02/2021    PSYCHOTROPIC DRUG INTERACTIONS:  - ARIPIPRAZOLE  -- MIRTAZAPINE -- OLANZAPINE may result in increased risk of QT interval prolongation and ventricular arrhythmias (eg, Torsades de Pointes).   - ARIPIPRAZOLE  -- OLANZAPINE may result in increased risk of QT interval prolongation.   - ARIPIPRAZOLE  -- BUPROPION may result in increased exposure of aripiprazole.   - LITHIUM  -- MIRTAZAPINE may result in increased risk of serotonin syndrome.   - LITHIUM  -- OLANZAPINE may result in weakness, dyskinesias, increased extrapyramidal symptoms, encephalopathy, and brain damage.     Drug Interaction Management: Monitoring for adverse effects, routine vitals, routine labs, periodic EKGs, using lowest therapeutic dose of [psychotropics] and patient is aware of risks    Plan                                                                                                                     m2, h3     1) encouraged compliance, Jerrod chooses to continue olanzapine 2.5mg at bed, Abilify 10mg daily, Wellbutrin XL 300mg QAM, lithium 300mg QAM and 600mg QHS  - monitoring mood stability and tremor  - reviewed lab draw process   - Jerrod prefers a 2 or 230p visit so he can play basketball 3-7p    - previously encouraged staff to be present for the first few minutes of Jerrod's visits to protect Jerrod's independence and privacy by allowing him space to choose who/ if joins his visits.    2) sees therapist Kaley of 8 years as needed    3) monitoring labs including LDL, EKG (Feb 2021 NSR with 390 QTc; Nov 2017 RKG with borderline  rhythm and 364 QTc    RTC: 4 weeks, sooner as needed    CRISIS NUMBERS:   Provided routinely in AVS.    Treatment Risk Statement:  The patient understands the risks, benefits, adverse effects and alternatives. Agrees to treatment with the capacity to do so. No medical contraindications to treatment. Agrees to call clinic for any problems. The patient understands to call 911 or go to the nearest ED if life threatening or urgent symptoms occur.     WHODAS 2.0  TODAY total score = N/A; [a 12-item WHODAS 2.0 assessment was not completed by the pt today and/or recorded in EPIC].    PROVIDER:  TERENCE Park CNP

## 2022-01-14 ENCOUNTER — VIRTUAL VISIT (OUTPATIENT)
Dept: PSYCHIATRY | Facility: CLINIC | Age: 32
End: 2022-01-14
Attending: NURSE PRACTITIONER
Payer: COMMERCIAL

## 2022-01-14 DIAGNOSIS — F06.30 MOOD DISORDER IN CONDITIONS CLASSIFIED ELSEWHERE: ICD-10-CM

## 2022-01-14 PROCEDURE — 99213 OFFICE O/P EST LOW 20 MIN: CPT | Mod: 95 | Performed by: NURSE PRACTITIONER

## 2022-01-14 RX ORDER — BUPROPION HYDROCHLORIDE 300 MG/1
300 TABLET ORAL EVERY MORNING
Qty: 30 TABLET | Refills: 1 | Status: SHIPPED | OUTPATIENT
Start: 2022-01-14 | End: 2022-02-24

## 2022-01-14 RX ORDER — OLANZAPINE 2.5 MG/1
2.5 TABLET, FILM COATED ORAL AT BEDTIME
Qty: 30 TABLET | Refills: 1 | Status: SHIPPED | OUTPATIENT
Start: 2022-01-14 | End: 2022-02-24

## 2022-01-14 RX ORDER — ARIPIPRAZOLE 10 MG/1
10 TABLET ORAL DAILY
Qty: 30 TABLET | Refills: 1 | Status: SHIPPED | OUTPATIENT
Start: 2022-01-14 | End: 2022-02-24

## 2022-01-14 RX ORDER — LITHIUM CARBONATE 300 MG/1
TABLET, FILM COATED, EXTENDED RELEASE ORAL
Qty: 90 TABLET | Refills: 1 | Status: SHIPPED | OUTPATIENT
Start: 2022-01-14 | End: 2022-02-24

## 2022-01-14 ASSESSMENT — PAIN SCALES - GENERAL: PAINLEVEL: NO PAIN (0)

## 2022-01-14 NOTE — PATIENT INSTRUCTIONS
**For crisis resources, please see the information at the end of this document**   Patient Education    Thank you for coming to the [facility].    Lab Testing:  If you had lab testing today and your results are reassuring or normal they will be mailed to you or sent through [employer] within 7 days. If the lab tests need quick action we will call you with the results. The phone number we will call with results is # [phone] (home) . If this is not the best number please call our clinic and change the number.    Medication Refills:  If you need any refills please call your pharmacy and they will contact us. Our fax number for refills is [phone]. Please allow three business for refill processing. If you need to  your refill at a new pharmacy, please contact the new pharmacy directly. The new pharmacy will help you get your medications transferred.     Scheduling:  If you have any concerns about today's visit or wish to schedule another appointment please call our office during normal business hours [phone] (8-5:00 M-F)    Contact Us:  Please call [phone] during business hours (8-5:00 M-F).  If after clinic hours, or on the weekend, please call  [phone].    Financial Assistance [phone]  [facility] Billing [phone]  Central Billing Office, [facility]: [phone]  [facility] Billing [phone]  Medical Records [phone]  [facility] Patient Bill of Rights [email or web address]       MENTAL HEALTH CRISIS NUMBERS:  For a medical emergency please call  911 or go to the nearest ER.     [facility]:   [facility] -[phone]   Crisis Residence [facility] -[phone]   Walk-In Counseling Center [facility] -[phone]   COPE 24/7 [place] Mobile Team -[phone] (adults)/[phone] (child)  CHILD: [facility] needs assessment team -  [phone]      [facility]:   [facility] - [phone]   Walk-in counseling [facility] - [phone]   Walk-in counseling [facility] - [phone]   Crisis Residence [facility] - [phone]  Urgent Care Adult Mental Ewbzfp-[phone] mobile unit/ 24/7 crisis line    National Crisis Numbers:   National Suicide Prevention Lifeline: 9-440-848-TALK (273-098-1169)  Poison Control Center - 9-341-113-1060  YOOWALK/resources for a list of additional resources (SOS)  Trans Lifeline a hotline for transgender people 8-577-209-2915  The [employer] Project a hotline for LGBT youth 7-693-876-6446  Crisis Text Line: For any crisis 24/7   To: 039636  see www.crisistextline.org  - IF MAKING A CALL FEELS TOO HARD, send a text!         Again thank you for choosing [facility] and please let us know how we can best partner with you to improve you and your family's health.    You may be receiving a survey regarding this appointment. We would love to have your feedback, both positive and negative. The survey is done by an external company, so your answers are anonymous. '

## 2022-02-24 ENCOUNTER — VIRTUAL VISIT (OUTPATIENT)
Dept: PSYCHIATRY | Facility: CLINIC | Age: 32
End: 2022-02-24
Attending: NURSE PRACTITIONER
Payer: COMMERCIAL

## 2022-02-24 DIAGNOSIS — F06.30 MOOD DISORDER IN CONDITIONS CLASSIFIED ELSEWHERE: ICD-10-CM

## 2022-02-24 PROCEDURE — 99442 PR PHYSICIAN TELEPHONE EVALUATION 11-20 MIN: CPT | Performed by: NURSE PRACTITIONER

## 2022-02-24 RX ORDER — BUPROPION HYDROCHLORIDE 300 MG/1
300 TABLET ORAL EVERY MORNING
Qty: 30 TABLET | Refills: 1 | Status: SHIPPED | OUTPATIENT
Start: 2022-02-24 | End: 2022-04-07

## 2022-02-24 RX ORDER — OLANZAPINE 2.5 MG/1
2.5 TABLET, FILM COATED ORAL AT BEDTIME
Qty: 30 TABLET | Refills: 1 | Status: SHIPPED | OUTPATIENT
Start: 2022-02-24 | End: 2022-04-07

## 2022-02-24 RX ORDER — ARIPIPRAZOLE 10 MG/1
10 TABLET ORAL DAILY
Qty: 30 TABLET | Refills: 1 | Status: SHIPPED | OUTPATIENT
Start: 2022-02-24 | End: 2022-04-07

## 2022-02-24 RX ORDER — LITHIUM CARBONATE 300 MG/1
TABLET, FILM COATED, EXTENDED RELEASE ORAL
Qty: 90 TABLET | Refills: 1 | Status: SHIPPED | OUTPATIENT
Start: 2022-02-24 | End: 2022-04-07

## 2022-02-24 NOTE — PROGRESS NOTES
"VIDEO VISIT  Jerrod Solis is a 31year old patient who is being evaluated via a billable video visit.      The patient has been notified of following:   \"This video visit will be conducted via a call between you and your physician/provider. We have found that certain health care needs can be provided without the need for an in-person physical exam. This service lets us provide the care you need with a video conversation. If a prescription is necessary we can send it directly to your pharmacy. If lab work is needed we can place an order for that and you can then stop by our lab to have the test done at a later time. Insurers are generally covering virtual visits as they would in-office visits so billing should not be different than normal.  If for some reason you do get billed incorrectly, you should contact the billing office to correct it and that number is in the AVS .    Video Conference to be completed via:  [email or web address]    Patient has given verbal consent for video visit?:  Yes    Patient would prefer that any video invitations be sent by: Send to e-mail at: [email or web address]      How would patient like to obtain AVS?:  Bag of Ice    AVS SmartPhrase [PsychAVS] has been placed in 'Patient Instructions':  Yes     Video- Visit Details  Type of service:  video visit for medication management  Time of service:    Date:  02/24/2022    Video Start Time: 2:00p        Video End Time: 2:12p    Reason for video visit:  Patient has requested telehealth visit  Originating Site (patient location):  [facility]   Location- Patient's home  Distant Site (provider location):  Remote location  Mode of Communication:  Video Conference via [email or web address]  Consent:  Patient has given verbal consent for video visit?: Yes     Visit converted to phone.         [facility]  Psychiatry Clinic  PSYCHIATRIC PROGRESS NOTE       Jerrod Solis is a 31 year old male who prefers the name Jerrod and pronouns he, him, his, " himself.  Therapist: sees Kaley Galdamez as needed over the last 8 years  PCP: Tatiana Juan  Other Providers:  - lives at group home [facility] (supervisor Erik [phone])  - [facility] [phone]  - lives in his own apartment, weekly meds delivered on Wed without oversight  - guardian/ Dad Lola   -  Carrington Hernandez through [place]    PREVIOUS PSYCH  MED TRIALS:  - Depakote (listed as an allergy, unknown response)  - Abilify  - olanzapine  - Wellbutrin  - lithium    Pertinent Background:  See previous notes.  Psych critical item history includes trauma hx, violent behavior and few known details re: psychiatric history    Interim History     [4, 4]     The patient is a fair historian and reports improved treatment adherence. Last seen on 1/14/2022 when he chose to continue olanzapine 2.5mg at bed, Abilify 10mg daily, Wellbutrin XL 300mg QAM, lithium 300mg QAM and 600mg at bed.     Jerrod presented alone. His Dad/ guardian was not present. His staff supervisor Star () was not present    He gets meds dropped off on Tuedays and then Jerrod takes on his own    Medical meds include Prilosec, levothyroxine 50mcg    Since the last visit , he's been OK.  - taking meds consistently  - feeling stable after lithium reduction  - his SO moved in, she's expecting a child in June 2022  - feeling OK and pacing sometimes when he's anxious, especially when he has to leave for errands or basketball  - hoping he'll play basketball tonight with a friend  - his staff Paresh is moving on from a new job in March  - he got a noise complaint in writing from his landlord  - he's playing basketball on video games, watching basketball and pranks on Youtube  - talking to Dad everyday  - Dad perceives lithium has been critical for Jerrod's mood and functioning since 18-19yo  - coping skills include listening to music and playing basketball  - support from Dad/ guardian    Recent Symptoms:    Depression: he denies significant depression, irritability   Psychosis: monitoring anxiety vs paranoia, he denies AVH, denies persecutory delusions, hearing the thoughts of his neighbors  Anxiety: socially anxious talking to friends, when he leaves the house   Trauma Related: he declines to discuss    Diagnosed with fetal alcohol syndrome as a child; reported FSIQ 46. While he does not read or write, he has reported letters and numbers from his pills to identify what he is taking.    ADVERSE EFFECTS: tremor; no evidence or report of acne, weight gain, nausea, polyuria, polydipsia, dry mouth, sedation, diarrhea, apathy, cognitive impairment   MEDICAL CONCERNS: denies knee pain in winter    Recent Labs   Lab Test 02/09/21  1319 10/01/19  1316 07/25/18  1044   LITHIUM 1.31* 0.92 0.78     APPETITE: 189# in May 2021  - drinks 5-6 bottles of water, he urinates 3-4x daily and 1x overnight    SLEEP: taking meds at 7p, sleeping 7-8 hours overnight, identifies as a night owl; infrequent naps     Recent Substance Use:  Caffeine- denies soda, energy drinks; he prefers milk, Gatorade    Social/ Family History      [1ea,1ea]            [per patient report]                 FINANCIAL SUPPORT- social security disability and Dad/guardian is his payee       CHILDREN- never , no kids       LIVING SITUATION- lives independently in his own apartment at his group home  LEGAL- none  EARLY HISTORY/ EDUCATION- moved from [place] to [place] at 7yo, removed from his Mom's custody and placed in foster care. Aware he has 12 siblings in [place]. He thinks he graduated high school [employer].  SOCIAL/ SPIRITUAL SUPPORT- limited social support, identifies as not Hoahaoism.       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     Mood disorder in conditions classified elsewhere     Oppositional defiant disorder     CARDIOVASCULAR SCREENING; LDL GOAL LESS THAN 160     Hypothyroidism     Helicobacter positive gastritis     Hypothyroidism due to medication     Hypothyroidism, unspecified type     Gastroesophageal reflux disease without esophagitis     Elevated cholesterol with elevated triglycerides     Elevated serum creatinine       Past Surgical History:   Procedure Laterality Date     BIOPSY  [place]      Medical Review of Systems         [2,10]     A comprehensive review of systems was performed and is negative other than noted in the HPI.    Aware he had a head injury playing basketball when he was younger, denies LOC. Denies seizures.    Allergy    Depakote [valproic acid] and Ibuprofen     Depakote- unspecified reaction    Current Medications        Current Outpatient Medications   Medication Sig Dispense Refill     ARIPiprazole (ABILIFY) 10 MG tablet Take 1 tablet (10 mg) by mouth daily 30 tablet 1     buPROPion (WELLBUTRIN XL) 300 MG 24 hr tablet Take 1 tablet (300 mg) by mouth every morning 30 tablet 1     levothyroxine (SYNTHROID/LEVOTHROID) 50 MCG tablet Take 1 tablet (50 mcg) by mouth every morning 90 tablet 2     lithium ER (LITHOBID) 300 MG CR tablet Take one (1) tab each morning and two (2) tabs in the evening for a total daily dose of 900mg 90 tablet 1     OLANZapine (ZYPREXA) 2.5 MG tablet Take 1 tablet (2.5 mg) by mouth At Bedtime 30 tablet 1     omeprazole (PRILOSEC) 20 MG DR capsule TAKE 1 CAPSULE BY MOUTH DAILY 30-60 MINUTES BEFORE A MEAL 90 capsule 2     Vitals         [3, 3]   There were no vitals taken for this visit.   Mental Status Exam        [9, 14 cog gs]     Alertness: alert  and oriented  Appearance: phone visit  Behavior/Demeanor: cooperative, pleasant and  calm, with fair  eye contact   Speech: normal and regular rate and rhythm  Language: no problems  Psychomotor: N/A  Mood: description consistent with euthymia  Affect: restricted and appropriate; was congruent to mood; was congruent to content  Thought Process/Associations: thought blocking  Thought Content:  Reports paranoid ideation;  Denies suicidal ideation, violent ideation, preoccupations, obsessions , phobia , magical thinking and over-valued ideas  Perception:  Reports none;  Denies auditory hallucinations, visual hallucinations, visual distortion seen as shadows , depersonalization and derealization  Insight: gaining/ maintaining insight is challenging  Judgment: adequate for safety  Cognition: (6) does  appear grossly intact; formal cognitive testing was not done  fund of knowledge: delayed  Gait/Station and/or Muscle Strength/Tone: N/A    Labs and Data                          Rating Scales:    N/A    PHQ9 Today:    PHQ 10/19/2016 1/12/2021   PHQ-9 Total Score 1 0   Q9: Thoughts of better off dead/self-harm past 2 weeks Not at all Not at all     ANTIPSYCHOTIC LABS    Recent Labs   Lab Test 02/09/21  1319 12/11/19  1458 10/18/19  1300 10/01/19  1316 07/25/18  1044 01/09/18  1116   GLC 92  --  106* 90   < > 96   A1C 5.6 5.5  --   --   --  5.0    < > = values in this interval not displayed.     Recent Labs   Lab Test 10/01/19  1316 07/25/18  1044 12/14/16  1335   CHOL 248* 239* 226*   TRIG 132 641* 113   * Cannot estimate LDL when triglyceride exceeds 400 mg/dL 131*   HDL 55 47 72     Recent Labs   Lab Test 02/09/21  1319 10/01/19  1316 11/13/17  1257   AST 16 13 28   ALT 29 23 35   ALKPHOS 86 92 88     Recent Labs   Lab Test 02/09/21  1319 10/01/19  1316 07/25/18  1044 10/14/15  1320 01/09/15  1309   WBC 6.2 6.4 7.1   < > 7.2   ANEU  --  2.4 3.0  --  3.6   HGB 13.7 13.7 13.9   < > 15.0    165 167   < > 202    < > = values in this interval not displayed.     Diagnosis      Unspecified mood  disorder, FAS, history of verbal and physical outbursts plus ODD     Assessment      [m2, h3]     TODAY, the following items were discussed:    : 02/2021    PSYCHOTROPIC DRUG INTERACTIONS:  - ARIPIPRAZOLE  -- MIRTAZAPINE -- OLANZAPINE may result in increased risk of QT interval prolongation and ventricular arrhythmias (eg, [place] Pointes).   - ARIPIPRAZOLE  -- OLANZAPINE may result in increased risk of QT interval prolongation.   - ARIPIPRAZOLE  -- BUPROPION may result in increased exposure of aripiprazole.   - LITHIUM  -- MIRTAZAPINE may result in increased risk of serotonin syndrome.   - LITHIUM  -- OLANZAPINE may result in weakness, dyskinesias, increased extrapyramidal symptoms, encephalopathy, and brain damage.     Drug Interaction Management: Monitoring for adverse effects, routine vitals, routine labs, periodic EKGs, using lowest therapeutic dose of [psychotropics] and patient is aware of risks    Plan                                                                                                                     m2, h3     1) he chooses to continue olanzapine 2.5mg at bed, Abilify 10mg daily, Wellbutrin XL 300mg QAM, lithium 300mg QAM and 600mg at bed    - Spoke with Dad after visit about tremor, meds, repeat fasting labs   - monitoring tremor, tremor started as a child and lithium started as a teen  - Jerrod prefers a 2 or 230p visit so he can play basketball 3-7p    2) sees therapist Kaley of 8 years as needed  3) reviewed importance of fasting labs, being well hydrated for repeat lithium and BMP in March 2022, monitoring EKG (Feb 2021 NSR with 390 QTc; Nov 2017 RKG with borderline rhythm and 364 QTc    RTC: 6 weeks, sooner as needed    CRISIS NUMBERS:   Provided routinely in AVS.    Treatment Risk Statement:  The patient understands the risks, benefits, adverse effects and alternatives. Agrees to treatment with the capacity to do so. No medical contraindications to treatment. Agrees to call  clinic for any problems. The patient understands to call 911 or go to the nearest ED if life threatening or urgent symptoms occur.     WHODAS 2.0  TODAY total score = N/A; [a 12-item WHODAS 2.0 assessment was not completed by the pt today and/or recorded in EPIC].    PROVIDER:  TERENCE Park CNP

## 2022-02-24 NOTE — PATIENT INSTRUCTIONS
**For crisis resources, please see the information at the end of this document**   Patient Education    Thank you for coming to the [facility] [place] [facility].    Lab Testing:  If you had lab testing today and your results are reassuring or normal they will be mailed to you or sent through PlanetTran within 7 days. If the lab tests need quick action we will call you with the results. The phone number we will call with results is # [phone]. If this is not the best number please call our clinic and change the number.     Medication Refills:  If you need any refills please call your pharmacy and they will contact us. Our fax number for refills is [phone]. Please allow three business days for refill processing.   If you need to change to a different pharmacy, please contact the new pharmacy directly. The new pharmacy will help you get your medications transferred.     Contact Us:  Please call [phone] during business hours (8-5:00 M-F).  If you have medication related questions after clinic hours, or on the weekend, please call [phone].    Financial Assistance [phone]  Medical Records [phone]       MENTAL HEALTH CRISIS RESOURCES:  For a emergency help, please call 911 or go to the nearest Emergency Department.     Emergency Walk-In Options:   Em[facility] Unit @ [facility] ([place]): [phone] - Specialized mental health emergency area designed to be calming  [facility] ([place]): [phone]  [facility] ([place]): [phone]  [facility]): [phone]    County Crisis Information:   [place]: [phone]  [place]: [phone]  [place] ([place]) - Adult: [phone]     Child: [phone]  [place] - Adult: [phone]     Child: [phone]  [place]: [phone]  List of all [facility] resources:    https://[place].gov/dhs/people-we-serve/adults/health-care/mental-health/resources/crisis-contacts.jsp    National Crisis Information:   Crisis Text Line: Text  [place]  to 225257  National Suicide Prevention Lifeline: 6-522-170-TALK (1-650.744.8153)       For online chat options, visit https://suicidepreventionlifeline.org/chat/  Poison Control Center: 1-912-862-2633  Trans Lifeline: 4-640-739-7204 - Hotline for transgender people of all ages  The [place] Project: 1-407-967-8330 - Hotline for LGBT youth     For Non-Emergency Support:   Fast Tracker: Mental Health & Substance Use Disorder Resources -   https://www.Evochan.org/

## 2022-03-25 ENCOUNTER — MYC MEDICAL ADVICE (OUTPATIENT)
Dept: FAMILY MEDICINE | Facility: CLINIC | Age: 32
End: 2022-03-25
Payer: COMMERCIAL

## 2022-03-25 DIAGNOSIS — R73.01 ELEVATED FASTING GLUCOSE: ICD-10-CM

## 2022-03-25 DIAGNOSIS — Z13.220 SCREENING FOR HYPERCHOLESTEROLEMIA: Primary | ICD-10-CM

## 2022-03-28 NOTE — TELEPHONE ENCOUNTER
"\"Psychiatrist orders  \"message has to be sent to the psychiatrist office , can you make sure they do that ( place new orders ) , seems that the staff at Jerrod's residence is in communications with the psychiatrist office ( they have been instructed on how labs need to be collected ) , so they can call them to get new orders, just need to make sure they do that !  Otherwise I can place the glucose and lipid orders?  Thanks   "

## 2022-03-28 NOTE — TELEPHONE ENCOUNTER
Lab,  Are you able to do orders below?  Please advise.  Thanks,  Mary Lebron RN    Can route to  after lab response.

## 2022-03-28 NOTE — TELEPHONE ENCOUNTER
LS,  Sending this to you also.  Sent message back to person who sent original message with labs message below.  Thanks,  Mary Lebron RN      Labs message:  Mercedez Bello's orders are .We need new orders placed or Mercedez needs to extend them.

## 2022-04-01 ENCOUNTER — OFFICE VISIT (OUTPATIENT)
Dept: FAMILY MEDICINE | Facility: CLINIC | Age: 32
End: 2022-04-01
Payer: COMMERCIAL

## 2022-04-01 VITALS
DIASTOLIC BLOOD PRESSURE: 89 MMHG | WEIGHT: 198 LBS | BODY MASS INDEX: 29.67 KG/M2 | OXYGEN SATURATION: 100 % | TEMPERATURE: 97.8 F | SYSTOLIC BLOOD PRESSURE: 137 MMHG | HEART RATE: 70 BPM

## 2022-04-01 DIAGNOSIS — Z79.899 ENCOUNTER FOR LONG-TERM (CURRENT) USE OF MEDICATIONS: ICD-10-CM

## 2022-04-01 DIAGNOSIS — E03.9 ACQUIRED HYPOTHYROIDISM: Primary | ICD-10-CM

## 2022-04-01 DIAGNOSIS — F06.30 MOOD DISORDER IN CONDITIONS CLASSIFIED ELSEWHERE: ICD-10-CM

## 2022-04-01 DIAGNOSIS — F41.9 ANXIETY: ICD-10-CM

## 2022-04-01 DIAGNOSIS — Z23 ENCOUNTER FOR IMMUNIZATION: ICD-10-CM

## 2022-04-01 DIAGNOSIS — R73.01 ELEVATED FASTING GLUCOSE: ICD-10-CM

## 2022-04-01 DIAGNOSIS — Z13.220 SCREENING FOR HYPERCHOLESTEROLEMIA: ICD-10-CM

## 2022-04-01 LAB
BASOPHILS # BLD AUTO: 0.1 10E3/UL (ref 0–0.2)
BASOPHILS NFR BLD AUTO: 1 %
EOSINOPHIL # BLD AUTO: 0.3 10E3/UL (ref 0–0.7)
EOSINOPHIL NFR BLD AUTO: 6 %
ERYTHROCYTE [DISTWIDTH] IN BLOOD BY AUTOMATED COUNT: 15.1 % (ref 10–15)
HBA1C MFR BLD: 5.3 % (ref 0–5.6)
HCT VFR BLD AUTO: 42.6 % (ref 40–53)
HGB BLD-MCNC: 13.6 G/DL (ref 13.3–17.7)
LITHIUM SERPL-SCNC: 0.5 MMOL/L
LYMPHOCYTES # BLD AUTO: 2.9 10E3/UL (ref 0.8–5.3)
LYMPHOCYTES NFR BLD AUTO: 49 %
MCH RBC QN AUTO: 26.7 PG (ref 26.5–33)
MCHC RBC AUTO-ENTMCNC: 31.9 G/DL (ref 31.5–36.5)
MCV RBC AUTO: 84 FL (ref 78–100)
MONOCYTES # BLD AUTO: 0.6 10E3/UL (ref 0–1.3)
MONOCYTES NFR BLD AUTO: 9 %
NEUTROPHILS # BLD AUTO: 2 10E3/UL (ref 1.6–8.3)
NEUTROPHILS NFR BLD AUTO: 34 %
PLATELET # BLD AUTO: 161 10E3/UL (ref 150–450)
RBC # BLD AUTO: 5.09 10E6/UL (ref 4.4–5.9)
WBC # BLD AUTO: 5.8 10E3/UL (ref 4–11)

## 2022-04-01 PROCEDURE — 80061 LIPID PANEL: CPT | Performed by: FAMILY MEDICINE

## 2022-04-01 PROCEDURE — 80178 ASSAY OF LITHIUM: CPT | Performed by: FAMILY MEDICINE

## 2022-04-01 PROCEDURE — 90471 IMMUNIZATION ADMIN: CPT | Performed by: FAMILY MEDICINE

## 2022-04-01 PROCEDURE — 83036 HEMOGLOBIN GLYCOSYLATED A1C: CPT | Performed by: FAMILY MEDICINE

## 2022-04-01 PROCEDURE — 84443 ASSAY THYROID STIM HORMONE: CPT | Performed by: FAMILY MEDICINE

## 2022-04-01 PROCEDURE — 85025 COMPLETE CBC W/AUTO DIFF WBC: CPT | Performed by: FAMILY MEDICINE

## 2022-04-01 PROCEDURE — 80053 COMPREHEN METABOLIC PANEL: CPT | Performed by: FAMILY MEDICINE

## 2022-04-01 PROCEDURE — 99214 OFFICE O/P EST MOD 30 MIN: CPT | Mod: 25 | Performed by: FAMILY MEDICINE

## 2022-04-01 PROCEDURE — 90715 TDAP VACCINE 7 YRS/> IM: CPT | Performed by: FAMILY MEDICINE

## 2022-04-01 PROCEDURE — 36415 COLL VENOUS BLD VENIPUNCTURE: CPT | Performed by: FAMILY MEDICINE

## 2022-04-02 LAB
ALBUMIN SERPL-MCNC: 3.9 G/DL (ref 3.4–5)
ALP SERPL-CCNC: 90 U/L (ref 40–150)
ALT SERPL W P-5'-P-CCNC: 27 U/L (ref 0–70)
ANION GAP SERPL CALCULATED.3IONS-SCNC: 4 MMOL/L (ref 3–14)
AST SERPL W P-5'-P-CCNC: 21 U/L (ref 0–45)
BILIRUB SERPL-MCNC: 1.1 MG/DL (ref 0.2–1.3)
BUN SERPL-MCNC: 12 MG/DL (ref 7–30)
CALCIUM SERPL-MCNC: 9.7 MG/DL (ref 8.5–10.1)
CHLORIDE BLD-SCNC: 108 MMOL/L (ref 94–109)
CHOLEST SERPL-MCNC: 256 MG/DL
CO2 SERPL-SCNC: 27 MMOL/L (ref 20–32)
CREAT SERPL-MCNC: 1.58 MG/DL (ref 0.66–1.25)
FASTING STATUS PATIENT QL REPORTED: YES
GFR SERPL CREATININE-BSD FRML MDRD: 60 ML/MIN/1.73M2
GLUCOSE BLD-MCNC: 96 MG/DL (ref 70–99)
HDLC SERPL-MCNC: 60 MG/DL
LDLC SERPL CALC-MCNC: 160 MG/DL
NONHDLC SERPL-MCNC: 196 MG/DL
POTASSIUM BLD-SCNC: 3.8 MMOL/L (ref 3.4–5.3)
PROT SERPL-MCNC: 7.8 G/DL (ref 6.8–8.8)
SODIUM SERPL-SCNC: 139 MMOL/L (ref 133–144)
TRIGL SERPL-MCNC: 181 MG/DL
TSH SERPL DL<=0.005 MIU/L-ACNC: 3.04 MU/L (ref 0.4–4)

## 2022-04-07 ENCOUNTER — VIRTUAL VISIT (OUTPATIENT)
Dept: PSYCHIATRY | Facility: CLINIC | Age: 32
End: 2022-04-07
Attending: NURSE PRACTITIONER
Payer: COMMERCIAL

## 2022-04-07 ENCOUNTER — TELEPHONE (OUTPATIENT)
Dept: PSYCHIATRY | Facility: CLINIC | Age: 32
End: 2022-04-07
Payer: COMMERCIAL

## 2022-04-07 DIAGNOSIS — F06.30 MOOD DISORDER IN CONDITIONS CLASSIFIED ELSEWHERE: ICD-10-CM

## 2022-04-07 DIAGNOSIS — F06.30 MOOD DISORDER IN CONDITIONS CLASSIFIED ELSEWHERE: Primary | ICD-10-CM

## 2022-04-07 DIAGNOSIS — Z79.899 ENCOUNTER FOR LONG-TERM (CURRENT) USE OF MEDICATIONS: ICD-10-CM

## 2022-04-07 PROCEDURE — 99214 OFFICE O/P EST MOD 30 MIN: CPT | Mod: 95 | Performed by: NURSE PRACTITIONER

## 2022-04-07 RX ORDER — ARIPIPRAZOLE 10 MG/1
10 TABLET ORAL DAILY
Qty: 30 TABLET | Refills: 1 | Status: SHIPPED | OUTPATIENT
Start: 2022-04-07 | End: 2022-06-02

## 2022-04-07 RX ORDER — BUPROPION HYDROCHLORIDE 300 MG/1
300 TABLET ORAL EVERY MORNING
Qty: 30 TABLET | Refills: 1 | Status: SHIPPED | OUTPATIENT
Start: 2022-04-07 | End: 2022-06-02

## 2022-04-07 RX ORDER — OLANZAPINE 2.5 MG/1
2.5 TABLET, FILM COATED ORAL AT BEDTIME
Qty: 30 TABLET | Refills: 1 | Status: SHIPPED | OUTPATIENT
Start: 2022-04-07 | End: 2022-06-02

## 2022-04-07 RX ORDER — LITHIUM CARBONATE 300 MG/1
TABLET, FILM COATED, EXTENDED RELEASE ORAL
Qty: 90 TABLET | Refills: 1 | Status: SHIPPED | OUTPATIENT
Start: 2022-04-07 | End: 2022-06-02

## 2022-04-07 NOTE — PROGRESS NOTES
"VIDEO VISIT  Jerrod Solis is a 31year old patient who is being evaluated via a billable video visit.      The patient has been notified of following:   \"This video visit will be conducted via a call between you and your physician/provider. We have found that certain health care needs can be provided without the need for an in-person physical exam. This service lets us provide the care you need with a video conversation. If a prescription is necessary we can send it directly to your pharmacy. If lab work is needed we can place an order for that and you can then stop by our lab to have the test done at a later time. Insurers are generally covering virtual visits as they would in-office visits so billing should not be different than normal.  If for some reason you do get billed incorrectly, you should contact the billing office to correct it and that number is in the AVS .    Video Conference to be completed via:  [email or web address]    Patient has given verbal consent for video visit?:  Yes    Patient would prefer that any video invitations be sent by: Send to e-mail at: [email or web address]      How would patient like to obtain AVS?:  ideaForge    AVS SmartPhrase [PsychAVS] has been placed in 'Patient Instructions':  Yes     Video- Visit Details  Type of service:  video visit for medication management  Time of service:    Date:  04/07/2022    Video Start Time: 2:10p        Video End Time: 2:22p    Reason for video visit:  Patient has requested telehealth visit  Originating Site (patient location):  [facility]   Location- Patient's home  Distant Site (provider location):  Remote location  Mode of Communication:  Video Conference via Doxy.me  Consent:  Patient has given verbal consent for video visit?: Yes     Connected with Jerrod on phone after he didn't log on Doxy or EdgeConneX.         [facility]  Psychiatry Clinic  PSYCHIATRIC PROGRESS NOTE       Jerrod Solis is a 31 year old male who prefers the name " "Jerrod and pronouns he, him, his, himself.  Therapist: sees Kaley Westyisel as needed over the last 8 years  PCP: Tatiana Juan  Other Providers:  - lives at group home [facility] (supervisor Erik [phone])  - pharmacy is [facility] [phone]  - lives in his own apartment, weekly meds delivered on Wed without oversight  - guardian/ Dad Lola   -  Carrington Hernandez through [facility] [place]    PREVIOUS PSYCH  MED TRIALS:  - Depakote (listed as an allergy, unknown response)  - Abilify  - olanzapine  - Wellbutrin  - lithium    Pertinent Background:  See previous notes.  Psych critical item history includes trauma hx, violent behavior and few known details re: psychiatric history    Interim History     [4, 4]     The patient is a fair historian and reports improved treatment adherence. Last seen on 2/24/2022 when he chose to continue olanzapine 2.5mg at bed, Abilify 10mg daily, Wellbutrin XL 300mg QAM, lithium 300mg QAM and 600mg at bed.     Jerrod presented alone. His Dad/ guardian was not present. His staff supervisor Star () was not present.    He gets meds dropped off on Tuedays and Jerrod takes on his own.    Medical meds include Prilosec, levothyroxine 50mcg    Since the last visit , he's been OK.  - taking meds consistently  - feeling stable after lithium reduction  - he had a \"situation the other day but since then it's been going good. My girlfriend's brother was trying to fight me. We had to call the police. We let him stay here two days and I said he had to pay me rent. My Dad was here and he explained it to them\".  - his SO moved in, she's expecting a boy in June 2022  - he's looking forward to being a Dad and at the same time, nervous  - paces when he's nervous  - he's playing basketball, enjoys watching basketball and pranks on Youtube  - Dad perceives lithium has been critical for Jerrod's mood and functioning since [age]yo  - coping skills include listening to music and " playing basketball  - support from Dad/ guardian    Recent Symptoms:   Depression: he denies significant depression, irritability   Psychosis: he denies paranoia, AVH, history of persecutory delusions, hearing the thoughts of his neighbors  Anxiety: pacing, socially anxious talking to friends, when he leaves the house   Trauma Related: he declines to discuss    Diagnosed with fetal alcohol syndrome as a child; reported FSIQ 46. While he does not read or write, he has reported letters and numbers from his pills to identify what he is taking.    ADVERSE EFFECTS: tremor; no evidence or report of acne, weight gain, nausea, polyuria, polydipsia, dry mouth, sedation, diarrhea, apathy, cognitive impairment   MEDICAL CONCERNS: denies knee pain in winter    Recent Labs   Lab Test 04/01/22  1101 02/09/21  1319 10/01/19  1316   LITHIUM 0.5 1.31* 0.92     APPETITE: 198# in Apr 2022, reviewed Apr labs, he's eating more takeout when he doesn't have groceries at home  - drinks 3-5 bottles of water, he urinates 3-4x daily and 0-2x overnight    SLEEP: taking meds at 7p, sleeping 3a-12p, identifies as a night owl; infrequent naps     Recent Substance Use:  Caffeine- denies soda, energy drinks; he prefers milk, Gatorade    Social/ Family History      [1ea,1ea]            [per patient report]                 FINANCIAL SUPPORT- social security disability and Dad/guardian is his payee       CHILDREN- never , no kids       LIVING SITUATION- lives independently in his own apartment at his group home  LEGAL- none  EARLY HISTORY/ EDUCATION- moved from [place] to [place] at 5yo, removed from his Mom's custody and placed in foster care. Aware he has 12 siblings in [place]. He thinks he graduated high school [employer].  SOCIAL/ SPIRITUAL SUPPORT- limited social support, identifies as not Mu-ism.       CULTURAL INFLUENCES/ IMPACT- none         TRAUMA HISTORY- extensive, he declines to discuss  FEELS SAFE AT HOME- Yes  FAMILY HISTORY-   Unknown to Jerrod    Medical / Surgical History                                 Patient Active Problem List   Diagnosis     Disturbance of conduct     Attention deficit hyperactivity disorder (ADHD)     Traumatic shock (H)     Alcohol affecting fetus or  via placenta or breast milk     Mood disorder in conditions classified elsewhere     Oppositional defiant disorder     CARDIOVASCULAR SCREENING; LDL GOAL LESS THAN 160     Hypothyroidism     Helicobacter positive gastritis     Hypothyroidism due to medication     Hypothyroidism, unspecified type     Gastroesophageal reflux disease without esophagitis     Elevated cholesterol with elevated triglycerides     Elevated serum creatinine       Past Surgical History:   Procedure Laterality Date     BIOPSY  [place]      Medical Review of Systems         [2,10]     A comprehensive review of systems was performed and is negative other than noted in the HPI.    Aware he had a head injury playing basketball when he was younger, denies LOC. Denies seizures.    Allergy    Depakote [valproic acid] and Ibuprofen     Depakote- unspecified reaction    Current Medications        Current Outpatient Medications   Medication Sig Dispense Refill     ARIPiprazole (ABILIFY) 10 MG tablet Take 1 tablet (10 mg) by mouth daily 30 tablet 1     buPROPion (WELLBUTRIN XL) 300 MG 24 hr tablet Take 1 tablet (300 mg) by mouth every morning 30 tablet 1     levothyroxine (SYNTHROID/LEVOTHROID) 50 MCG tablet Take 1 tablet (50 mcg) by mouth every morning 90 tablet 2     lithium ER (LITHOBID) 300 MG CR tablet Take one (1) tab each morning and two (2) tabs in the evening for a total daily dose of 900mg 90 tablet 1     OLANZapine (ZYPREXA) 2.5 MG tablet Take 1 tablet (2.5 mg) by mouth At Bedtime 30 tablet 1     omeprazole (PRILOSEC) 20 MG DR capsule TAKE 1 CAPSULE BY MOUTH DAILY 30-60 MINUTES BEFORE A MEAL 90 capsule 2     Vitals         [3, 3]   There were no vitals taken for this visit.   Mental  Status Exam        [9, 14 cog gs]     Alertness: alert  and oriented  Appearance: phone visit  Behavior/Demeanor: cooperative, pleasant and calm, with fair  eye contact   Speech: normal and regular rate and rhythm  Language: no problems  Psychomotor: N/A, reports he's pacing during phone call  Mood: description consistent with euthymia  Affect: restricted and appropriate; was congruent to mood; was congruent to content  Thought Process/Associations: thought blocking  Thought Content:  Reports paranoid ideation;  Denies suicidal ideation, violent ideation, preoccupations, obsessions , phobia , magical thinking and over-valued ideas  Perception:  Reports none;  Denies auditory hallucinations, visual hallucinations, visual distortion seen as shadows , depersonalization and derealization  Insight: gaining/ maintaining insight is challenging  Judgment: adequate for safety  Cognition: (6) does  appear grossly intact; formal cognitive testing was not done  fund of knowledge: delayed  Gait/Station and/or Muscle Strength/Tone: N/A    Labs and Data                          Rating Scales:    N/A    PHQ9 Today:    PHQ 10/19/2016 1/12/2021   PHQ-9 Total Score 1 0   Q9: Thoughts of better off dead/self-harm past 2 weeks Not at all Not at all     ANTIPSYCHOTIC LABS    Recent Labs   Lab Test 04/01/22  1101 02/09/21  1319 12/11/19  1458 10/18/19  1300   GLC 96 92  --  106*   A1C 5.3 5.6 5.5  --      Recent Labs   Lab Test 04/01/22  1101 10/01/19  1316 07/25/18  1044   CHOL 256* 248* 239*   TRIG 181* 132 641*   * 167* Cannot estimate LDL when triglyceride exceeds 400 mg/dL   HDL 60 55 47     Recent Labs   Lab Test 04/01/22  1101 02/09/21  1319 10/01/19  1316   AST 21 16 13   ALT 27 29 23   ALKPHOS 90 86 92     Recent Labs   Lab Test 04/01/22  1101 02/09/21  1319 10/01/19  1316 07/25/18  1044 10/14/15  1320 01/09/15  1309   WBC 5.8 6.2 6.4 7.1   < > 7.2   ANEU  --   --  2.4 3.0  --  3.6   HGB 13.6 13.7 13.7 13.9   < > 15.0     152 165 167   < > 202    < > = values in this interval not displayed.     Diagnosis      Unspecified mood disorder, FAS, history of verbal and physical outbursts plus ODD     Assessment      [m2, h3]     TODAY, the following items were discussed:    : 02/2021    PSYCHOTROPIC DRUG INTERACTIONS:  - ARIPIPRAZOLE  -- MIRTAZAPINE -- OLANZAPINE may result in increased risk of QT interval prolongation and ventricular arrhythmias (eg, Torsades de Pointes).   - ARIPIPRAZOLE  -- OLANZAPINE may result in increased risk of QT interval prolongation.   - ARIPIPRAZOLE  -- BUPROPION may result in increased exposure of aripiprazole.   - LITHIUM  -- MIRTAZAPINE may result in increased risk of serotonin syndrome.   - LITHIUM  -- OLANZAPINE may result in weakness, dyskinesias, increased extrapyramidal symptoms, encephalopathy, and brain damage.     Drug Interaction Management: Monitoring for adverse effects, routine vitals, routine labs, periodic EKGs, using lowest therapeutic dose of [psychotropics] and patient is aware of risks    Plan                                                                                                                     m2, h3     1) he chooses to continue olanzapine 2.5mg at bed, Abilify 10mg daily, Wellbutrin XL 300mg QAM, lithium 300mg QAM and 600mg at bed    - monitoring tremor, tremor started as a child and lithium started as a teen  - Jerrod prefers a 2 or 230p visit so he can play basketball 3-7p    2) sees therapist Kaley of 8 years as needed  3) reviewed labs with Jerrod, writer will call Lola; PCP encouraged Jerrod to be well hydrated for repeat lithium and BMP in March 2022, monitoring EKG (Feb 2021 NSR with 390 QTc; Nov 2017 RKG with borderline rhythm and 364 QTc    RTC: 6 weeks, sooner as needed    CRISIS NUMBERS:   Provided routinely in AVS.    Treatment Risk Statement:  The patient understands the risks, benefits, adverse effects and alternatives. Agrees to treatment with the  capacity to do so. No medical contraindications to treatment. Agrees to call clinic for any problems. The patient understands to call 911 or go to the nearest ED if life threatening or urgent symptoms occur.     WHODAS 2.0  TODAY total score = N/A; [a 12-item WHODAS 2.0 assessment was not completed by the pt today and/or recorded in EPIC].    PROVIDER:  TERENCE Park CNP

## 2022-04-07 NOTE — PATIENT INSTRUCTIONS
**For crisis resources, please see the information at the end of this document**   Patient Education    Thank you for coming to the [facility] MENTAL HEALTH & ADDICTION [place] CLINIC.    Lab Testing:  If you had lab testing today and your results are reassuring or normal they will be mailed to you or sent through Redbooth within 7 days. If the lab tests need quick action we will call you with the results. The phone number we will call with results is # [phone]. If this is not the best number please call our clinic and change the number.     Medication Refills:  If you need any refills please call your pharmacy and they will contact us. Our fax number for refills is [phone]. Please allow three business days for refill processing.   If you need to change to a different pharmacy, please contact the new pharmacy directly. The new pharmacy will help you get your medications transferred.     Contact Us:  Please call [phone] during business hours (8-5:00 M-F).  If you have medication related questions after clinic hours, or on the weekend, please call [phone].    Financial Assistance [phone]  Medical Records [phone]       MENTAL HEALTH CRISIS RESOURCES:  For a emergency help, please call 911 or go to the nearest Emergency Department.     Emergency Walk-In Options:   EmPATH Unit @ [facility] ([place]): [phone] - Specialized mental health emergency area designed to be calming  [facility] ([place]): [phone]  [facility] Acute Psychiat[facility] Services ([place]): [phone]  [facility]): [phone]    County Crisis Information:   [place]: [phone]  [place]: [phone]  [place] ([place]) - Adult: [phone]     Child: [phone]  [place] - Adult: [phone]     Child: [phone]  [place]: [phone]  List of all [facility] resources:    https://[place].gov/dhs/people-we-serve/adults/health-care/mental-health/resources/crisis-contacts.jsp    National Crisis Information:   Crisis Text Line: Text  [place]  to 403225  National Suicide Prevention Lifeline: 7-967-747-TALK (1-186.154.7639)       For online chat options, visit https://suicidepreventionlifeline.org/chat/  Poison Control Center: 2-655-755-0768  Trans Lifeline: 3-238-546-0839 - Hotline for transgender people of all ages  The Bonifacio Project: 9-656-369-6069 - Hotline for LGBT youth     For Non-Emergency Support:   Fast Tracker: Mental Health & Substance Use Disorder Resources -   https://www.[employer].org/

## 2022-04-07 NOTE — TELEPHONE ENCOUNTER
Spoke with Dad/ guardian Lola. Lola notes that Jerrod has been doing pretty good. Jerrod had some frustration last week directed at Lola but was able to redirect into healthy communication. Lola is worried about Jerrod and pregnant SO. Jerrod restarted [employer] (basketball) which offers good social contact. Jerrod is messy at home, struggling after Paresh left. Some contact with a previous staff member Darrel, rafael and Lola will see him for dinner this weekend. F notes Jerrod tends to be concrete. Jerrod is telling Dad he's more anxious at crowded restaurants as well as grocery stores.    Reviewed recent labs. Lola confirms he does not use ibuprofen (this is listed as an allergy with Depakote). He shared frustration about recent lab ordering process. Discussed options, risks, benefits including potential lithium decrease to 750mg, UA monitoring, potential referral to nephrology to determine renal risks with ongoing lithium use, pattern of elevated creatinine levels for an  with onset of reduced GFR. Today, Lola chooses to proceed with UA then discuss results.

## 2022-04-30 ENCOUNTER — HEALTH MAINTENANCE LETTER (OUTPATIENT)
Age: 32
End: 2022-04-30

## 2022-05-19 ENCOUNTER — VIRTUAL VISIT (OUTPATIENT)
Dept: PSYCHIATRY | Facility: CLINIC | Age: 32
End: 2022-05-19
Attending: NURSE PRACTITIONER
Payer: COMMERCIAL

## 2022-05-19 DIAGNOSIS — Z53.9 ERRONEOUS ENCOUNTER--DISREGARD: Primary | ICD-10-CM

## 2022-05-19 NOTE — PROGRESS NOTES
Spoke with Dad/ guardian, Lola after Jerrod didn't answer the phone or log online. Lola sees that Jerrod is doing well. Jerrod is handling the later stage pregnancy of his SO fairly well. He's been support seeking as needed. They will message to reschedule when they're ready.      This encounter was opened in error. Please disregard.

## 2022-05-19 NOTE — PATIENT INSTRUCTIONS
**For crisis resources, please see the information at the end of this document**   Patient Education    Thank you for coming to the [facility] MENTAL HEALTH & ADDICTION [place] CLINIC.    Lab Testing:  If you had lab testing today and your results are reassuring or normal they will be mailed to you or sent through Fuzz within 7 days. If the lab tests need quick action we will call you with the results. The phone number we will call with results is # [phone]. If this is not the best number please call our clinic and change the number.     Medication Refills:  If you need any refills please call your pharmacy and they will contact us. Our fax number for refills is [phone].   Three business days of notice are needed for general medication refill requests.   Five business days of notice are needed for controlled substance refill requests.   If you need to change to a different pharmacy, please contact the new pharmacy directly. The new pharmacy will help you get your medications transferred.     Contact Us:  Please call [phone] during business hours (8-5:00 M-F).  If you have medication related questions after clinic hours, or on the weekend, please call [phone].    Financial Assistance [phone]  Medical Records [phone]       MENTAL HEALTH CRISIS RESOURCES:  For a emergency help, please call 911 or go to the nearest Emergency Department.     Emergency Walk-In Options:   EmPATH Unit @ [facility] ([place]): [phone] - Specialized mental health emergency area designed to be calming  [facility] ([place]): [phone]  [facility] Acute Psychiatry Services ([place]): [phone]  [facility]): [phone]    [facility] Crisis Information:   [place]: [phone]  [place]: [phone]  [place] ([place]) - Adult: [phone]     Child: [phone]  [place] - Adult: [phone]     Child: [phone]  [place]:  [phone]  List of all [facility] resources:   https://[place].gov/dhs/people-we-serve/adults/health-care/mental-health/resources/crisis-contacts.jsp    National Crisis Information:   Crisis Text [place]: Text  [place]  to 163281  National Suicide Prevention Lifeline: 1-447-416-TALK (1-636.333.9917)       For online chat options, visit https://suicidepreventionlifeline.org/chat/  Poison Control Center: 0-148-362-1470  Trans Lifeline: 6-982-488-9622 - Hotline for transgender people of all ages  The Bonifacio Project: 6-041-596-3359 - Hotline for LGBT youth     For Non-Emergency Support:   Fast Tracker: Mental Health & Substance Use Disorder Resources -   https://www.[employer].org/

## 2022-06-02 DIAGNOSIS — F06.30 MOOD DISORDER IN CONDITIONS CLASSIFIED ELSEWHERE: ICD-10-CM

## 2022-06-02 NOTE — TELEPHONE ENCOUNTER
ARIPiprazole (ABILIFY) 10 MG  buPROPion (WELLBUTRIN XL) 300 MG  OLANZapine (ZYPREXA) 2.5 MG  Last refilled: 4/7/22  Qty: 30 : 1    lithium ER (LITHOBID) 300 MG   Last refilled: 4/7/22  Qty: 90 : 1    Last seen: 4/7/22  RTC: 6 WEEKS  Cancel: 5/19/22  No-show: 0  Next appt: NONE  Scheduling has been notified to contact the pt for appointment.  Refill pended and routed to the provider for review/determination due to Pt outside of RTC timeframe WITH CANCEL X 1

## 2022-06-03 RX ORDER — OLANZAPINE 2.5 MG/1
TABLET, FILM COATED ORAL
Qty: 30 TABLET | Refills: 0 | Status: SHIPPED | OUTPATIENT
Start: 2022-06-03 | End: 2022-07-14

## 2022-06-03 RX ORDER — BUPROPION HYDROCHLORIDE 300 MG/1
TABLET ORAL
Qty: 30 TABLET | Refills: 0 | Status: SHIPPED | OUTPATIENT
Start: 2022-06-03 | End: 2022-07-14

## 2022-06-03 RX ORDER — LITHIUM CARBONATE 300 MG/1
TABLET, FILM COATED, EXTENDED RELEASE ORAL
Qty: 90 TABLET | Refills: 0 | Status: SHIPPED | OUTPATIENT
Start: 2022-06-03 | End: 2022-07-14

## 2022-06-03 RX ORDER — ARIPIPRAZOLE 10 MG/1
TABLET ORAL
Qty: 30 TABLET | Refills: 0 | Status: SHIPPED | OUTPATIENT
Start: 2022-06-03 | End: 2022-07-14

## 2022-07-13 DIAGNOSIS — E03.9 HYPOTHYROIDISM, UNSPECIFIED TYPE: ICD-10-CM

## 2022-07-13 DIAGNOSIS — K21.9 GASTROESOPHAGEAL REFLUX DISEASE WITHOUT ESOPHAGITIS: ICD-10-CM

## 2022-07-14 ENCOUNTER — VIRTUAL VISIT (OUTPATIENT)
Dept: PSYCHIATRY | Facility: CLINIC | Age: 32
End: 2022-07-14
Attending: NURSE PRACTITIONER
Payer: COMMERCIAL

## 2022-07-14 DIAGNOSIS — F06.30 MOOD DISORDER IN CONDITIONS CLASSIFIED ELSEWHERE: ICD-10-CM

## 2022-07-14 DIAGNOSIS — Z79.899 ENCOUNTER FOR LONG-TERM (CURRENT) USE OF MEDICATIONS: Primary | ICD-10-CM

## 2022-07-14 PROCEDURE — 99442 PR PHYSICIAN TELEPHONE EVALUATION 11-20 MIN: CPT | Performed by: NURSE PRACTITIONER

## 2022-07-14 RX ORDER — ARIPIPRAZOLE 10 MG/1
10 TABLET ORAL DAILY
Qty: 30 TABLET | Refills: 2 | Status: SHIPPED | OUTPATIENT
Start: 2022-07-14 | End: 2022-10-13

## 2022-07-14 RX ORDER — OLANZAPINE 2.5 MG/1
2.5 TABLET, FILM COATED ORAL AT BEDTIME
Qty: 30 TABLET | Refills: 2 | Status: SHIPPED | OUTPATIENT
Start: 2022-07-14 | End: 2022-10-13

## 2022-07-14 RX ORDER — LITHIUM CARBONATE 300 MG/1
TABLET, FILM COATED, EXTENDED RELEASE ORAL
Qty: 90 TABLET | Refills: 2 | Status: SHIPPED | OUTPATIENT
Start: 2022-07-14 | End: 2022-10-13

## 2022-07-14 RX ORDER — BUPROPION HYDROCHLORIDE 300 MG/1
300 TABLET ORAL EVERY MORNING
Qty: 30 TABLET | Refills: 2 | Status: SHIPPED | OUTPATIENT
Start: 2022-07-14 | End: 2022-10-13

## 2022-07-14 ASSESSMENT — PATIENT HEALTH QUESTIONNAIRE - PHQ9
10. IF YOU CHECKED OFF ANY PROBLEMS, HOW DIFFICULT HAVE THESE PROBLEMS MADE IT FOR YOU TO DO YOUR WORK, TAKE CARE OF THINGS AT HOME, OR GET ALONG WITH OTHER PEOPLE: SOMEWHAT DIFFICULT
SUM OF ALL RESPONSES TO PHQ QUESTIONS 1-9: 3
SUM OF ALL RESPONSES TO PHQ QUESTIONS 1-9: 3

## 2022-07-14 NOTE — PATIENT INSTRUCTIONS
**For crisis resources, please see the information at the end of this document**   Patient Education    Thank you for coming to the [facility] MENTAL HEALTH & ADDICTION [place] CLINIC.     Lab Testing:  If you had lab testing today and your results are reassuring or normal they will be mailed to you or sent through [employer] within 7 days. If the lab tests need quick action we will call you with the results. The phone number we will call with results is # [phone]. If this is not the best number please call our clinic and change the number.     Medication Refills:  If you need any refills please call your pharmacy and they will contact us. Our fax number for refills is [phone].   Three business days of notice are needed for general medication refill requests.   Five business days of notice are needed for controlled substance refill requests.   If you need to change to a different pharmacy, please contact the new pharmacy directly. The new pharmacy will help you get your medications transferred.     Contact Us:  Please call [phone] during business hours (8-5:00 M-F).   If you have medication related questions after clinic hours, or on the weekend, please call [phone].     Financial Assistance [phone]   Medical Records [phone]       MENTAL HEALTH CRISIS RESOURCES:  For a emergency help, please call 911 or go to the nearest Emergency Department.     Emergency Walk-In Options:   EmPATH Unit @ [place] [facility] ([place]): [phone] - Specialized mental health emergency area designed to be calming  [facility] ([place]): [phone]  [facility] Psychiat[facility] ([place]): [phone]  [facility]): [phone]    [facility] Crisis Information:   [place]: [phone]  [place]: [phone]  [place] ([place]) - Adult: [phone]     Child: [phone]  [place] - Adult: [phone]     Child: [phone]  [place]:  [phone]  List of all [facility] resources:   https://[place].gov/dhs/people-we-serve/adults/health-care/mental-health/resources/crisis-contacts.jsp    National Crisis Information:   Crisis Text [place]: Text  [place]  to 695012  National Suicide Prevention Lifeline: 5-123-732-TALK (1-145.869.5725)       For online chat options, visit https://suicidepreventionlifeline.org/chat/  Poison Control Center: 7-554-736-2663  Trans Lifeline: 6-274-482-5026 - Hotline for transgender people of all ages  The Bonifacio Project: 3-569-537-7325 - Hotline for LGBT youth     For Non-Emergency Support:   Fast Tracker: Mental Health & Substance Use Disorder Resources -   https://www.[employer].org/

## 2022-07-14 NOTE — PROGRESS NOTES
"VIDEO VISIT  Jerrod Solis is a 31 year old patient who is being evaluated via a billable video visit.      The patient has been notified of following:   \"This video visit will be conducted via a call between you and your physician/provider. We have found that certain health care needs can be provided without the need for an in-person physical exam. This service lets us provide the care you need with a video conversation. If a prescription is necessary we can send it directly to your pharmacy. If lab work is needed we can place an order for that and you can then stop by our lab to have the test done at a later time. Insurers are generally covering virtual visits as they would in-office visits so billing should not be different than normal.  If for some reason you do get billed incorrectly, you should contact the billing office to correct it and that number is in the AVS .    Video Conference to be completed via:  [email or web address]    Patient has given verbal consent for video visit?:  Yes    Patient would prefer that any video invitations be sent by: Send to e-mail at: [email or web address]      How would patient like to obtain AVS?:  Onformonics    AVS SmartPhrase [PsychAVS] has been placed in 'Patient Instructions':  Yes     Video- Visit Details  Type of service:  video visit for medication management  Time of service:    Date:  07/14/2022    Video Start Time: 2:13p        Video End Time: 2:33p    Reason for video visit:  Patient has requested telehealth visit  Originating Site (patient location):  [facility]   Location- Patient's home  Distant Site (provider location):  Remote location  Mode of Communication:  Video Conference via [email or web address]  Consent:  Patient has given verbal consent for video visit?: Yes     Visit converted to phone.       [facility]  Psychiatry Clinic  PSYCHIATRIC PROGRESS NOTE       Jerrod Solis is a 31 year old male who prefers the name Jerrod and pronouns he, him, his, " "himself.  Therapist: sees Kaley Galdamez as needed over the last 8 years  PCP: Tatiana Juan  Other Providers:  - lives at group home [facility] (supervisor Erik [phone])  - pharmacy is "[facility] [phone]  - lives in his own apartment, weekly meds delivered on Wed without oversight  - guardian/ Dad Lola   -  Carrington Hernandez through [place]    PREVIOUS PSYCH  MED TRIALS:  - Depakote (listed as an allergy, unknown response)  - Abilify  - olanzapine  - Wellbutrin  - lithium    Pertinent Background:  See previous notes.  Psych critical item history includes trauma hx, violent behavior and few known details re: psychiatric history    Interim History     [4, 4]     The patient is a fair historian and reports improved treatment adherence.    Last seen on 4/07/2022 when he chose to continue continue olanzapine 2.5mg at bed, Abilify 10mg daily, Wellbutrin XL 300mg QAM, lithium 300mg QAM and 600mg at bed.    Jerrod presented alone. His Dad/ guardian Lola was not present. His staff supervisor Star () was not present.    He gets meds dropped off on Tuedays and Jerrod takes on his own.    Medical meds include Prilosec, levothyroxine 50mcg    Since the last visit , he's been OK.  - taking meds consistently, notices he's less nervous  - feeling stable after lithium reduction  - he's been playing a lot of basketball, today with her brother  - fewer problems with his upstairs neighbors who \"bang on the floor a lot\"  - his exSO moved out after destroying some of Jerrod's property, she was never actually pregnant  - he enjoys playing basketball the most and enjoys watching basketball and pranks on Youtube  - coping skills include listening to music and playing basketball  - support from Dad/ guardian, they talk daily    Recent Symptoms:   Depression: he denies significant depression, irritability   Psychosis: he denies paranoia, AVH, history of persecutory delusions, hearing the " thoughts of his neighbors  Anxiety: pacing, less socially anxious or nervous leaving the house    Trauma Related: he declines to discuss    Diagnosed with fetal alcohol syndrome as a child; reported FSIQ 46. While he does not read or write, he has reported letters and numbers from his pills to identify what he is taking.    ADVERSE EFFECTS: tremor; no evidence or report of acne, weight gain, nausea, polyuria, polydipsia, dry mouth, sedation, diarrhea, apathy, cognitive impairment   MEDICAL CONCERNS: denies knee pain in winter    Recent Labs   Lab Test 22  1101 21  1319 10/01/19  1316   LITHIUM 0.5 1.31* 0.92     APPETITE: 198# in 2022, trying to keep groceries at home, might get takeout   - drinks 60-90oz water, he urinates 3-4x daily and 0-2x overnight    SLEEP: with 7p meds, sleeping 12a-7a, identifies as a night owl; rare naps     Recent Substance Use:  Caffeine- occasional soda, denies energy drinks; he prefers milk, Gatorade    Social/ Family History      [1ea,1ea]            [per patient report]                 FINANCIAL SUPPORT- social security disability and Dad/guardian is his payee       CHILDREN- never , no kids       LIVING SITUATION- lives independently in his own apartment at his group home  LEGAL- none  EARLY HISTORY/ EDUCATION- moved from [place] to [place] at 5yo, removed from his Mom's custody and placed in foster care. Aware he has 12 siblings in [place]. He thinks he graduated high school [employer].  SOCIAL/ SPIRITUAL SUPPORT- limited social support, identifies as not Restorationism.       CULTURAL INFLUENCES/ IMPACT- none         TRAUMA HISTORY- extensive, he declines to discuss  FEELS SAFE AT HOME- Yes  FAMILY HISTORY-  Unknown to Jerrod    Medical / Surgical History                                 Patient Active Problem List   Diagnosis     Disturbance of conduct     Attention deficit hyperactivity disorder (ADHD)     Traumatic shock (H)     Alcohol affecting fetus or  via  placenta or breast milk     Mood disorder in conditions classified elsewhere     Oppositional defiant disorder     CARDIOVASCULAR SCREENING; LDL GOAL LESS THAN 160     Hypothyroidism     Helicobacter positive gastritis     Hypothyroidism due to medication     Hypothyroidism, unspecified type     Gastroesophageal reflux disease without esophagitis     Elevated cholesterol with elevated triglycerides     Elevated serum creatinine       Past Surgical History:   Procedure Laterality Date     BIOPSY  [place]      Medical Review of Systems         [2,10]     A comprehensive review of systems was performed and is negative other than noted in the HPI.    Aware he had a head injury playing basketball when he was younger, denies LOC. Denies seizures.    Allergy    Depakote [valproic acid] and Ibuprofen     Depakote- unspecified reaction    Current Medications        Current Outpatient Medications   Medication Sig Dispense Refill     ARIPiprazole (ABILIFY) 10 MG tablet TAKE 1 TABLET BY MOUTH ONCE DAILY 30 tablet 0     buPROPion (WELLBUTRIN XL) 300 MG 24 hr tablet TAKE 1 TABLET BY MOUTH EVERY MORNING 30 tablet 0     levothyroxine (SYNTHROID/LEVOTHROID) 50 MCG tablet Take 1 tablet (50 mcg) by mouth every morning 90 tablet 2     lithium ER (LITHOBID) 300 MG CR tablet TAKE 1 TABLET BY MOUTH EVERY MORNING;TAKE 2 TABLETS (600MG) BY MOUTH EVERY EVENING. TOTAL DAILY DOSE 900MG 90 tablet 0     OLANZapine (ZYPREXA) 2.5 MG tablet TAKE 1 TABLET BY MOUTH AT BEDTIME 30 tablet 0     omeprazole (PRILOSEC) 20 MG DR capsule TAKE 1 CAPSULE BY MOUTH DAILY 30-60 MINUTES BEFORE A MEAL 90 capsule 2     Vitals         [3, 3]   There were no vitals taken for this visit.   Mental Status Exam        [9, 14 cog gs]     Alertness: alert  and oriented  Appearance: phone visit  Behavior/Demeanor: cooperative, pleasant and calm, with N/A eye contact   Speech: normal and regular rate and rhythm  Language: no problems  Psychomotor: N/A, no pacing during  visit  Mood: description consistent with euthymia  Affect: restricted and appropriate; was congruent to mood; was congruent to content  Thought Process/Associations: thought blocking  Thought Content:  Reports paranoid ideation;  Denies suicidal ideation, violent ideation, preoccupations, obsessions , phobia , magical thinking and over-valued ideas  Perception:  Reports none;  Denies auditory hallucinations, visual hallucinations, visual distortion seen as shadows , depersonalization and derealization  Insight: gaining/ maintaining insight is challenging  Judgment: adequate for safety  Cognition: (6) does  appear grossly intact; formal cognitive testing was not done  fund of knowledge: delayed  Gait/Station and/or Muscle Strength/Tone: N/A    Labs and Data                          Rating Scales:      Answers for HPI/ROS submitted by the patient on 7/14/2022  If you checked off any problems, how difficult have these problems made it for you to do your work, take care of things at home, or get along with other people?: Somewhat difficult  PHQ9 TOTAL SCORE: 3    PHQ9 Today:    PHQ 10/19/2016 1/12/2021 7/14/2022   PHQ-9 Total Score 1 0 3   Q9: Thoughts of better off dead/self-harm past 2 weeks Not at all Not at all Not at all     ANTIPSYCHOTIC LABS    Recent Labs   Lab Test 04/01/22  1101 02/09/21  1319 12/11/19  1458 10/18/19  1300   GLC 96 92  --  106*   A1C 5.3 5.6 5.5  --      Recent Labs   Lab Test 04/01/22  1101 10/01/19  1316 07/25/18  1044   CHOL 256* 248* 239*   TRIG 181* 132 641*   * 167* Cannot estimate LDL when triglyceride exceeds 400 mg/dL   HDL 60 55 47     Recent Labs   Lab Test 04/01/22  1101 02/09/21  1319 10/01/19  1316   AST 21 16 13   ALT 27 29 23   ALKPHOS 90 86 92     Recent Labs   Lab Test 04/01/22  1101 02/09/21  1319 10/01/19  1316 07/25/18  1044 10/14/15  1320 01/09/15  1309   WBC 5.8 6.2 6.4 7.1   < > 7.2   ANEU  --   --  2.4 3.0  --  3.6   HGB 13.6 13.7 13.7 13.9   < > 15.0     152 165 167   < > 202    < > = values in this interval not displayed.     Diagnosis      Unspecified mood disorder, FAS, history of verbal and physical outbursts plus ODD     Assessment      [m2, h3]     TODAY, the following items were discussed:    : 02/2021    PSYCHOTROPIC DRUG INTERACTIONS:  - ARIPIPRAZOLE  -- OLANZAPINE may result in increased risk of QT interval prolongation and ventricular arrhythmias (eg, [place]).   - ARIPIPRAZOLE  -- OLANZAPINE may result in increased risk of QT interval prolongation.   - ARIPIPRAZOLE  -- BUPROPION may result in increased exposure of aripiprazole.   - LITHIUM  -- OLANZAPINE may result in weakness, dyskinesias, increased extrapyramidal symptoms, encephalopathy, and brain damage.     Drug Interaction Management: Monitoring for adverse effects, routine vitals, routine labs, periodic EKGs, using lowest therapeutic dose of psychotropics and patient is aware of risks    Plan                                                                                                                     m2, h3     1) he chooses to continue olanzapine 2.5mg at bed, Abilify 10mg daily, Wellbutrin XL 300mg QAM, lithium 300mg QAM and 600mg at bed    - spoke with guardian/ Dad Lola after visit to review progress, plan for labs  - monitoring tremor, tremor started as a child, lithium started as a teen  - Jerrod prefers a 2 or 230p visit so he can play basketball 3-7p    2) sees therapist Kaley of 8 years as needed  3) monitoring labs, he's seeing PCP next week, monitoring EKG (Feb 2021 NSR with 390 QTc; Nov 2017 RKG borderline rhythm, 364 QTc    RTC: 12 weeks, sooner as needed    CRISIS NUMBERS:   Provided routinely in AVS.    Treatment Risk Statement:  The patient understands the risks, benefits, adverse effects and alternatives. Agrees to treatment with the capacity to do so. No medical contraindications to treatment. Agrees to call clinic for any problems. The patient understands  to call 911 or go to the nearest ED if life threatening or urgent symptoms occur.     WHODAS 2.0  TODAY total score = N/A; [a 12-item WHODAS 2.0 assessment was not completed by the pt today and/or recorded in EPIC].    PROVIDER:  TERENCE Park CNP

## 2022-07-15 RX ORDER — LEVOTHYROXINE SODIUM 50 UG/1
TABLET ORAL
Qty: 30 TABLET | Refills: 2 | Status: SHIPPED | OUTPATIENT
Start: 2022-07-15 | End: 2022-10-10

## 2022-07-15 NOTE — TELEPHONE ENCOUNTER
Routing refill request to provider for review/approval because:  A break in medication  Both medications would have been completed in 2021 or earlier per chart   Pt is scheduled in Sept.  Mary Lebron RN

## 2022-10-12 ASSESSMENT — PATIENT HEALTH QUESTIONNAIRE - PHQ9
10. IF YOU CHECKED OFF ANY PROBLEMS, HOW DIFFICULT HAVE THESE PROBLEMS MADE IT FOR YOU TO DO YOUR WORK, TAKE CARE OF THINGS AT HOME, OR GET ALONG WITH OTHER PEOPLE: SOMEWHAT DIFFICULT
SUM OF ALL RESPONSES TO PHQ QUESTIONS 1-9: 5
SUM OF ALL RESPONSES TO PHQ QUESTIONS 1-9: 5

## 2022-10-13 ENCOUNTER — VIRTUAL VISIT (OUTPATIENT)
Dept: PSYCHIATRY | Facility: CLINIC | Age: 32
End: 2022-10-13
Attending: NURSE PRACTITIONER
Payer: COMMERCIAL

## 2022-10-13 DIAGNOSIS — Z79.899 ENCOUNTER FOR LONG-TERM (CURRENT) USE OF MEDICATIONS: Primary | ICD-10-CM

## 2022-10-13 DIAGNOSIS — F06.30 MOOD DISORDER IN CONDITIONS CLASSIFIED ELSEWHERE: ICD-10-CM

## 2022-10-13 PROCEDURE — 99213 OFFICE O/P EST LOW 20 MIN: CPT | Mod: 95 | Performed by: NURSE PRACTITIONER

## 2022-10-13 RX ORDER — OLANZAPINE 2.5 MG/1
2.5 TABLET, FILM COATED ORAL AT BEDTIME
Qty: 30 TABLET | Refills: 2 | Status: SHIPPED | OUTPATIENT
Start: 2022-10-13 | End: 2023-02-09

## 2022-10-13 RX ORDER — BUPROPION HYDROCHLORIDE 300 MG/1
300 TABLET ORAL EVERY MORNING
Qty: 30 TABLET | Refills: 2 | Status: SHIPPED | OUTPATIENT
Start: 2022-10-13 | End: 2023-02-09

## 2022-10-13 RX ORDER — ARIPIPRAZOLE 10 MG/1
10 TABLET ORAL DAILY
Qty: 30 TABLET | Refills: 2 | Status: SHIPPED | OUTPATIENT
Start: 2022-10-13 | End: 2023-02-09

## 2022-10-13 RX ORDER — LITHIUM CARBONATE 300 MG/1
TABLET, FILM COATED, EXTENDED RELEASE ORAL
Qty: 90 TABLET | Refills: 2 | Status: SHIPPED | OUTPATIENT
Start: 2022-10-13 | End: 2023-02-09

## 2022-10-13 NOTE — PATIENT INSTRUCTIONS
**For crisis resources, please see the information at the end of this document**   Patient Education    Thank you for coming to the [facility] [place] [facility].     Lab Testing:  If you had lab testing today and your results are reassuring or normal they will be mailed to you or sent through [facility] within 7 days. If the lab tests need quick action we will call you with the results. The phone number we will call with results is # [phone]. If this is not the best number please call our clinic and change the number.     Medication Refills:  If you need any refills please call your pharmacy and they will contact us. Our fax number for refills is [phone].   Three business days of notice are needed for general medication refill requests.   Five business days of notice are needed for controlled substance refill requests.   If you need to change to a different pharmacy, please contact the new pharmacy directly. The new pharmacy will help you get your medications transferred.     Contact Us:  Please call [phone] during business hours (8-5:00 M-F).   If you have medication related questions after clinic hours, or on the weekend, please call [phone].     Financial Assistance [phone]   Medical Records [phone]       MENTAL HEALTH CRISIS RESOURCES:  For a emergency help, please call 911 or go to the nearest Emergency Department.     Emergency Walk-In Options:   EmPATH Unit @ [facility] ([place]): [phone] - Specialized mental health emergency area designed to be calming  [facility] ([place]): [phone]  [facility] ([place]): [phone]  [facility]): [phone]    [facility] Crisis Information:   [place]: [phone]  [place]: [phone]  [place] ([place]) - Adult: [phone]     Child: [phone]  [place] - Adult: [phone]     Child: [phone]  [place]:  [phone]  List of all [facility] resources:   https://[place].gov/dhs/people-we-serve/adults/health-care/mental-health/resources/crisis-contacts.jsp    National Crisis Information:   Crisis Text Line: Text  [place]  to 138011  Suicide & Crisis Lifeline: 988  National Suicide Prevention Lifeline: 8-899-478-TALK (1-541.834.9022)       For online chat options, visit https://suicidepreventionlifeline.org/chat/  Poison Control Center: 9-545-302-7913  Trans Lifeline: 4-273-482-6806 - Hotline for transgender people of all ages  The [place] Project: 3-206-887-5290 - Hotline for LGBT youth     For Non-Emergency Support:   Fast Tracker: Mental Health & Substance Use Disorder Resources -   https://www.IntegrateckTeensSuccessn.org/
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
## 2022-11-20 ENCOUNTER — HEALTH MAINTENANCE LETTER (OUTPATIENT)
Age: 32
End: 2022-11-20

## 2022-12-15 NOTE — PROGRESS NOTES
"VIDEO VISIT  Jerrod Solis is a 31year old patient who is being evaluated via a billable video visit.      The patient has been notified of following:   \"This video visit will be conducted via a call between you and your physician/provider. We have found that certain health care needs can be provided without the need for an in-person physical exam. This service lets us provide the care you need with a video conversation. If a prescription is necessary we can send it directly to your pharmacy. If lab work is needed we can place an order for that and you can then stop by our lab to have the test done at a later time. Insurers are generally covering virtual visits as they would in-office visits so billing should not be different than normal.  If for some reason you do get billed incorrectly, you should contact the billing office to correct it and that number is in the AVS .    Video Conference to be completed via:  [email or web address]    Patient has given verbal consent for video visit?:  Yes    Patient would prefer that any video invitations be sent by: Send to e-mail at: [email or web address]      How would patient like to obtain AVS?:  Apptimate    AVS SmartPhrase [PsychAVS] has been placed in 'Patient Instructions':  Yes     Video- Visit Details  Type of service:  video visit for medication management  Time of service:    Date:  01/14/2022    Video Start Time:  3:13p        Video End Time: 3:28p    Reason for video visit:  Patient has requested telehealth visit  Originating Site (patient location):  [facility]   Location- Patient's home  Distant Site (provider location):  Remote location  Mode of Communication:  Video Conference via [email or web address]  Consent:  Patient has given verbal consent for video visit?: Yes     Converted to phone after Jerrod forgot to log on online.         [facility]  Psychiatry Clinic  PSYCHIATRIC PROGRESS NOTE       Jerrod Solis is a 31 year old male who prefers the name Jerrod and " pronouns he, him, his, himself.  Therapist: sees Kaley Galdamez as needed over the last 8 years  PCP: Tatiana Juan  Other Providers:  - lives at group home [facility] (supervisor Erik [phone])  - pharmacy is [facility] [phone]  - lives in his own apartment, weekly meds delivered on Wed without oversight  - guardian/ Dad Lola   -  Carrington Hernandez through [place]    PREVIOUS PSYCH  MED TRIALS:  - Depakote (listed as an allergy, unknown response)  - Abilify  - olanzapine  - Wellbutrin  - lithium    Pertinent Background:  See previous notes.  Psych critical item history includes trauma hx, violent behavior and few known details re: psychiatric history    Interim History     [4, 4]     The patient is a fair historian and reports improved treatment adherence. Last seen on 12/15/2021 when he chose to continue olanzapine 2.5mg at bed, Abilify 10mg daily, Wellbutrin XL 300mg QAM, lithium 300mg QAM and 600mg at bed.     Jerrod presented alone. His Dad/ guardian was not present. His staff supervisor Star () was not present    He gets meds dropped off on Tuedays and then Jerrod takes on his own    Medical meds include Prilosec, levothyroxine 50mcg    Since the last visit , he's been OK.  - taking meds consistently  - feeling stable after lithium reduction  - no observable tremor  - feeling anxious when he's running errands, nba by trying to get in and out  - feeling comfortable with staff Paresh, his Dad and brother.   - enjoyed getting cologne and seeing his Dad most for Gisela  - addressed a hard conversation directly with neighbor about how much noise he was making  - he's playing basketball on video games, watching basketball and pranks on Youtube  - misses playing basketball, wants to use his ankle weights and dumb bells more consistently  - talking to Dad everyday  - Dad perceives lithium has been critical for Jerrod's mood and functioning since 18-19yo  - coping skills  "include listening to music and playing basketball  - support from Dad/ guardian    Recent Symptoms:   Depression: he denies significant depression, irritability   Psychosis: monitoring anxiety vs paranoia, he denies AVH, denies persecutory delusions, hearing the thoughts of his neighbors  Anxiety: socially anxious talking to people he doesn't know and when he's running errands    Trauma Related:  \"I don't talk about that stuff, I'm a pretty strong person\"    Diagnosed with fetal alcohol syndrome as a child; reported FSIQ 46. While he does not read or write, he has reported letters and numbers from his pills to identify what he is taking.    ADVERSE EFFECTS: he denies  MEDICAL CONCERNS: less knee pain    Recent Labs   Lab Test 02/09/21  1319 10/01/19  1316 07/25/18  1044   LITHIUM 1.31* 0.92 0.78       APPETITE: 189# in May 2021  - drinks 5-6 bottles of water, he urinates 3-4x daily and 1x overnight    SLEEP: taking meds at 7p, sleeping 7-8 hours overnight, identifies as a night owl; naps when he's bored     Recent Substance Use:  Caffeine- denies soda, energy drinks; he prefers milk, Gatorade    Social/ Family History      [1ea,1ea]            [per patient report]                 FINANCIAL SUPPORT- social security disability and Dad/guardian is his payee       CHILDREN- never , no kids       LIVING SITUATION- lives independently in his own apartment at his group home  LEGAL- none  EARLY HISTORY/ EDUCATION- moved from [place] to [place] at 7yo, removed from his Mom's custody and placed in foster care. Aware he has 12 siblings in [place]. He thinks he graduated high school [employer].  SOCIAL/ SPIRITUAL SUPPORT- limited social support, identifies as not Holiness.       CULTURAL INFLUENCES/ IMPACT- none         TRAUMA HISTORY- extensive, he declines to discuss  FEELS SAFE AT HOME- Yes  FAMILY HISTORY-  Unknown to Jerrod    Medical / Surgical History                                 Patient Active Problem List "   Diagnosis     Disturbance of conduct     Attention deficit hyperactivity disorder (ADHD)     Traumatic shock (H)     Alcohol affecting fetus or  via placenta or breast milk     Mood disorder in conditions classified elsewhere     Oppositional defiant disorder     CARDIOVASCULAR SCREENING; LDL GOAL LESS THAN 160     Hypothyroidism     Helicobacter positive gastritis     Hypothyroidism due to medication     Hypothyroidism, unspecified type     Gastroesophageal reflux disease without esophagitis     Elevated cholesterol with elevated triglycerides     Elevated serum creatinine       Past Surgical History:   Procedure Laterality Date     BIOPSY  [place]      Medical Review of Systems         [2,10]     A comprehensive review of systems was performed and is negative other than noted in the HPI.    Aware he had a head injury playing basketball when he was younger, denies LOC. Denies seizures.    Allergy    Depakote [valproic acid] and Ibuprofen     Depakote- unspecified reaction    Current Medications        Current Outpatient Medications   Medication Sig Dispense Refill     ARIPiprazole (ABILIFY) 10 MG tablet Take 1 tablet (10 mg) by mouth daily 30 tablet 2     buPROPion (WELLBUTRIN XL) 300 MG 24 hr tablet Take 1 tablet (300 mg) by mouth every morning 30 tablet 2     levothyroxine (SYNTHROID/LEVOTHROID) 50 MCG tablet Take 1 tablet (50 mcg) by mouth every morning 90 tablet 2     lithium ER (LITHOBID) 300 MG CR tablet Take one (1) tab each morning and two (2) tabs in the evening for a total daily dose of 900mg 90 tablet 2     OLANZapine (ZYPREXA) 2.5 MG tablet Take 1 tablet (2.5 mg) by mouth At Bedtime 30 tablet 2     omeprazole (PRILOSEC) 20 MG DR capsule TAKE 1 CAPSULE BY MOUTH DAILY 30-60 MINUTES BEFORE A MEAL 90 capsule 2     Vitals         [3, 3]   There were no vitals taken for this visit.   Mental Status Exam        [9, 14 cog gs]     Alertness: alert  and oriented  Appearance: phone  visit  Behavior/Demeanor: cooperative, pleasant and calm, with fair  eye contact   Speech: normal and regular rate and rhythm  Language: no problems  Psychomotor: restless  Mood: description consistent with euthymia  Affect: restricted and appropriate; was congruent to mood; was congruent to content  Thought Process/Associations: thought blocking  Thought Content:  Reports paranoid ideation;  Denies suicidal ideation, violent ideation, preoccupations, obsessions , phobia , magical thinking and over-valued ideas  Perception:  Reports none;  Denies auditory hallucinations, visual hallucinations, visual distortion seen as shadows , depersonalization and derealization  Insight: gaining/ maintaining insight is challenging  Judgment: adequate for safety  Cognition: (6) does  appear grossly intact; formal cognitive testing was not done  fund of knowledge: delayed  Gait/Station and/or Muscle Strength/Tone: n/a; pacing at start of assessment    Labs and Data                          Rating Scales:    N/A    PHQ9 Today:    PHQ 10/19/2016 1/12/2021   PHQ-9 Total Score 1 0   Q9: Thoughts of better off dead/self-harm past 2 weeks Not at all Not at all     ANTIPSYCHOTIC LABS    Recent Labs   Lab Test 02/09/21  1319 12/11/19  1458 10/18/19  1300 10/01/19  1316 07/25/18  1044 01/09/18  1116   GLC 92  --  106* 90   < > 96   A1C 5.6 5.5  --   --   --  5.0    < > = values in this interval not displayed.     Recent Labs   Lab Test 10/01/19  1316 07/25/18  1044 12/14/16  1335   CHOL 248* 239* 226*   TRIG 132 641* 113   * Cannot estimate LDL when triglyceride exceeds 400 mg/dL 131*   HDL 55 47 72     Recent Labs   Lab Test 02/09/21  1319 10/01/19  1316 11/13/17  1257   AST 16 13 28   ALT 29 23 35   ALKPHOS 86 92 88     Recent Labs   Lab Test 02/09/21  1319 10/01/19  1316 07/25/18  1044 10/14/15  1320 01/09/15  1309   WBC 6.2 6.4 7.1   < > 7.2   ANEU  --  2.4 3.0  --  3.6   HGB 13.7 13.7 13.9   < > 15.0    165 167   < > 202     < > = values in this interval not displayed.     Diagnosis      Unspecified mood disorder, FAS, history of verbal and physical outbursts plus ODD     Assessment      [m2, h3]     TODAY, the following items were discussed:    : 02/2021    PSYCHOTROPIC DRUG INTERACTIONS:  - ARIPIPRAZOLE  -- MIRTAZAPINE -- OLANZAPINE may result in increased risk of QT interval prolongation and ventricular arrhythmias (eg, Torsades de Pointes).   - ARIPIPRAZOLE  -- OLANZAPINE may result in increased risk of QT interval prolongation.   - ARIPIPRAZOLE  -- BUPROPION may result in increased exposure of aripiprazole.   - LITHIUM  -- MIRTAZAPINE may result in increased risk of serotonin syndrome.   - LITHIUM  -- OLANZAPINE may result in weakness, dyskinesias, increased extrapyramidal symptoms, encephalopathy, and brain damage.     Drug Interaction Management: Monitoring for adverse effects, routine vitals, routine labs, periodic EKGs, using lowest therapeutic dose of [psychotropics] and patient is aware of risks    Plan                                                                                                                     m2, h3     1) he chooses to continue olanzapine 2.5mg at bed, Abilify 10mg daily, Wellbutrin XL 300mg QAM, lithium 300mg QAM and 600mg at bedtime, spoke with his Dad/ guardian after visit to share plan  - monitoring tremor  - reviewed lab draw process   - Jerrod prefers a 2 or 230p visit so he can play basketball 3-7p    2) sees therapist Kaley of 8 years as needed  3) monitoring labs including LDL, EKG (Feb 2021 NSR with 390 QTc; Nov 2017 RKG with borderline rhythm and 364 QTc    RTC: 6 weeks, sooner as needed    CRISIS NUMBERS:   Provided routinely in AVS.    Treatment Risk Statement:  The patient understands the risks, benefits, adverse effects and alternatives. Agrees to treatment with the capacity to do so. No medical contraindications to treatment. Agrees to call clinic for any problems. The patient  understands to call 911 or go to the nearest ED if life threatening or urgent symptoms occur.     WHODAS 2.0  TODAY total score = N/A; [a 12-item WHODAS 2.0 assessment was not completed by the pt today and/or recorded in EPIC].    PROVIDER:  TERENCE Park CNP               Discussed treatment plan with daughter and patient at bedside.  DC planning to rehab  I am a non participating [employer] physician seeing Pt in coverage for Dr Villagomez. The above patient documentation by Dr. Pritchett was reviewed and I agree with his evaluation, assessment and treatment plan.  Matthew Vlilagomez M.D.

## 2023-01-12 ENCOUNTER — VIRTUAL VISIT (OUTPATIENT)
Dept: PSYCHIATRY | Facility: CLINIC | Age: 33
End: 2023-01-12
Attending: NURSE PRACTITIONER
Payer: COMMERCIAL

## 2023-01-12 DIAGNOSIS — Z53.9 ERRONEOUS ENCOUNTER--DISREGARD: Primary | ICD-10-CM

## 2023-01-12 PROCEDURE — 99207 PR NO BILLABLE SERVICE THIS VISIT: CPT | Mod: 93 | Performed by: NURSE PRACTITIONER

## 2023-01-12 NOTE — PROGRESS NOTES
M x 2 for Jerrod. Invited call back. Spoke with Dad/ guardian Lola who will be checking on Jerrod and advocating with staff for his needs tomorrow.    This encounter was opened in error. Please disregard.

## 2023-02-07 DIAGNOSIS — F06.30 MOOD DISORDER IN CONDITIONS CLASSIFIED ELSEWHERE: ICD-10-CM

## 2023-02-09 RX ORDER — ARIPIPRAZOLE 10 MG/1
10 TABLET ORAL DAILY
Qty: 31 TABLET | Refills: 0 | Status: SHIPPED | OUTPATIENT
Start: 2023-02-09 | End: 2023-03-17

## 2023-02-09 RX ORDER — BUPROPION HYDROCHLORIDE 300 MG/1
300 TABLET ORAL EVERY MORNING
Qty: 31 TABLET | Refills: 0 | Status: SHIPPED | OUTPATIENT
Start: 2023-02-09 | End: 2023-03-17

## 2023-02-09 RX ORDER — LITHIUM CARBONATE 300 MG/1
TABLET, FILM COATED, EXTENDED RELEASE ORAL
Qty: 93 TABLET | Refills: 0 | Status: SHIPPED | OUTPATIENT
Start: 2023-02-09 | End: 2023-03-17

## 2023-02-09 RX ORDER — OLANZAPINE 2.5 MG/1
2.5 TABLET, FILM COATED ORAL AT BEDTIME
Qty: 31 TABLET | Refills: 0 | Status: SHIPPED | OUTPATIENT
Start: 2023-02-09 | End: 2023-03-01

## 2023-02-09 NOTE — TELEPHONE ENCOUNTER
Medication requested:   ARIPiprazole (ABILIFY) 10 MG tablet  buPROPion (WELLBUTRIN XL) 300 MG 24 hr tablet  lithium ER (LITHOBID) 300 MG CR tablet  OLANZapine (ZYPREXA) 2.5 MG tablet  Last refilled: 10/13/22  Qty:   30/2 30/2  90/2 30/2    Last seen:10/13/22  RTC: 12 weeks  Cancel: 0  No-show: x 1 on 1/12/23  Next appt: 0    Refill decision:   Refill pended and routed to the provider for review/determination due to   NO SHOW X 1  Scheduling has been notified to contact the pt for appointment.

## 2023-02-28 DIAGNOSIS — F06.30 MOOD DISORDER IN CONDITIONS CLASSIFIED ELSEWHERE: ICD-10-CM

## 2023-03-01 RX ORDER — OLANZAPINE 2.5 MG/1
2.5 TABLET, FILM COATED ORAL AT BEDTIME
Qty: 31 TABLET | Refills: 0 | Status: SHIPPED | OUTPATIENT
Start: 2023-03-01 | End: 2023-03-17

## 2023-03-01 NOTE — TELEPHONE ENCOUNTER
Medication requested: OLANZapine 2.5 MG Tablet  Last refilled: 2/9/23  Qty: 31      Last seen: 10/13/22  RTC: 12 weeks  Cancel: 0  No-show: x 1 on 1/12/23  Next appt: 0    Refill decision:   NO SHOW X 1  Scheduling has been notified to contact the pt for appointment.

## 2023-03-16 ASSESSMENT — PATIENT HEALTH QUESTIONNAIRE - PHQ9
10. IF YOU CHECKED OFF ANY PROBLEMS, HOW DIFFICULT HAVE THESE PROBLEMS MADE IT FOR YOU TO DO YOUR WORK, TAKE CARE OF THINGS AT HOME, OR GET ALONG WITH OTHER PEOPLE: SOMEWHAT DIFFICULT
SUM OF ALL RESPONSES TO PHQ QUESTIONS 1-9: 7
SUM OF ALL RESPONSES TO PHQ QUESTIONS 1-9: 7

## 2023-03-17 ENCOUNTER — VIRTUAL VISIT (OUTPATIENT)
Dept: PSYCHIATRY | Facility: CLINIC | Age: 33
End: 2023-03-17
Attending: NURSE PRACTITIONER
Payer: COMMERCIAL

## 2023-03-17 DIAGNOSIS — F06.30 MOOD DISORDER IN CONDITIONS CLASSIFIED ELSEWHERE: ICD-10-CM

## 2023-03-17 PROCEDURE — 99214 OFFICE O/P EST MOD 30 MIN: CPT | Mod: VID | Performed by: NURSE PRACTITIONER

## 2023-03-17 RX ORDER — ARIPIPRAZOLE 10 MG/1
10 TABLET ORAL DAILY
Qty: 31 TABLET | Refills: 1 | Status: SHIPPED | OUTPATIENT
Start: 2023-03-17 | End: 2023-05-12

## 2023-03-17 NOTE — PROGRESS NOTES
VIDEO VISIT  Jerrod Solis is a 32 year old who is being evaluated via a billable video visit.      Telehealth Details  Type of service:  medication management  Time of service:    Start Time:  3:40 PM     End Time:  3:53p    Reason for Telehealth Visit: Patient has requested telehealth visit  Originating Site (patient location):  [facility]   Location- Patient's home  Distant Site (provider location):  Off-site  Mode of Communication:  AmWell     Connected on writer's second call.         [facility]  Psychiatry Clinic  PSYCHIATRIC PROGRESS NOTE       Jerrod Solis is a 32 year old male who prefers the name Jerrod and pronouns he, him.  Therapist: sees Kaley Miqueltoby as needed over the last 8 years  PCP: Tatiana Juan  Other Providers:  - lives at group home called [facility] (supervisor Erik [phone])  - pharmacy is [facility] [phone]  - lives in his own apartment, weekly meds delivered on Wed without oversight  - guardian/ Dad Lola   -  Carrington Hernandez through [place]    PREVIOUS PSYCH  MED TRIALS:  - Depakote (listed as an allergy, unknown response)  - Abilify  - olanzapine  - Wellbutrin  - lithium    Pertinent Background:  See previous notes.  Psych critical item history includes trauma hx, violent behavior and few known details re: psychiatric history    Interim History     [4, 4]     The patient is a fair historian and reports consistent treatment adherence.    Last seen on 10/13/2022 when he chose to continue olanzapine 2.5mg at bed, Abilify 10mg daily, Wellbutrin XL 300mg QAM, lithium 300mg QAM and 600mg at bed.    Jerrod presented alone. His Dad/ guardian Lola was not present. His staff supervisor Star () was not present.    He gets meds dropped off on Tuedays and Jerrod takes meds on his own.    Between visits, he stopped taking everything except one, he thinks aripiprazole 10mg daily.    Since the last visit, he's been OK, feeling  "Abilify is the only med that helps him, \"keeps my head on straight, makes me happy\".  - his staff reduced hours after he stopped working with them  - he met new friends at the gym, they also like basketball, they treat him well \"for the most part\"  - no trouble with his upstairs neighbors, he thinks his old neighbors moved out  - doing \"great\" when his Dad, hopes to see him today  - he's exercising and playing a lot of basketball  - coping skills include listening to music and playing basketball  - support from Dad/ guardian    Recent Symptoms:   Depression: he denies depression symptoms, he denies SI and irritability   Psychosis: he denies paranoia, AVH, history of persecutory delusions, hearing the thoughts of his neighbors  Anxiety: none    Diagnosed with fetal alcohol syndrome as a child; reported FSIQ 46. While he does not read or write, he has reported letters and numbers from his pills to identify what he is taking.    ADVERSE EFFECTS: no tremor  MEDICAL CONCERNS: denies knee pain in winter    APPETITE: unsure how much weight he's lost, 198# in Apr 2022, eating healthy, no takeout or pizza    - drinks 80-90oz water    SLEEP: varies, waking up early for the day, sleeping 12-7a     Recent Substance Use:  Caffeine- occasional soda, denies energy drinks; he prefers milk, Gatorade    Social/ Family History      [1ea,1ea]            [per patient report]                 FINANCIAL SUPPORT- social security disability and Dad/guardian is his payee       CHILDREN- never , no kids       LIVING SITUATION- lives independently in his own apartment at his group home  LEGAL- none  EARLY HISTORY/ EDUCATION- moved from [place] to [place] at 5yo, removed from his Mom's custody and placed in foster care. Aware he has 12 siblings in [place]. He thinks he graduated high school [employer].  SOCIAL/ SPIRITUAL SUPPORT- limited social support, identifies as not Oriental orthodox.       CULTURAL INFLUENCES/ IMPACT- none         TRAUMA HISTORY- " extensive, he declines to discuss  FEELS SAFE AT HOME- Yes  FAMILY HISTORY-  Unknown to Jerrod    Medical / Surgical History                                 Patient Active Problem List   Diagnosis     Disturbance of conduct     Attention deficit hyperactivity disorder (ADHD)     Traumatic shock (H)     Alcohol affecting fetus or  via placenta or breast milk     Mood disorder in conditions classified elsewhere     Oppositional defiant disorder     CARDIOVASCULAR SCREENING; LDL GOAL LESS THAN 160     Hypothyroidism     Helicobacter positive gastritis     Hypothyroidism due to medication     Hypothyroidism, unspecified type     Gastroesophageal reflux disease without esophagitis     Elevated cholesterol with elevated triglycerides     Elevated serum creatinine       Past Surgical History:   Procedure Laterality Date     BIOPSY  [place]      Medical Review of Systems         [2,10]     A comprehensive review of systems was performed and is negative other than noted in the HPI.    Aware he had a head injury playing basketball when he was younger, denies LOC. Denies seizures.    Allergy    Depakote [valproic acid] and Ibuprofen     Depakote- unspecified reaction    Current Medications        Current Outpatient Medications   Medication Sig Dispense Refill     ARIPiprazole (ABILIFY) 10 MG tablet Take 1 tablet (10 mg) by mouth daily For more refills,schedule an appointment at [phone] 31 tablet 0     buPROPion (WELLBUTRIN XL) 300 MG 24 hr tablet Take 1 tablet (300 mg) by mouth every morning For more refills,schedule an appointment at [phone] 31 tablet 0     levothyroxine (SYNTHROID/LEVOTHROID) 50 MCG tablet TAKE 1 TABLET BY MOUTH EVERY MORNING 90 tablet 1     lithium ER (LITHOBID) 300 MG CR tablet Take 1 tablet (300 mg) by mouth every morning AND 2 tablets (600 mg) every evening. For more refills,schedule an appointment at [phone] 93 tablet 0     OLANZapine (ZYPREXA) 2.5 MG tablet Take 1 tablet (2.5 mg) by  mouth At Bedtime For more refills,schedule an appointment at [phone] 31 tablet 0     omeprazole (PRILOSEC) 20 MG DR capsule TAKE 1 CAPSULE BY MOUTH ONCE DAILY 30-60 MINUTES BEFORE A MEAL 90 capsule 1     Vitals         [3, 3]   There were no vitals taken for this visit.   Mental Status Exam        [9, 14 cog gs]     Alertness: alert  and oriented  Appearance: phone visit  Behavior/Demeanor: cooperative, pleasant and calm, with N/A eye contact   Speech: normal and regular rate and rhythm  Language: no problems  Psychomotor: phone visit  Mood: description consistent with euthymia  Affect: restricted and appropriate; was congruent to mood; was congruent to content  Thought Process/Associations: thought blocking  Thought Content:  Reports paranoid ideation;  Denies suicidal ideation, violent ideation, preoccupations, obsessions , phobia , magical thinking and over-valued ideas  Perception:  Reports none;  Denies auditory hallucinations, visual hallucinations, visual distortion seen as shadows , depersonalization and derealization  Insight: gaining/ maintaining insight is challenging  Judgment: adequate for safety  Cognition:does  appear grossly intact; formal cognitive testing was not done  fund of knowledge: delayed  Gait/Station and/or Muscle Strength/Tone: N/A    Labs and Data                          Rating Scales:      Answers for HPI/ROS submitted by the patient on 3/16/2023  If you checked off any problems, how difficult have these problems made it for you to do your work, take care of things at home, or get along with other people?: Somewhat difficult  PHQ9 TOTAL SCORE: 7    PHQ9 Today:    PHQ 7/14/2022 10/12/2022 3/16/2023   PHQ-9 Total Score 3 5 7   Q9: Thoughts of better off dead/self-harm past 2 weeks Not at all Not at all Several days   F/U: Thoughts of suicide or self-harm - - No   F/U: Safety concerns - - Yes     ANTIPSYCHOTIC LABS    Recent Labs   Lab Test 04/01/22  1101 02/09/21  1319  12/11/19  1458 10/18/19  1300   GLC 96 92  --  106*   A1C 5.3 5.6 5.5  --      Recent Labs   Lab Test 04/01/22  1101 10/01/19  1316 07/25/18  1044   CHOL 256* 248* 239*   TRIG 181* 132 641*   * 167* Cannot estimate LDL when triglyceride exceeds 400 mg/dL   HDL 60 55 47     Recent Labs   Lab Test 04/01/22  1101 02/09/21  1319 10/01/19  1316   AST 21 16 13   ALT 27 29 23   ALKPHOS 90 86 92     Recent Labs   Lab Test 04/01/22  1101 02/09/21  1319 10/01/19  1316 07/25/18  1044 10/14/15  1320 01/09/15  1309   WBC 5.8 6.2 6.4 7.1   < > 7.2   ANEU  --   --  2.4 3.0  --  3.6   HGB 13.6 13.7 13.7 13.9   < > 15.0    152 165 167   < > 202    < > = values in this interval not displayed.     Diagnosis      Unspecified mood disorder, FAS, history of verbal and physical outbursts plus ODD     Assessment      [m2, h3]     TODAY, the following items were discussed:    : 02/2021    PSYCHOTROPIC DRUG INTERACTIONS:  - ARIPIPRAZOLE  -- OLANZAPINE may result in increased risk of QT interval prolongation and ventricular arrhythmias (eg, [place] Pointes).   - ARIPIPRAZOLE  -- OLANZAPINE may result in increased risk of QT interval prolongation.   - ARIPIPRAZOLE  -- BUPROPION may result in increased exposure of aripiprazole.   - LITHIUM  -- OLANZAPINE may result in weakness, dyskinesias, increased extrapyramidal symptoms, encephalopathy, and brain damage.     Drug Interaction Management: Monitoring for adverse effects, routine vitals, routine labs, periodic EKGs, using lowest therapeutic dose of psychotropics and patient is aware of risks    Plan                                                                                                                     m2, h3     1) for now, he chooses to continue Abilify 10mg daily and stay off the rest of his previous regimen    - messaged Dad and PCP after visit to coordinate care, encouraged Jerrod to bring his med to the pharmacy or screen shot image to writer to confirm it  is Abilify   - today, he denies tremor; tremor started as a child, lithium started as a teen  - Jerrod prefers a 2 or 230p visit so he can play basketball 3-7p    2) sees therapist Kaley of 8 years as needed  3) monitoring labs, EKG (Feb 2021 NSR with 390 QTc; Nov 2017 RKG borderline rhythm, 364 QTc    RTC: 8 weeks, sooner as needed    CRISIS NUMBERS:   Provided routinely in AVS.    Treatment Risk Statement:  The patient understands the risks, benefits, adverse effects and alternatives. Agrees to treatment with the capacity to do so. No medical contraindications to treatment. Agrees to call clinic for any problems. The patient understands to call 911 or go to the nearest ED if life threatening or urgent symptoms occur.     WHODAS 2.0  TODAY total score = N/A; [a 12-item WHODAS 2.0 assessment was not completed by the pt today and/or recorded in EPIC].    PROVIDER:  TERENCE Park CNP

## 2023-03-17 NOTE — NURSING NOTE
Is the patient currently in the state of [place]? YES    Visit mode:TELEPHONE    If the visit is dropped, the patient can be reconnected by: TELEPHONE VISIT: Phone number: [phone]    Will anyone else be joining the visit? NO      How would you like to obtain your AVS? MyChart    Are changes needed to the allergy or medication list? NO    Reason for visit: Medication check

## 2023-03-17 NOTE — PATIENT INSTRUCTIONS
**For crisis resources, please see the information at the end of this document**   Patient Education    Thank you for coming to the [facility] [place] [facility].     Lab Testing:  If you had lab testing today and your results are reassuring or normal they will be mailed to you or sent through [employer] within 7 days. If the lab tests need quick action we will call you with the results. The phone number we will call with results is # [phone]. If this is not the best number please call our clinic and change the number.     Medication Refills:  If you need any refills please call your pharmacy and they will contact us. Our fax number for refills is [phone].   Three business days of notice are needed for general medication refill requests.   Five business days of notice are needed for controlled substance refill requests.   If you need to change to a different pharmacy, please contact the new pharmacy directly. The new pharmacy will help you get your medications transferred.     Contact Us:  Please call [phone] during business hours (8-5:00 M-F).   If you have medication related questions after clinic hours, or on the weekend, please call [phone].     Financial Assistance [phone]   Medical Records [phone]       MENTAL HEALTH CRISIS RESOURCES:  For a emergency help, please call 911 or go to the nearest Emergency Department.     Emergency Walk-In Options:   Em[facility] Unit @ [facility] ([place]): [phone] - Specialized mental health emergency area designed to be calming  [facility] ([place]): [phone]  [facility] ([place]): [phone]  [facility]): [phone]    [facility] Crisis Information:   [place]: [phone]  [place]: [phone]  [place] ([place]) - Adult: [phone]     Child: [phone]  [place] - Adult: [phone]     Child: [phone]  [place]:  [phone]  List of all [facility] resources:   https://[place].gov/dhs/people-we-serve/adults/health-care/mental-health/resources/crisis-contacts.jsp    National Crisis Information:   Crisis Text Line: Text  [place]  to 667092  Suicide & Crisis Lifeline: 988  National Suicide Prevention Lifeline: 1-532-194-TALK (1-573.266.3434)       For online chat options, visit https://suicidepreventionlifeline.org/chat/  Poison Control Center: 2-626-426-9801  Trans Lifeline: 8-071-250-0729 - Hotline for transgender people of all ages  The [employer] Project: 2-346-256-2055 - Hotline for LGBT youth     For Non-Emergency Support:   Fast Tracker: Mental Health & Substance Use Disorder Resources -   https://www.[employer].org/

## 2023-05-12 ENCOUNTER — VIRTUAL VISIT (OUTPATIENT)
Dept: PSYCHIATRY | Facility: CLINIC | Age: 33
End: 2023-05-12
Attending: NURSE PRACTITIONER
Payer: COMMERCIAL

## 2023-05-12 DIAGNOSIS — Z79.899 ENCOUNTER FOR LONG-TERM (CURRENT) USE OF MEDICATIONS: Primary | ICD-10-CM

## 2023-05-12 DIAGNOSIS — F06.30 MOOD DISORDER IN CONDITIONS CLASSIFIED ELSEWHERE: ICD-10-CM

## 2023-05-12 PROCEDURE — 99443 PR PHYSICIAN TELEPHONE EVALUATION 21-30 MIN: CPT | Mod: 95 | Performed by: NURSE PRACTITIONER

## 2023-05-12 RX ORDER — LITHIUM CARBONATE 300 MG/1
TABLET, FILM COATED, EXTENDED RELEASE ORAL
Qty: 60 TABLET | Refills: 1 | Status: SHIPPED | OUTPATIENT
Start: 2023-05-12 | End: 2023-06-21

## 2023-05-12 RX ORDER — ARIPIPRAZOLE 10 MG/1
10 TABLET ORAL DAILY
Qty: 30 TABLET | Refills: 1 | Status: SHIPPED | OUTPATIENT
Start: 2023-05-12 | End: 2023-06-21

## 2023-05-12 NOTE — NURSING NOTE
Is the patient currently in the state of [place]? YES    Visit mode:TELEPHONE    If the visit is dropped, the patient can be reconnected by: TELEPHONE VISIT: Phone number: [phone]    Will anyone else be joining the visit? NO      How would you like to obtain your AVS? MyChart    Are changes needed to the allergy or medication list? NO    Reason for visit: Follow Up
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
## 2023-06-02 ENCOUNTER — HEALTH MAINTENANCE LETTER (OUTPATIENT)
Age: 33
End: 2023-06-02

## 2023-06-21 ENCOUNTER — VIRTUAL VISIT (OUTPATIENT)
Dept: PSYCHIATRY | Facility: CLINIC | Age: 33
End: 2023-06-21
Attending: NURSE PRACTITIONER
Payer: COMMERCIAL

## 2023-06-21 DIAGNOSIS — F06.30 MOOD DISORDER IN CONDITIONS CLASSIFIED ELSEWHERE: ICD-10-CM

## 2023-06-21 DIAGNOSIS — Z79.899 ENCOUNTER FOR LONG-TERM (CURRENT) USE OF MEDICATIONS: ICD-10-CM

## 2023-06-21 PROCEDURE — 99442 PR PHYSICIAN TELEPHONE EVALUATION 11-20 MIN: CPT | Mod: 95 | Performed by: NURSE PRACTITIONER

## 2023-06-21 RX ORDER — ARIPIPRAZOLE 10 MG/1
10 TABLET ORAL AT BEDTIME
Qty: 30 TABLET | Refills: 1 | Status: SHIPPED | OUTPATIENT
Start: 2023-07-04 | End: 2023-12-28

## 2023-06-21 RX ORDER — LITHIUM CARBONATE 300 MG/1
TABLET, FILM COATED, EXTENDED RELEASE ORAL
Qty: 60 TABLET | Refills: 1 | Status: SHIPPED | OUTPATIENT
Start: 2023-07-04 | End: 2023-12-28

## 2023-06-21 ASSESSMENT — PATIENT HEALTH QUESTIONNAIRE - PHQ9
SUM OF ALL RESPONSES TO PHQ QUESTIONS 1-9: 6
SUM OF ALL RESPONSES TO PHQ QUESTIONS 1-9: 6
10. IF YOU CHECKED OFF ANY PROBLEMS, HOW DIFFICULT HAVE THESE PROBLEMS MADE IT FOR YOU TO DO YOUR WORK, TAKE CARE OF THINGS AT HOME, OR GET ALONG WITH OTHER PEOPLE: SOMEWHAT DIFFICULT
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
## 2023-06-21 NOTE — NURSING NOTE
Is the patient currently in the state of [place]? YES    Visit mode:TELEPHONE    If the visit is dropped, the patient can be reconnected by: TELEPHONE VISIT: Phone number: [phone]    Will anyone else be joining the visit? NO      How would you like to obtain your AVS? MyChart    Are changes needed to the allergy or medication list? No changes  but not taking medication consistently per Lola        Reason for visit: RICARDO Saavedra VF

## 2023-11-08 ENCOUNTER — MYC MEDICAL ADVICE (OUTPATIENT)
Dept: PSYCHIATRY | Facility: CLINIC | Age: 33
End: 2023-11-08
Payer: COMMERCIAL

## 2023-12-20 ENCOUNTER — OFFICE VISIT (OUTPATIENT)
Dept: FAMILY MEDICINE | Facility: CLINIC | Age: 33
End: 2023-12-20
Payer: COMMERCIAL

## 2023-12-20 ENCOUNTER — ANCILLARY PROCEDURE (OUTPATIENT)
Dept: GENERAL RADIOLOGY | Facility: CLINIC | Age: 33
End: 2023-12-20
Attending: FAMILY MEDICINE
Payer: COMMERCIAL

## 2023-12-20 VITALS
SYSTOLIC BLOOD PRESSURE: 135 MMHG | OXYGEN SATURATION: 99 % | TEMPERATURE: 97.3 F | WEIGHT: 154.1 LBS | DIASTOLIC BLOOD PRESSURE: 79 MMHG | HEIGHT: 69 IN | HEART RATE: 72 BPM | RESPIRATION RATE: 16 BRPM | BODY MASS INDEX: 22.82 KG/M2

## 2023-12-20 DIAGNOSIS — M25.531 RIGHT WRIST PAIN: ICD-10-CM

## 2023-12-20 DIAGNOSIS — Z11.3 SCREEN FOR STD (SEXUALLY TRANSMITTED DISEASE): ICD-10-CM

## 2023-12-20 DIAGNOSIS — M25.531 RIGHT WRIST PAIN: Primary | ICD-10-CM

## 2023-12-20 LAB — T PALLIDUM AB SER QL: NONREACTIVE

## 2023-12-20 PROCEDURE — 87591 N.GONORRHOEAE DNA AMP PROB: CPT | Performed by: FAMILY MEDICINE

## 2023-12-20 PROCEDURE — 99213 OFFICE O/P EST LOW 20 MIN: CPT | Performed by: FAMILY MEDICINE

## 2023-12-20 PROCEDURE — 87389 HIV-1 AG W/HIV-1&-2 AB AG IA: CPT | Performed by: FAMILY MEDICINE

## 2023-12-20 PROCEDURE — 86780 TREPONEMA PALLIDUM: CPT | Performed by: FAMILY MEDICINE

## 2023-12-20 PROCEDURE — 87491 CHLMYD TRACH DNA AMP PROBE: CPT | Performed by: FAMILY MEDICINE

## 2023-12-20 PROCEDURE — 73110 X-RAY EXAM OF WRIST: CPT | Mod: TC | Performed by: RADIOLOGY

## 2023-12-20 PROCEDURE — 86803 HEPATITIS C AB TEST: CPT | Performed by: FAMILY MEDICINE

## 2023-12-20 PROCEDURE — 36415 COLL VENOUS BLD VENIPUNCTURE: CPT | Performed by: FAMILY MEDICINE

## 2023-12-20 ASSESSMENT — PATIENT HEALTH QUESTIONNAIRE - PHQ9
SUM OF ALL RESPONSES TO PHQ QUESTIONS 1-9: 0
SUM OF ALL RESPONSES TO PHQ QUESTIONS 1-9: 0
10. IF YOU CHECKED OFF ANY PROBLEMS, HOW DIFFICULT HAVE THESE PROBLEMS MADE IT FOR YOU TO DO YOUR WORK, TAKE CARE OF THINGS AT HOME, OR GET ALONG WITH OTHER PEOPLE: NOT DIFFICULT AT ALL

## 2023-12-20 ASSESSMENT — ANXIETY QUESTIONNAIRES
GAD7 TOTAL SCORE: 0
8. IF YOU CHECKED OFF ANY PROBLEMS, HOW DIFFICULT HAVE THESE MADE IT FOR YOU TO DO YOUR WORK, TAKE CARE OF THINGS AT HOME, OR GET ALONG WITH OTHER PEOPLE?: NOT DIFFICULT AT ALL
5. BEING SO RESTLESS THAT IT IS HARD TO SIT STILL: NOT AT ALL
IF YOU CHECKED OFF ANY PROBLEMS ON THIS QUESTIONNAIRE, HOW DIFFICULT HAVE THESE PROBLEMS MADE IT FOR YOU TO DO YOUR WORK, TAKE CARE OF THINGS AT HOME, OR GET ALONG WITH OTHER PEOPLE: NOT DIFFICULT AT ALL
1. FEELING NERVOUS, ANXIOUS, OR ON EDGE: NOT AT ALL
4. TROUBLE RELAXING: NOT AT ALL
7. FEELING AFRAID AS IF SOMETHING AWFUL MIGHT HAPPEN: NOT AT ALL
GAD7 TOTAL SCORE: 0
3. WORRYING TOO MUCH ABOUT DIFFERENT THINGS: NOT AT ALL
2. NOT BEING ABLE TO STOP OR CONTROL WORRYING: NOT AT ALL
7. FEELING AFRAID AS IF SOMETHING AWFUL MIGHT HAPPEN: NOT AT ALL
6. BECOMING EASILY ANNOYED OR IRRITABLE: NOT AT ALL
GAD7 TOTAL SCORE: 0

## 2023-12-20 ASSESSMENT — PAIN SCALES - GENERAL: PAINLEVEL: SEVERE PAIN (6)
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
## 2023-12-20 NOTE — LETTER
December 29, 2023      Jerrod Solis  [address] [place] [id number]        Dear ,    We are writing to inform you of your test results.    Your test results fall within the expected range(s) or remain unchanged from previous results.  Please continue with current treatment plan.    Resulted Orders   NEISSERIA GONORRHOEA PCR   Result Value Ref Range    Neisseria gonorrhoeae Negative Negative      Comment:      Negative for N. gonorrhoeae rRNA by transcription mediated amplification. A negative result by transcription mediated amplification does not preclude the presence of C. trachomatis infection because results are dependent on proper and adequate collection, absence of inhibitors and sufficient rRNA to be detected.   CHLAMYDIA TRACHOMATIS PCR   Result Value Ref Range    Chlamydia trachomatis Negative Negative      Comment:      A negative result by transcription mediated amplification does not preclude the presence of C. trachomatis infection because results are dependent on proper and adequate collection, absence of inhibitors and sufficient rRNA to be detected.   Hepatitis C antibody   Result Value Ref Range    Hepatitis C Antibody Nonreactive Nonreactive      Comment:      A nonreactive screening test result does not exclude the possibility of exposure to or infection with HCV. Nonreactive screening test results in individuals with prior exposure to HCV may be due to antibody levels below the limit of detection of this assay or lack of reactivity to the HCV antigens used in this assay. Patients with recent HCV infections (<3 months from time of exposure) may have false-negative HCV antibody results due to the time needed for seroconversion (average of 8 to 9 weeks).   Treponema Abs w Reflex to RPR and Titer   Result Value Ref Range    Treponema Antibody Total Nonreactive Nonreactive   HIV Antigen Antibody Combo   Result Value Ref Range    HIV Antigen Antibody Combo Nonreactive Nonreactive       Comment:      Negative HIV-1/-2 antigen and antibody screening test results usually indicate the absence of HIV-1 and HIV-2 infection. However, such negative results do not rule-out acute HIV infection.  If acute HIV-1 or HIV-2 infection is suspected, detection of HIV-1 or HIV-2 RNA  is recommended.        If you have any questions or concerns, please call the clinic at the number listed above.       Sincerely,      Tatiana Juan MD

## 2023-12-21 LAB
C TRACH DNA SPEC QL NAA+PROBE: NEGATIVE
HCV AB SERPL QL IA: NONREACTIVE
HIV 1+2 AB+HIV1 P24 AG SERPL QL IA: NONREACTIVE
N GONORRHOEA DNA SPEC QL NAA+PROBE: NEGATIVE

## 2023-12-27 DIAGNOSIS — Z79.899 ENCOUNTER FOR LONG-TERM (CURRENT) USE OF MEDICATIONS: ICD-10-CM

## 2023-12-27 DIAGNOSIS — F06.30 MOOD DISORDER IN CONDITIONS CLASSIFIED ELSEWHERE: ICD-10-CM

## 2023-12-28 RX ORDER — LITHIUM CARBONATE 300 MG/1
TABLET, FILM COATED, EXTENDED RELEASE ORAL
Qty: 60 TABLET | Refills: 0 | Status: SHIPPED | OUTPATIENT
Start: 2023-12-28 | End: 2023-12-29

## 2023-12-28 RX ORDER — ARIPIPRAZOLE 10 MG/1
10 TABLET ORAL DAILY
Qty: 30 TABLET | Refills: 0 | Status: SHIPPED | OUTPATIENT
Start: 2023-12-28 | End: 2023-12-29

## 2023-12-28 NOTE — TELEPHONE ENCOUNTER
Medication requested: ARIPiprazole (ABILIFY) 10 MG tablet   Last refilled: 7/4/23  Qty: 30/1    Medication requested: lithium ER (LITHOBID) 300 MG CR tablet   Last refilled: 7/4/23  Qty: 60/1    Last seen: 6/21/23  RTC: 6-7 weeks  Cancel: 0  No-show: 0  Next appt: 0    Refill decision:   Refill pended and routed to the provider for review/determination due to   Pt outside of RTC timeframe.  Has not been taking medications per my-chart message 11/7/23    Scheduling has been notified to contact the pt for appointment.

## 2023-12-29 ENCOUNTER — VIRTUAL VISIT (OUTPATIENT)
Dept: PSYCHIATRY | Facility: CLINIC | Age: 33
End: 2023-12-29
Attending: NURSE PRACTITIONER
Payer: COMMERCIAL

## 2023-12-29 DIAGNOSIS — Z79.899 ENCOUNTER FOR LONG-TERM (CURRENT) USE OF MEDICATIONS: ICD-10-CM

## 2023-12-29 DIAGNOSIS — F06.30 MOOD DISORDER IN CONDITIONS CLASSIFIED ELSEWHERE: ICD-10-CM

## 2023-12-29 PROCEDURE — 99443 PR PHYSICIAN TELEPHONE EVALUATION 21-30 MIN: CPT | Mod: 95 | Performed by: NURSE PRACTITIONER

## 2023-12-29 RX ORDER — LITHIUM CARBONATE 300 MG/1
TABLET, FILM COATED, EXTENDED RELEASE ORAL
Qty: 30 TABLET | Refills: 1 | Status: SHIPPED | OUTPATIENT
Start: 2023-12-29 | End: 2024-04-05

## 2023-12-29 ASSESSMENT — PAIN SCALES - GENERAL: PAINLEVEL: NO PAIN (0)

## 2023-12-29 NOTE — PATIENT INSTRUCTIONS
**For crisis resources, please see the information at the end of this document**   Patient Education    Thank you for coming to the [facility] [place] [facility].     Lab Testing:  If you had lab testing today and your results are reassuring or normal they will be mailed to you or sent through [facility] within 7 days. If the lab tests need quick action we will call you with the results. The phone number we will call with results is # [phone]. If this is not the best number please call our clinic and change the number.     Medication Refills:  If you need any refills please call your pharmacy and they will contact us. Our fax number for refills is [phone].   Three business days of notice are needed for general medication refill requests.   Five business days of notice are needed for controlled substance refill requests.   If you need to change to a different pharmacy, please contact the new pharmacy directly. The new pharmacy will help you get your medications transferred.     Contact Us:  Please call [phone] during business hours (8-5:00 M-F).   If you have medication related questions after clinic hours, or on the weekend, please call [phone].     Financial Assistance [phone]   Medical Records [phone]       MENTAL HEALTH CRISIS RESOURCES:  For a emergency help, please call 911 or go to the nearest Emergency Department.     Emergency Walk-In Options:   EmPATH Unit @ [facility] ([place]): [phone] - Specialized mental health emergency area designed to be calming  [facility] ([place]): [phone]  [facility] ([place]): [phone]  [facility]): [phone]    [facility] Crisis Information:   [place]: [phone]  [place]: [phone]  [place] ([place]) - Adult: [phone]     Child: [phone]  [place] - Adult: [phone]     Child: [phone]  [place]:  [phone]  List of all [facility] resources:   https://[place].gov/dhs/people-we-serve/adults/health-care/mental-health/resources/crisis-contacts.jsp    National Crisis Information:   Crisis Text Line: Text  [place]  to 477723  Suicide & Crisis Lifeline: 988  National Suicide Prevention Lifeline: 5-774-296-TALK (1-713.722.4458)       For online chat options, visit https://suicidepreventionlifeline.org/chat/  Poison Control Center: 0-672-919-1331  Trans Lifeline: 2-221-848-7857 - Hotline for transgender people of all ages  The [employer]: 9-110-956-7577 - Hotline for LGBT youth     For Non-Emergency Support:   Fast Tracker: Mental Health & Substance Use Disorder Resources -   https://www.[facility].org/
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
## 2023-12-29 NOTE — NURSING NOTE
Is the patient currently in the state of [place]? YES    Visit mode:TELEPHONE    If the visit is dropped, the patient can be reconnected by: TELEPHONE VISIT: Phone number:   Telephone Information:   Mobile [phone]       Will anyone else be joining the visit? NO  (If patient encounters technical issues they should call [phone]    How would you like to obtain your AVS? MyChart    Are changes needed to the allergy or medication list? No    Reason for visit: RECHECK    Milagros DOMÍNGUEZ

## 2024-04-04 DIAGNOSIS — Z79.899 ENCOUNTER FOR LONG-TERM (CURRENT) USE OF MEDICATIONS: ICD-10-CM

## 2024-04-04 DIAGNOSIS — F06.30 MOOD DISORDER IN CONDITIONS CLASSIFIED ELSEWHERE: ICD-10-CM

## 2024-04-08 NOTE — TELEPHONE ENCOUNTER
Medication requested: lithium ER (LITHOBID) 300 MG CR tablet   Last refilled: 12/29/23  Qty: 30/1      Last seen: 12/29/23  RTC: 5 weeks  Cancel: 0  No-show: 0  Next appt: 0    Refill decision:   Refill pended and routed to the provider for review/determination due to   Compliant with lithium??? Should have been out at the end of Feb..  Pt outside of RTC timeframe.  Scheduling has been notified to contact the pt for appointment.

## 2024-04-09 RX ORDER — LITHIUM CARBONATE 300 MG/1
300 TABLET, FILM COATED, EXTENDED RELEASE ORAL AT BEDTIME
Qty: 30 TABLET | Refills: 0 | Status: SHIPPED | OUTPATIENT
Start: 2024-04-09 | End: 2024-04-15

## 2024-04-09 NOTE — TELEPHONE ENCOUNTER
Reached out to patient to connect regarding mediation regimen. The phone was answered by dadLola (LG) who shared that patient is likely taking medication but is not able to confirm. Shared that patient was at crisis home and had some confusion regarding mediations in January and therefore was not taking it regularly but should be taking it now.     Will reach out to provider for approval

## 2024-04-15 ENCOUNTER — VIRTUAL VISIT (OUTPATIENT)
Dept: PSYCHIATRY | Facility: CLINIC | Age: 34
End: 2024-04-15
Attending: NURSE PRACTITIONER
Payer: COMMERCIAL

## 2024-04-15 DIAGNOSIS — Z79.899 ENCOUNTER FOR LONG-TERM (CURRENT) USE OF MEDICATIONS: ICD-10-CM

## 2024-04-15 DIAGNOSIS — F06.30 MOOD DISORDER IN CONDITIONS CLASSIFIED ELSEWHERE: ICD-10-CM

## 2024-04-15 PROCEDURE — 99442 PR PHYSICIAN TELEPHONE EVALUATION 11-20 MIN: CPT | Mod: 93 | Performed by: NURSE PRACTITIONER

## 2024-04-15 RX ORDER — LITHIUM CARBONATE 300 MG/1
300 TABLET, FILM COATED, EXTENDED RELEASE ORAL AT BEDTIME
Qty: 30 TABLET | Refills: 5 | Status: SHIPPED | OUTPATIENT
Start: 2024-04-15 | End: 2024-06-20

## 2024-04-15 NOTE — NURSING NOTE
Is the patient currently in the state of [place]? YES    Visit mode:TELEPHONE    If the visit is dropped, the patient can be reconnected by: TELEPHONE VISIT: Phone number:   Telephone Information:   Mobile [phone]       Will anyone else be joining the visit? NO  (If patient encounters technical issues they should call [phone]    How would you like to obtain your AVS? MyChart    Are changes needed to the allergy or medication list? Pt stated no changes to allergies and Pt stated no med changes    Are refills needed on medications prescribed by this physician? NO    Reason for visit: RICARDO PATELF

## 2024-04-15 NOTE — PROGRESS NOTES
"Virtual Visit Details    Type of service:  Telephone Visit   Phone call duration: 19 minutes (1:34p - 1:53p)  Originating Location (pt. Location): Home  Distant Location (provider location):  Off-site       [facility]  Psychiatry Clinic    PSYCHIATRIC PROGRESS NOTE       Jerrod Solis is a 33 year old male who prefers the name Jerrod and pronouns he, him.  Therapist: sees Kaley Galdamez as needed over the last 8 years  PCP: Tatiana Juan  Other Providers:  - lives at group home [facility] (supervisor Erik [phone])  - pharmacy is [facility] [phone]  - lives in his own apartment, he has off-site staff  - guardian/ Dad Lola   -  Carrington Hernandez through [facility]    PREVIOUS PSYCH  MED TRIALS:  - Depakote (listed as an allergy, unknown response)  - Abilify  - olanzapine  - Wellbutrin  - lithium    Pertinent Background:  See previous notes.  Psych critical item history includes trauma hx, violent behavior and few known details re: psychiatric history    Interim History        The patient is a fair historian and reports recently improved treatment adherence.    Last seen on 12/29/2023 when he chose to restart lithium 300mg at bed.    Jerrod presented alone. His Dad/ guardian Lola was not present. His staff supervisor Star () was not present.    Since the last visit, he's been pretty good.  - he alternates taking lithium QAM and QPM, he not sure why he \"switched\" it, but might forget to take it some nights  - his mood feels more level  - no contact with his ex, he met her on a dating jose  - doing \"good\" with his Dad, he's busy with his SO but when they talk he hears he's doing well  - not playing basketball as much, not exercising as much as his apartment gym  [facility]  - not seeing staff much except on weekends  - doing OK with his neighbors  - enjoys basketball, watching TV and Youtube  - support from Dad/ " guardian    Recent Symptoms:   Depression: he denies significant symptoms, less irritability, no SI or anger dyscontrol  Psychosis: paranoia and persecutory delusions about his safety, other's intentions, that they'll hurt him  Anxiety: he denies emotional anxiety and endorses pacing, dislikes being touched by strangers    Diagnosed with FAS as a child; reported FSIQ 46. While he does not read or write, he's reported letters, numbers from pills to identify what he is taking.    ADVERSE EFFECTS: N/A  MEDICAL CONCERNS: knee pain     Recent Labs   Lab Test 22  1101 21  1319 10/01/19  1316   LITHIUM 0.5 1.31* 0.92     APPETITE: feeling stable, getting groceries weekly from his Dad, 154# in Dec 2023, 198# in 2022, drinking 50-65oz water daily  SLEEP: sleeping 8 hours overnight, might nap 3 hours if he needs it    Recent Substance Use:  Caffeine- no soda or energy drinks, prefers water    Social/ Family History                 FINANCIAL SUPPORT- social security disability and Dad/guardian is his payee        CHILDREN- never , no kids       LIVING SITUATION- lives independently in his own apartment at his group home  LEGAL- none  EARLY HISTORY/ EDUCATION- moved from [place] to [place] at 7yo, removed from his Mom's custody and placed in foster care. Aware he has 12 siblings in [place]. He thinks he graduated high school [employer].  SOCIAL/ SPIRITUAL SUPPORT- limited social support, identifies as not Holiness.       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    Mood disorder in conditions classified elsewhere    Oppositional defiant  disorder    CARDIOVASCULAR SCREENING; LDL GOAL LESS THAN 160    Hypothyroidism    Helicobacter positive gastritis    Hypothyroidism due to medication    Hypothyroidism, unspecified type    Gastroesophageal reflux disease without esophagitis    Elevated cholesterol with elevated triglycerides    Elevated serum creatinine       Past Surgical History:   Procedure Laterality Date    BIOPSY  Marrow      Medical Review of Systems             A comprehensive review of systems was performed and is negative other than noted in the HPI.    Aware he had a head injury playing basketball when he was younger, denies LOC. Denies seizures.    Allergy    Depakote [valproic acid] and Ibuprofen     Depakote- unspecified reaction    Current Medications        Current Outpatient Medications   Medication Sig Dispense Refill    levothyroxine (SYNTHROID/LEVOTHROID) 50 MCG tablet TAKE 1 TABLET BY MOUTH EVERY MORNING (Patient not taking: Reported on 12/29/2023) 90 tablet 1    lithium ER (LITHOBID) 300 MG CR tablet Take 1 tablet (300 mg) by mouth at bedtime For more refills,schedule an appointment at [phone] 30 tablet 0    omeprazole (PRILOSEC) 20 MG DR capsule TAKE 1 CAPSULE BY MOUTH ONCE DAILY 30-60 MINUTES BEFORE A MEAL (Patient not taking: Reported on 12/29/2023) 90 capsule 1     Vitals        There were no vitals taken for this visit.     Pulse Readings from Last 5 Encounters:   12/20/23 72   04/01/22 70   05/25/21 79   02/12/21 78   02/09/21 99     Wt Readings from Last 5 Encounters:   12/20/23 69.9 kg (154 lb 1.6 oz)   04/01/22 89.8 kg (198 lb)   05/25/21 85.9 kg (189 lb 6.4 oz)   02/12/21 85.3 kg (188 lb)   02/09/21 85.3 kg (188 lb)     BP Readings from Last 5 Encounters:   12/20/23 135/79   04/01/22 137/89   05/25/21 133/82   02/12/21 138/80   02/09/21 138/88     Mental Status Exam           Alertness: alert  and oriented  Appearance: phone visit  Behavior/Demeanor: cooperative, pleasant and calm, with N/A eye contact   Speech:  normal and regular rate and rhythm  Language: no problems  Psychomotor: phone visit  Mood: description consistent with euthymia  Affect: restricted and appropriate; was congruent to mood; was congruent to content  Thought Process/Associations: thought blocking  Thought Content:  Reports paranoid ideation;  Denies suicidal ideation, violent ideation, preoccupations, obsessions , phobia , magical thinking and over-valued ideas  Perception:  Reports none;  Denies auditory hallucinations, visual hallucinations, visual distortion seen as shadows , depersonalization and derealization  Insight: gaining/ maintaining insight is challenging  Judgment: adequate for safety  Cognition:does  appear grossly intact; formal cognitive testing was not done  fund of knowledge: delayed  Gait/Station and/or Muscle Strength/Tone: N/A    Labs and Data                          Rating Scales:      PHQ9 Today:        6/21/2023    11:13 AM 8/2/2023     8:21 AM 12/20/2023    12:25 PM   PHQ   PHQ-9 Total Score 6 7 0   Q9: Thoughts of better off dead/self-harm past 2 weeks Several days Several days Not at all   F/U: Thoughts of suicide or self-harm No Yes    F/U: Self harm-plan  Yes    F/U: Self-harm action  No    F/U: Safety concerns Yes Yes      ANTIPSYCHOTIC LABS    Recent Labs   Lab Test 04/01/22  1101 02/09/21  1319 12/11/19  1458 10/18/19  1300   GLC 96 92  --  106*   A1C 5.3 5.6 5.5  --      Recent Labs   Lab Test 04/01/22  1101 10/01/19  1316 07/25/18  1044   CHOL 256* 248* 239*   TRIG 181* 132 641*   * 167* Cannot estimate LDL when triglyceride exceeds 400 mg/dL   HDL 60 55 47     Recent Labs   Lab Test 04/01/22  1101 02/09/21  1319 10/01/19  1316   AST 21 16 13   ALT 27 29 23   ALKPHOS 90 86 92     Recent Labs   Lab Test 04/01/22  1101 02/09/21  1319 10/01/19  1316 07/25/18  1044   WBC 5.8 6.2 6.4 7.1   ANEU  --   --  2.4 3.0   HGB 13.6 13.7 13.7 13.9    152 165 167     Diagnosis      Unspecified mood disorder, FAS,  history of verbal and physical outbursts plus ODD     Assessment        TODAY, the following items were discussed:    : 02/2021    PSYCHOTROPIC DRUG INTERACTIONS:   - LITHIUM  -- OLANZAPINE may result in weakness, dyskinesias, increased extrapyramidal symptoms, encephalopathy, and brain damage.     Drug Interaction Management: monitoring for adverse effects, routine vitals, routine labs, periodic EKGs, using lowest therapeutic dose of psychotropics and patient is aware of risks    Plan                                                                                                                        1) he chooses to continue lithium 300mg at bed     - spoke with Dad/ guardian Lola, re: [facility] at [facility], labs, status; Lola reports 10 day crisis placement in [place] and stable mood since  - he declines Abilify or any med/ dose that will make him gain weight  - no reported tremor today; tremor started as a child, lithium started as a teen  - Jerrod prefers a visit before 230p so he can play basketball QPM    2) sees therapist Kaley of 8 years as needed  3) future lab orders placed in May May 2023, monitoring EKG (Feb 2021 NSR with 390 QTc; Nov 2017 RKG borderline rhythm, 364 QTc    RTC: 8 weeks, sooner as needed    Level of Medical Decision Making:   - At least 1 chronic problem that is not stable  - Engaged in prescription drug management during visit (discussed any medication benefits, side effects, alternatives, etc.)     The longitudinal plan of care for mood stability, safety was addressed during this visit. Due to the added complexity in care, I will continue to support Jerrod in the subsequent management of this condition(s) and with the ongoing continuity of care of this condition(s).    CRISIS NUMBERS:   Provided routinely in AVS.    Treatment Risk Statement:  The patient understands the risks, benefits, adverse effects and alternatives. Agrees to treatment with the capacity to do so. No medical  contraindications to treatment. Agrees to call clinic for any problems. The patient understands to call 911 or go to the nearest ED if life threatening or urgent symptoms occur.     WHODAS 2.0  TODAY total score = N/A; [a 12-item WHODAS 2.0 assessment was not completed by the pt today and/or recorded in EPIC].    PROVIDER:  TERENCE Park CNP

## 2024-04-19 ENCOUNTER — MYC MEDICAL ADVICE (OUTPATIENT)
Dept: FAMILY MEDICINE | Facility: CLINIC | Age: 34
End: 2024-04-19
Payer: COMMERCIAL

## 2024-06-20 ENCOUNTER — VIRTUAL VISIT (OUTPATIENT)
Dept: PSYCHIATRY | Facility: CLINIC | Age: 34
End: 2024-06-20
Attending: NURSE PRACTITIONER
Payer: COMMERCIAL

## 2024-06-20 DIAGNOSIS — F06.30 MOOD DISORDER IN CONDITIONS CLASSIFIED ELSEWHERE: Primary | ICD-10-CM

## 2024-06-20 PROCEDURE — 99443 PR PHYSICIAN TELEPHONE EVALUATION 21-30 MIN: CPT | Mod: 93 | Performed by: NURSE PRACTITIONER

## 2024-06-20 RX ORDER — OLANZAPINE 5 MG/1
5 TABLET ORAL AT BEDTIME
Qty: 30 TABLET | Refills: 0 | Status: SHIPPED | OUTPATIENT
Start: 2024-06-20 | End: 2024-07-31

## 2024-06-20 NOTE — PATIENT INSTRUCTIONS
**For crisis resources, please see the information at the end of this document**   Patient Education    Thank you for coming to the [facility] [place] [facility].     Lab Testing:  If you had lab testing today and your results are reassuring or normal they will be mailed to you or sent through [employer] within 7 days. If the lab tests need quick action we will call you with the results. The phone number we will call with results is # [phone]. If this is not the best number please call our clinic and change the number.     Medication Refills:  If you need any refills please call your pharmacy and they will contact us. Our fax number for refills is [phone].   Three business days of notice are needed for general medication refill requests.   Five business days of notice are needed for controlled substance refill requests.   If you need to change to a different pharmacy, please contact the new pharmacy directly. The new pharmacy will help you get your medications transferred.     Contact Us:  Please call [phone] during business hours (8-5:00 M-F).   If you have medication related questions after clinic hours, or on the weekend, please call [phone].     Financial Assistance [phone]   Medical Records [phone]       MENTAL HEALTH CRISIS RESOURCES:  For a emergency help, please call 911 or go to the nearest Emergency Department.     Emergency Walk-In Options:   EmPATH Unit @ [facility] ([place]): [phone] - Specialized mental health emergency area designed to be calming  [facility] ([place]): [phone]  [facility] ([place]): [phone]  [facility]): [phone]    [facility] Crisis Information:   [place]: [phone]  [place]: [phone]  [place] ([place]) - Adult: [phone]     Child: [phone]  [place] - Adult: [phone]     Child: [phone]  [place]:  [phone]  List of all [facility] resources:   https://[place].gov/dhs/people-we-serve/adults/health-care/mental-health/resources/crisis-contacts.jsp    National Crisis Information:   Crisis Text Line: Text  [place]  to 162083  Suicide & Crisis Lifeline: 988  National Suicide Prevention Lifeline: 3-249-250-TALK (1-454.141.7537)       For online chat options, visit https://suicidepreventionlifeline.org/chat/  Poison Control Center: 8-305-713-9145  Trans Lifeline: 8-220-473-6932 - Hotline for transgender people of all ages  The [employer] Project: 0-735-579-9533 - Hotline for LGBT youth     For Non-Emergency Support:   Fast Tracker: Mental Health & Substance Use Disorder Resources -   https://www.[employer].org/

## 2024-06-20 NOTE — NURSING NOTE
Is the patient currently in the state of [place]? YES    Visit mode:TELEPHONE    If the visit is dropped, the patient can be reconnected by: TELEPHONE VISIT: Phone number: [phone] -pts cell    Will anyone else be joining the visit? NO  (If patient encounters technical issues they should call [phone])    How would you like to obtain your AVS? MyChart    Are changes needed to the allergy or medication list? Yes please remove meds flagged for removal:   -levothyroxine (SYNTHROID/LEVOTHROID) 50 MCG tablet   -omeprazole (PRILOSEC) 20 MG DR capsule     Are refills needed on medications prescribed by this physician? Unsure- is supposed to be helping with this per dad.    Reason for visit: RECHECK    Patient not present during check-in to assess pain/vitals or go through questionnaires.    Libertad PATELF

## 2024-06-20 NOTE — PROGRESS NOTES
"Virtual Visit Details    Type of service:  Telephone Visit   Phone call duration: 29 minutes (2:03p - 2:32p  Originating Location (pt. Location): Home  Distant Location (provider location):  Off-site       [facility]  Psychiatry Clinic    PSYCHIATRIC PROGRESS NOTE       Jerrod Solis is a 33 year old male who prefers the name Jerrod and pronouns he, him.  Therapist: sees Kaley Galdamez as needed over the last 8 years  PCP: Tatiana Juan  Other Providers:  - living in a crisis group home since early May 2024  - pharmacy is [facility] [phone]  - guardian/ Dad Lola   - CM Carrington Hernandez through [facility]    PREVIOUS PSYCH  MED TRIALS:  - Depakote (listed as an allergy, unknown response)  - Abilify  - olanzapine  - Wellbutrin  - lithium    Pertinent Background:  See previous notes.  Psych critical item history includes trauma hx, violent behavior and few known details re: psychiatric history    Interim History        The patient is a fair historian and reports recently improved treatment adherence.    Last seen on 4/15/2024 when he chose to continue lithium 300mg at bed.    Jerrod presented alone. Spoke with Dad/ guardian Lola briefly, Jerrod wants to leave his current group home and go back to his own place. Dad endorses paranoia about meds, workers.     Since the last visit, he's been OK.  - ambivalent if he is actual taking lithium 300mg, feels his med makes his brain \"really depressed\", worries for weight gain  - he thinks he liked olanzapine and Abilify  - living in a group home, this is OK, he is safe  - dislikes the \"fattening food\" at the group home, wants to be able to workout  - he dislikes his housemates and staff  - enjoys basketball, watching TV and Youtube  - support from Dad/ guardian    Recent Symptoms:   Depression: feeling low, holding hope he can move, intermittent irritability, denies current SI; denies HI, denies anger dyscontrol  Psychosis: paranoia and " persecutory delusions about people following him, paranoid about his safety, other's intentions, that they'll hurt him  Anxiety: endorses pacing, feeling trapped, dislikes being touched by strangers    Diagnosed with FAS as a child; reported FSIQ 46. While he does not read or write, he's reported letters, numbers from pills to identify what he is taking.    ADVERSE EFFECTS: N/A  MEDICAL CONCERNS: knee pain     Recent Labs   Lab Test 22  1101 21  1319 10/01/19  1316   LITHIUM 0.5 1.31* 0.92     APPETITE: dislikes [facility] meals, 154# in Dec 2023, 198# in 2022; wishes he was drinking more water  SLEEP: restless sleep, the [facility] is loud, naps as needed     Recent Substance Use:  Caffeine- feels lithium causes him to want sugary drinks like Gatorade    Social/ Family History                 FINANCIAL SUPPORT- social security disability and Dad/guardian is his payee        CHILDREN- never , no kids       LIVING SITUATION- lives independently in his own apartment at his group home  LEGAL- none  EARLY HISTORY/ EDUCATION- moved from [place] to [place] at 5yo, removed from his Mom's custody and placed in foster care. Aware he has 12 siblings in [place]. He thinks he graduated high school [employer].  SOCIAL/ SPIRITUAL SUPPORT- limited social support, identifies as not Catholic.       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    Mood disorder in conditions classified elsewhere    Oppositional defiant disorder    CARDIOVASCULAR SCREENING; LDL GOAL LESS THAN 160    Hypothyroidism    Helicobacter positive gastritis    Hypothyroidism due to  medication    Hypothyroidism, unspecified type    Gastroesophageal reflux disease without esophagitis    Elevated cholesterol with elevated triglycerides    Elevated serum creatinine       Past Surgical History:   Procedure Laterality Date    BIOPSY  Marrow      Medical Review of Systems             A comprehensive review of systems was performed and is negative other than noted in the HPI.    Aware he had a head injury playing basketball when he was younger, denies LOC. Denies seizures.    Allergy    Depakote [valproic acid] and Ibuprofen     Depakote- unspecified reaction    Current Medications        Current Outpatient Medications   Medication Sig Dispense Refill    lithium ER (LITHOBID) 300 MG CR tablet Take 1 tablet (300 mg) by mouth at bedtime 30 tablet 5    levothyroxine (SYNTHROID/LEVOTHROID) 50 MCG tablet TAKE 1 TABLET BY MOUTH EVERY MORNING (Patient not taking: Reported on 12/29/2023) 90 tablet 1    omeprazole (PRILOSEC) 20 MG DR capsule TAKE 1 CAPSULE BY MOUTH ONCE DAILY 30-60 MINUTES BEFORE A MEAL (Patient not taking: Reported on 12/29/2023) 90 capsule 1     Vitals        There were no vitals taken for this visit.     Pulse Readings from Last 5 Encounters:   12/20/23 72   04/01/22 70   05/25/21 79   02/12/21 78   02/09/21 99     Wt Readings from Last 5 Encounters:   12/20/23 69.9 kg (154 lb 1.6 oz)   04/01/22 89.8 kg (198 lb)   05/25/21 85.9 kg (189 lb 6.4 oz)   02/12/21 85.3 kg (188 lb)   02/09/21 85.3 kg (188 lb)     BP Readings from Last 5 Encounters:   12/20/23 135/79   04/01/22 137/89   05/25/21 133/82   02/12/21 138/80   02/09/21 138/88     Mental Status Exam           Alertness: alert  and oriented  Appearance: phone visit  Behavior/Demeanor: cooperative, calm, with N/A eye contact   Speech: normal and regular rate and rhythm  Language: no problems  Psychomotor: phone visit  Mood: frustrated, subdued, paranoid  Affect: restricted and appropriate; was congruent to mood; was congruent to  content  Thought Process/Associations: thought blocking  Thought Content:  Reports paranoid ideation;  Denies suicidal ideation, violent ideation, preoccupations, obsessions , phobia , magical thinking and over-valued ideas  Perception:  Reports none;  Denies auditory hallucinations, visual hallucinations, visual distortion seen as shadows , depersonalization and derealization  Insight: gaining/ maintaining insight is challenging  Judgment: adequate for safety  Cognition:does  appear grossly intact; formal cognitive testing was not done  fund of knowledge: delayed  Gait/Station and/or Muscle Strength/Tone: N/A    Labs and Data                          Rating Scales:      PHQ9 Today:        6/21/2023    11:13 AM 8/2/2023     8:21 AM 12/20/2023    12:25 PM   PHQ   PHQ-9 Total Score 6 7 0   Q9: Thoughts of better off dead/self-harm past 2 weeks Several days Several days Not at all   F/U: Thoughts of suicide or self-harm No Yes    F/U: Self harm-plan  Yes    F/U: Self-harm action  No    F/U: Safety concerns Yes Yes      ANTIPSYCHOTIC LABS    Recent Labs   Lab Test 04/01/22  1101 02/09/21  1319 12/11/19  1458 10/18/19  1300   GLC 96 92  --  106*   A1C 5.3 5.6 5.5  --      Recent Labs   Lab Test 04/01/22  1101 10/01/19  1316 07/25/18  1044   CHOL 256* 248* 239*   TRIG 181* 132 641*   * 167* Cannot estimate LDL when triglyceride exceeds 400 mg/dL   HDL 60 55 47     Recent Labs   Lab Test 04/01/22  1101 02/09/21  1319 10/01/19  1316   AST 21 16 13   ALT 27 29 23   ALKPHOS 90 86 92     Recent Labs   Lab Test 04/01/22  1101 02/09/21  1319 10/01/19  1316 07/25/18  1044   WBC 5.8 6.2 6.4 7.1   ANEU  --   --  2.4 3.0   HGB 13.6 13.7 13.7 13.9    152 165 167     Diagnosis      Unspecified mood disorder, FAS, history of verbal and physical outbursts plus ODD     Assessment        TODAY, the following items were discussed:    : 02/2021    PSYCHOTROPIC DRUG INTERACTIONS:   - LITHIUM  -- OLANZAPINE may result in  weakness, dyskinesias, increased extrapyramidal symptoms, encephalopathy, and brain damage.     Drug Interaction Management: monitoring for adverse effects, routine vitals, routine labs, periodic EKGs, using lowest therapeutic dose of psychotropics and patient is aware of risks    Plan                                                                                                                        1) he chooses to stop lithium and retrial olanzapine 5mg at bed     - tremor started as a child, lithium started as a teen    2) sees therapist Kaley of 8 years as needed  3) future lab orders placed in May 2023, monitoring EKG (Feb 2021 NSR with 390 QTc; Nov 2017 RKG borderline rhythm, 364 QTc    RTC: 3 weeks, sooner as needed    Level of Medical Decision Making:   - At least 1 chronic problem that is not stable  - Engaged in prescription drug management during visit (discussed any medication benefits, side effects, alternatives, etc.)     The longitudinal plan of care for mood stability, safety was addressed during this visit. Due to the added complexity in care, I will continue to support Jerrod in the subsequent management of this condition(s) and with the ongoing continuity of care of this condition(s).    CRISIS NUMBERS:   Provided routinely in AVS.    Treatment Risk Statement:  The patient understands the risks, benefits, adverse effects and alternatives. Agrees to treatment with the capacity to do so. No medical contraindications to treatment. Agrees to call clinic for any problems. The patient understands to call 911 or go to the nearest ED if life threatening or urgent symptoms occur.     WHODAS 2.0  TODAY total score = N/A; [a 12-item WHODAS 2.0 assessment was not completed by the pt today and/or recorded in EPIC].    PROVIDER:  TERENCE Park CNP

## 2024-06-22 ENCOUNTER — HEALTH MAINTENANCE LETTER (OUTPATIENT)
Age: 34
End: 2024-06-22

## 2024-07-30 DIAGNOSIS — F06.30 MOOD DISORDER IN CONDITIONS CLASSIFIED ELSEWHERE: ICD-10-CM

## 2024-07-30 NOTE — TELEPHONE ENCOUNTER
[facility] Call Center    Phone Message    May a detailed message be left on voicemail: yes     Reason for Call: Medication Refill Request    Has the patient contacted the pharmacy for the refill? Yes   Name of medication being requested: Metformin and Clonazepam?  Provider who prescribed the medication: Mercedez Dunn  Pharmacy: [facility] [place] - [place], [place] - [address] [facility]   Date medication is needed: Patient is out of medications    Group home staff says patient was hospitalized recently and put on above medications and they were working well for him, is currently out and would like refill for patient to . Writer scheduled MFU with Mercedez Bello.    Action Taken: Message routed to:  Other: P PSYCHIATRY NURSE-P    Travel Screening: Not Applicable     Date of Service:

## 2024-07-30 NOTE — TELEPHONE ENCOUNTER
[place] [facility] [place]:  metFORMIN 500 mg Tabs  Commonly known as: GLUCOPHAGE  Take 1 tablet (500 mg) by mouth twice daily. Indications: Antipsychotic side effect     -clonazepam not listed as a discharge medication. [place] [facility] [place], lorazepam was ordered as an emergency medications while hospitalized.       From chart note:   Not listed in last visit note with Mercedez Bello  Scheduled with Mercedez Ace on 8/14/24    Medication unable to be refilled by RN due to criteria not met as indicated.                 []Eligibility - not seen in the last year              []Supervision - no future appointment              []Compliance - no shows, cancellations or lapse in therapy              [x]Verification - order discrepancy              []Controlled medication              []Medication not included in policy              []90-day supply request              [x]Other:  ordered by another Provider while inpatient.

## 2024-07-30 NOTE — TELEPHONE ENCOUNTER
Last seen: 6/20/24  RTC:  3 weeks, sooner as needed   Cancel: 0  No-show: 0  Next appt: 8/14/24       Medication requested:   No prescriptions requested or ordered in this encounter   Requested medication not on med list       From chart note:   he chooses to stop lithium and retrial olanzapine 5mg at bed       Refills sent to RN for final review

## 2024-07-31 DIAGNOSIS — F06.30 MOOD DISORDER IN CONDITIONS CLASSIFIED ELSEWHERE: ICD-10-CM

## 2024-07-31 RX ORDER — OLANZAPINE 10 MG/1
10 TABLET ORAL AT BEDTIME
Qty: 30 TABLET | Refills: 0 | Status: SHIPPED | OUTPATIENT
Start: 2024-07-31 | End: 2024-08-14

## 2024-07-31 NOTE — TELEPHONE ENCOUNTER
Health Call Center    Phone Message    May a detailed message be left on voicemail: yes     Reason for Call: Medication Refill Request    Has the patient contacted the pharmacy for the refill? Yes     Name of medication being requested: Zyprexa 5MG     Provider who prescribed the medication: Mercedez Bello     Pharmacy: [id number] [facility] [id number]     Date medication is needed: Asap (Pt is currently out of medication) (Nena from patient group home stated that when the patient got admitted to the hospital they started giving him 10 MG of Zyprexa.     Nena would like a call back from a  nurse clarifying patients medications.        Action Taken: Other: [facility]     Travel Screening: Not Applicable

## 2024-07-31 NOTE — TELEPHONE ENCOUNTER
Per [facility] [place] instructions on 6/28/24:  CHANGE how you take these medications     OLANZapine 10 mg  Commonly known as: ZyPREXA  Take 1 tablet (10 mg) by mouth at bedtime. Indications: Psychotic Depressive Illness  What changed:   medication strength  how much to take      Follow up:  Returned call to  staff and spoke with Nena. She reports the staff who called yesterday meant to request olanzapine, not clonazepam.   Olanzapine was increased from 5 mg to 10 mg at [facility] while inpatient.  staff have noticed a significant improvement since this dosage was changed and Lithium was discontinued. They have not noticed any side effects related to medication changes.    Last seen: 6/20/24  RTC: 3 weeks  Cancel: none  No-show: none  Next appt: 8/14/24       Medication requested:   Pending Prescriptions:                       Disp   Refills    OLANZapine (ZYPREXA) 10 MG tablet         30 tab*0            Sig: Take 1 tablet (10 mg) by mouth at bedtime        From chart note:    he chooses to stop lithium and retrial olanzapine 5mg at bed       Medication unable to be refilled by RN due to criteria not met as indicated.                 []Eligibility - not seen in the last year              []Supervision - no future appointment              []Compliance - no shows, cancellations or lapse in therapy              [x]Verification - order discrepancy              []Controlled medication              []Medication not included in policy              []90-day supply request              []Other:

## 2024-08-14 ENCOUNTER — VIRTUAL VISIT (OUTPATIENT)
Dept: PSYCHIATRY | Facility: CLINIC | Age: 34
End: 2024-08-14
Attending: NURSE PRACTITIONER
Payer: COMMERCIAL

## 2024-08-14 DIAGNOSIS — F06.30 MOOD DISORDER IN CONDITIONS CLASSIFIED ELSEWHERE: ICD-10-CM

## 2024-08-14 PROCEDURE — 99214 OFFICE O/P EST MOD 30 MIN: CPT | Mod: 95 | Performed by: NURSE PRACTITIONER

## 2024-08-14 RX ORDER — OLANZAPINE 10 MG/1
10 TABLET ORAL AT BEDTIME
Qty: 30 TABLET | Refills: 2 | Status: SHIPPED | OUTPATIENT
Start: 2024-08-14

## 2024-08-14 ASSESSMENT — PAIN SCALES - GENERAL: PAINLEVEL: NO PAIN (0)

## 2024-08-14 NOTE — PATIENT INSTRUCTIONS
**For crisis resources, please see the information at the end of this document**   Patient Education    Thank you for coming to the [facility] [place] [facility].     Lab Testing:  If you had lab testing today and your results are reassuring or normal they will be mailed to you or sent through [employer] within 7 days. If the lab tests need quick action we will call you with the results. The phone number we will call with results is # [phone]. If this is not the best number please call our clinic and change the number.     Medication Refills:  If you need any refills please call your pharmacy and they will contact us. Our fax number for refills is [phone].   Three business days of notice are needed for general medication refill requests.   Five business days of notice are needed for controlled substance refill requests.   If you need to change to a different pharmacy, please contact the new pharmacy directly. The new pharmacy will help you get your medications transferred.     Contact Us:  Please call [phone] during business hours (8-5:00 M-F).   If you have medication related questions after clinic hours, or on the weekend, please call [phone].     Financial Assistance [phone]   Medical Records [phone]       MENTAL HEALTH CRISIS RESOURCES:  For a emergency help, please call 911 or go to the nearest Emergency Department.     Emergency Walk-In Options:   EmPATH Unit @ [facility] ([place]): [phone] - Specialized mental health emergency area designed to be calming  [facility] ([place]): [phone]  [facility] ([place]): [phone]  [facility]): [phone]    [facility] Crisis Information:   [place]: [phone]  [place]: [phone]  [place] ([place]) - Adult: [phone]     Child: [phone]  [place] - Adult: [phone]     Child: [phone]  [place]:  [phone]  List of all [facility] resources:   https://[place].gov/dhs/people-we-serve/adults/health-care/mental-health/resources/crisis-contacts.jsp    National Crisis Information:   Crisis Text Line: Text  [place]  to 260817  Suicide & Crisis Lifeline: 988  National Suicide Prevention Lifeline: 4-072-900-TALK (1-615.102.4389)       For online chat options, visit https://suicidepreventionlifeline.org/chat/  Poison Control Center: 9-097-019-0321  Trans Lifeline: 8-525-172-7122 - Hotline for transgender people of all ages  The [employer]: 8-678-394-4191 - Hotline for LGBT youth     For Non-Emergency Support:   Fast Tracker: Mental Health & Substance Use Disorder Resources -   https://www.[employer].org/

## 2024-08-14 NOTE — PROGRESS NOTES
Virtual Visit Details    Type of service:  Video Visit   Video Start Time:  8:40a  Video End Time: 8:50p    Originating Location (pt. Location): Home  Distant Location (provider location):  On-site  Platform used for Video Visit: [facility]  Psychiatry Clinic    PSYCHIATRIC PROGRESS NOTE       Jerrod Solis is a 33 year old male who prefers the name Jerrod and pronouns he, him.  Therapist: sees Kaley Galdamez as needed, has for many years  PCP: Tatiana Juan  Other Providers:  - living in a crisis group home since early May 2024  - pharmacy is [facility] [phone]  - guardian/ Dad Lola   -  Carrington Hernandez through [facility]    PREVIOUS PSYCH  MED TRIALS:  - Depakote (listed as an allergy, unknown response)  - Abilify  - olanzapine 5-10mg  - Wellbutrin  - lithium 300mg (elevated creatinine in June 2024)    Pertinent Background:  See previous notes.  Psych critical item history includes trauma hx, violent behavior and few known details re: psychiatric history    Interim History        The patient is a fair historian and reports recently improved treatment adherence.    Last seen on 6/20/2024 when he chose to continue olanzapine 5mg at bed.    Jerrod presented alone. His Dad Lola was not present.    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 Lithium 300mg stopped due to initial creatinine elevation.    Since the last visit, he's been OK.  - feeling OK about taking olanzapine 10mg at bed  - waking up at 10a to take meds, likely Metformin at 10am and 6p  - worries for weight gain  - living in [facility] group [facility], this is OK, he is safe  - no contact with housemates  -  "communicating with staff  - enjoys basketball, watching TV and Youtube  - support from Dad/ guardian    Recent Symptoms:   Depression: \"I don't really get depressed like that\"; denies irritability, feeling \"level\"   Psychosis: denies paranoia and persecutory delusions about people following him, his safety, other's intentions  Anxiety: denies pacing, dislikes being touched by strangers    Diagnosed with FAS as a child; reported FSIQ 46. While he does not read or write, he's reported letters, numbers from pills to identify what he is taking.    ADVERSE EFFECTS: monitoring weight dominic  MEDICAL CONCERNS: 1.49 and 1.11 creatinine, neutrophil 0.76 and 0.97, 138 glu and 5.8% A1c, 23 Vit D  in 2024 ER     APPETITE: dislikes [facility] meals, 153# in 2024 with 23 BMI, 198# in 2022  - drinking water with meals  SLEEP: sleeping well between 10p-10a, naps if needed    Recent Substance Use:  Caffeine- feels lithium causes him to want sugary drinks like Gatorade    Social/ Family History                 FINANCIAL SUPPORT- social security disability and Dad/guardian is his payee        CHILDREN- never , no kids       LIVING SITUATION- lives independently in his own apartment at his group home  LEGAL- none  EARLY HISTORY/ EDUCATION- moved from [place] to [place] at 5yo, removed from his Mom's custody and placed in foster care. Aware he has 12 siblings in [place]. He thinks he graduated high school [employer].  SOCIAL/ SPIRITUAL SUPPORT- limited social support, identifies as not Muslim.       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    Mood disorder " in conditions classified elsewhere    Oppositional defiant disorder    CARDIOVASCULAR SCREENING; LDL GOAL LESS THAN 160    Hypothyroidism    Helicobacter positive gastritis    Hypothyroidism due to medication    Hypothyroidism, unspecified type    Gastroesophageal reflux disease without esophagitis    Elevated cholesterol with elevated triglycerides    Elevated serum creatinine       Past Surgical History:   Procedure Laterality Date    BIOPSY  M[place]      Medical Review of Systems             A comprehensive review of systems was performed and is negative other than noted in the HPI.    Aware he had a head injury playing basketball when he was younger, denies LOC. Denies seizures.    Allergy    Depakote [valproic acid] and Ibuprofen     Depakote- unspecified reaction    Current Medications        Current Outpatient Medications   Medication Sig Dispense Refill    metFORMIN (GLUCOPHAGE) 500 MG tablet Take 1 tablet (500 mg) by mouth 2 times daily (with meals) 60 tablet 0    OLANZapine (ZYPREXA) 10 MG tablet Take 1 tablet (10 mg) by mouth at bedtime 30 tablet 0    levothyroxine (SYNTHROID/LEVOTHROID) 50 MCG tablet TAKE 1 TABLET BY MOUTH EVERY MORNING (Patient not taking: Reported on 12/29/2023) 90 tablet 1    omeprazole (PRILOSEC) 20 MG DR capsule TAKE 1 CAPSULE BY MOUTH ONCE DAILY 30-60 MINUTES BEFORE A MEAL (Patient not taking: Reported on 12/29/2023) 90 capsule 1     Vitals        There were no vitals taken for this visit.     Pulse Readings from Last 5 Encounters:   12/20/23 72   04/01/22 70   05/25/21 79   02/12/21 78   02/09/21 99     Wt Readings from Last 5 Encounters:   12/20/23 69.9 kg (154 lb 1.6 oz)   04/01/22 89.8 kg (198 lb)   05/25/21 85.9 kg (189 lb 6.4 oz)   02/12/21 85.3 kg (188 lb)   02/09/21 85.3 kg (188 lb)     BP Readings from Last 5 Encounters:   12/20/23 135/79   04/01/22 137/89   05/25/21 133/82   02/12/21 138/80   02/09/21 138/88     Mental Status Exam           Alertness: alert  and  "oriented  Appearance: casually groomed, seated in a hoodie  Behavior/Demeanor: cooperative, calm, with fair eye contact   Speech: normal and regular rate and rhythm  Language: no problems  Psychomotor: seated still  Mood: subdued, \"level\", denies paranoia  Affect: restricted and appropriate; was congruent to mood; was congruent to content  Thought Process/Associations: thought blocking  Thought Content:  Reports paranoid ideation;  Denies suicidal ideation, violent ideation, preoccupations, obsessions , phobia , magical thinking and over-valued ideas  Perception:  Reports none;  Denies auditory hallucinations, visual hallucinations, visual distortion seen as shadows , depersonalization and derealization  Insight: gaining/ maintaining insight is challenging  Judgment: adequate for safety  Cognition:does  appear grossly intact; formal cognitive testing was not done  fund of knowledge: delayed  Gait/Station and/or Muscle Strength/Tone: N/A    Labs and Data                          Rating Scales:      PHQ9 Today:        6/21/2023    11:13 AM 8/2/2023     8:21 AM 12/20/2023    12:25 PM   PHQ   PHQ-9 Total Score 6 7 0   Q9: Thoughts of better off dead/self-harm past 2 weeks Several days Several days Not at all   F/U: Thoughts of suicide or self-harm No Yes    F/U: Self harm-plan  Yes    F/U: Self-harm action  No    F/U: Safety concerns Yes Yes      ANTIPSYCHOTIC LABS    Recent Labs   Lab Test 04/01/22  1101 02/09/21  1319 12/11/19  1458 10/18/19  1300   GLC 96 92  --  106*   A1C 5.3 5.6 5.5  --      Recent Labs   Lab Test 04/01/22  1101 10/01/19  1316 07/25/18  1044   CHOL 256* 248* 239*   TRIG 181* 132 641*   * 167* Cannot estimate LDL when triglyceride exceeds 400 mg/dL   HDL 60 55 47     Recent Labs   Lab Test 04/01/22  1101 02/09/21  1319 10/01/19  1316   AST 21 16 13   ALT 27 29 23   ALKPHOS 90 86 92     Recent Labs   Lab Test 04/01/22  1101 02/09/21  1319 10/01/19  1316 07/25/18  1044   WBC 5.8 6.2 6.4 7.1 "   ANEU  --   --  2.4 3.0   HGB 13.6 13.7 13.7 13.9    152 165 167     Diagnosis      Unspecified mood disorder, FAS, history of verbal and physical outbursts plus ODD     Assessment        TODAY, the following items were discussed:    : 02/2021    PSYCHOTROPIC DRUG INTERACTIONS:   - LITHIUM  -- OLANZAPINE may result in weakness, dyskinesias, increased extrapyramidal symptoms, encephalopathy, and brain damage.     Drug Interaction Management: monitoring for adverse effects, routine vitals, routine labs, periodic EKGs, using lowest therapeutic dose of psychotropics and patient is aware of risks    Plan                                                                                                                        1) he chooses to continue olanzapine 10mg at bed, Metformin 500mg BID    - tremor started as a child, lithium started as a teen  - intermittent neutropenia since childhood, per Dad  - elevated creatinine with lithium 300mg    2) sees therapist Kaley of 8 years as needed  3) monitoring labs, EKG (Feb 2021 NSR with 390 QTc; Nov 2017 RKG borderline rhythm, 364 QTc    RTC: 4 weeks, sooner as needed    12 min spent on the date of the encounter in chart review, patient visit, review of tests, documentation, care coordination, and/or discussion with other providers about the issues documented above. {    Level of Medical Decision Making:   - At least 1 chronic problem that is not stable  - Engaged in prescription drug management during visit (discussed any medication benefits, side effects, alternatives, etc.)     The longitudinal plan of care for mood stability, safety was addressed during this visit. Due to the added complexity in care, I will continue to support Jerrod in the subsequent management of this condition(s) and with the ongoing continuity of care of this condition(s).    CRISIS NUMBERS:   Provided routinely in AVS.    Treatment Risk Statement:  The patient understands the risks,  benefits, adverse effects and alternatives. Agrees to treatment with the capacity to do so. No medical contraindications to treatment. Agrees to call clinic for any problems. The patient understands to call 911 or go to the nearest ED if life threatening or urgent symptoms occur.     WHODAS 2.0  TODAY total score = N/A; [a 12-item WHODAS 2.0 assessment was not completed by the pt today and/or recorded in EPIC].    PROVIDER:  TERENCE Park CNP

## 2024-08-14 NOTE — NURSING NOTE
Current patient location: [address] [facility] [address]    Is the patient currently in the state of [place]? YES    Visit mode:VIDEO    If the visit is dropped, the patient can be reconnected by: VIDEO VISIT: Text to cell phone:   Telephone Information:   Mobile [phone]       Will anyone else be joining the visit? NO  (If patient encounters technical issues they should call [phone]    How would you like to obtain your AVS? MyChart    Are changes needed to the allergy or medication list? No    Are refills needed on medications prescribed by this physician? NO    Rooming Documentation:  Unable to complete questionnaire(s) due to time      Reason for visit: RECHECK    Milagros PATELF

## 2024-09-11 ENCOUNTER — VIRTUAL VISIT (OUTPATIENT)
Dept: PSYCHIATRY | Facility: CLINIC | Age: 34
End: 2024-09-11
Attending: NURSE PRACTITIONER
Payer: COMMERCIAL

## 2024-09-11 DIAGNOSIS — F06.30 MOOD DISORDER IN CONDITIONS CLASSIFIED ELSEWHERE: Primary | ICD-10-CM

## 2024-09-11 PROCEDURE — G2211 COMPLEX E/M VISIT ADD ON: HCPCS | Mod: 95 | Performed by: NURSE PRACTITIONER

## 2024-09-11 PROCEDURE — 99214 OFFICE O/P EST MOD 30 MIN: CPT | Mod: 95 | Performed by: NURSE PRACTITIONER

## 2024-09-11 ASSESSMENT — PAIN SCALES - GENERAL: PAINLEVEL: NO PAIN (0)
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
## 2024-09-11 NOTE — NURSING NOTE
Current patient location: [address] [facility] [address]    Is the patient currently in the state of [place]? YES    Visit mode:VIDEO    If the visit is dropped, the patient can be reconnected by: VIDEO VISIT: Text to cell phone:   Telephone Information:   Mobile [phone]       Will anyone else be joining the visit? NO  (If patient encounters technical issues they should call [phone]    How would you like to obtain your AVS? MyChart    Are changes needed to the allergy or medication list? No    Are refills needed on medications prescribed by this physician? YES    Rooming Documentation:  Questionnaire(s) completed      Reason for visit: RECHECK    Milagros PATELF

## 2024-09-11 NOTE — PATIENT INSTRUCTIONS
Thank you for visiting the Primary Care Center today at the [facility]! The following is some information about our clinic:     Primary Care Center Frequently-Asked Questions    (1) How do I schedule appointments at the [facility]?     Primary Care--to schedule or make changes to an existing appointment, please call our primary care line at [phone].    Labs--to schedule a lab appointment at the [facility] you can use [employer] or call [phone]. If you have a [place] location that is closer to home, you can reach out to that location for scheduling options.     Imaging--if you need to schedule a CT, X-ray, MRI, ultrasound, or other imaging study you can call [phone] to schedule at the [facility] or any other [facility] imaging location.     Referrals--if a referral to another specialty was ordered you can expect a phone call from their scheduling team. If you have not heard from them in a week, please call us or send us a [employer] message to check the status or get a scheduling number. Please note that this only applies to internal [facility] referrals. If the referral is external you would need to contact their office for scheduling.     (2) I have a question about my visit, who do I contact?     You can call us at the primary care line at [phone] to ask questions about your visit. You can also send a secure message through [employer], which is reviewed by clinic staff. Please note that [employer] messages have a twenty-four to forty-eight business hour turnaround time and should not be used for urgent concerns.    (3) How will I get the results of my tests?    If you are signed up for [employer] all tests will be released to you within twenty-four hours of resulting. Please allow three to five days for your doctor to review your results and place a note interpreting the results. If you do not have [employer] you will receive your  results through mail seven to ten business days following the return of the tests. Please note that if there should be any urgent or concerning results that your doctor or their registered nurse will reach out to you the same day as the tests come back. If you have follow up questions about your results or would like to discuss the results in detail please schedule a follow up with your provider either in person or virtually.     (4) How do I get refills of my prescriptions?     You should always first contact your pharmacy for refills of your medications. If submitting a refill request on [employer], please be sure to submit the request only once--repeat requests can cause delays in refill. If you are requesting a NEW medication or a medication related to new symptoms you will need to schedule an appointment with a provider prior to approval. Please note: Routine medication refills have up to one to three business day turnaround whereas controlled substances refills have up to five to seven business day turnaround.    (5) I have new symptoms, what do I do?     If you are having an immediate medical emergency, you should dial 911 for assistance.   For anything urgent that needs to be seen within a few hours to one day you should visit a local urgent care for assistance.  For non-urgent symptoms that need to be seen within a few days to a week you can schedule with an available provider in primary care by going to [facility] or calling [phone].   If you are not sure how serious your symptoms are or you would like to receive medical advice you can always call [phone] to speak with a triage nurse.

## 2024-10-09 ENCOUNTER — VIRTUAL VISIT (OUTPATIENT)
Dept: PSYCHIATRY | Facility: CLINIC | Age: 34
End: 2024-10-09
Attending: NURSE PRACTITIONER
Payer: COMMERCIAL

## 2024-10-09 DIAGNOSIS — F06.30 MOOD DISORDER IN CONDITIONS CLASSIFIED ELSEWHERE: Primary | ICD-10-CM

## 2024-10-09 PROCEDURE — 99442 PR PHYSICIAN TELEPHONE EVALUATION 11-20 MIN: CPT | Mod: 93 | Performed by: NURSE PRACTITIONER

## 2024-10-09 ASSESSMENT — PAIN SCALES - GENERAL: PAINLEVEL: NO PAIN (0)

## 2024-10-09 NOTE — PATIENT INSTRUCTIONS
**For crisis resources, please see the information at the end of this document**   Patient Education    Thank you for coming to the [facility].     Lab Testing:  If you had lab testing today and your results are reassuring or normal they will be mailed to you or sent through [employer] within 7 days. If the lab tests need quick action we will call you with the results. The phone number we will call with results is # [phone]. If this is not the best number please call our clinic and change the number.     Medication Refills:  If you need any refills please call your pharmacy and they will contact us. Our fax number for refills is [phone].   Three business days of notice are needed for general medication refill requests.   Five business days of notice are needed for controlled substance refill requests.   If you need to change to a different pharmacy, please contact the new pharmacy directly. The new pharmacy will help you get your medications transferred.     Contact Us:  Please call [phone] during business hours (8-5:00 M-F).   If you have medication related questions after clinic hours, or on the weekend, please call [phone].     Financial Assistance [phone]   Medical Records [phone]       MENTAL HEALTH CRISIS RESOURCES:  For a emergency help, please call 911 or go to the nearest Emergency Department.     Emergency Walk-In Options:   Em[facility] Unit @ [facility] ([place]): [phone] - Specialized mental health emergency area designed to be calming  [facility] ([place]): [phone]  [facility] ([place]): [phone]  [facility]): [phone]    [facility] Crisis Information:   [place]: [phone]  [place]: [phone]  [place] ([place]) - Adult: [phone]     Child: [phone]  [place] - Adult: [phone]     Child: [phone]  [place]:  [phone]  List of all [facility] resources:   https://[place].gov/dhs/people-we-serve/adults/health-care/mental-health/resources/crisis-contacts.jsp    National Crisis Information:   Crisis Text Line: Text  [place]  to 534736  Suicide & Crisis Lifeline: 988  National Suicide Prevention Lifeline: 2-425-376-TALK (1-444.113.2963)       For online chat options, visit https://suicidepreventionlifeline.org/chat/  Poison Control Center: 6-422-335-3847  Trans Lifeline: 5-272-536-0670 - Hotline for transgender people of all ages  The [employer] Project: 1-225-432-1015 - Hotline for LGBT youth     For Non-Emergency Support:   Fast Tracker: Mental Health & Substance Use Disorder Resources -   https://www.[place]fl[place].org/

## 2024-10-09 NOTE — NURSING NOTE
Current patient location: [address] [facility] [address]    Is the patient currently in the state of [place]? YES    Visit mode:TELEPHONE    If the visit is dropped, the patient can be reconnected by: TELEPHONE VISIT: Phone number:   Telephone Information:   Mobile [phone]       Will anyone else be joining the visit? NO  (If patient encounters technical issues they should call [phone])    Are changes needed to the allergy or medication list? No    Are refills needed on medications prescribed by this physician? Discuss with provider    Rooming Documentation:  Unable to complete questionnaire(s) due to time    Reason for visit: RECHECK    Milagros PATELF

## 2024-10-09 NOTE — PROGRESS NOTES
"Virtual Visit Details    Type of service:  Telephone Visit   Phone call duration: 15 minutes (3:44p - 3:59p)  Originating Location (pt. Location): Home  Distant Location (provider location):  Off-site       [facility]  Psychiatry Clinic    PSYCHIATRIC PROGRESS NOTE       Jerrod Solis is a 33 year old male who prefers the name Jerrod and pronouns he, him.  Therapist: sees Kaley Galdamez as needed, has for many years  PCP: Tatiana Juan  Other Providers:  - living in a crisis group home since early May 2024  - [facility] [phone]  - guardian/ Dad Lola   - [facility]    PREVIOUS PSYCH  MED TRIALS:  - Depakote (listed as an allergy, unknown response)  - Abilify  - olanzapine 5-10mg  - Wellbutrin  - lithium 300mg (elevated creatinine in June 2024)    Pertinent Background:  See previous notes.  Psych critical item history includes trauma hx, violent behavior and few known details re: psychiatric history    Interim History        The patient is a fair historian and reports recently improved treatment adherence.    Last seen on 9/11/2024 when he chose to continue olanzapine 10mg at bed, Metformin 500mg BID.    Jerrod presented alone. His Dad Lola was not present.    Admitted to [facility] 6/24 - 6/28/2024 for SI, increased paranoia, episodes of dysregulation. He was treated neutropenia (monitoring CBC daily, elevated cholesterol and LDL). Discharged with MDD with psychosis, depressive disorder due to [facility], unspecified intellectual disability. Lithium 300mg stopped due to initial creatinine elevation.    Since the last visit, he's been \"good\".  - feeling OK about taking olanzapine 10mg at bed, he falls asleep in 30-60 min  - waking up about 10am, taking Metformin at 10a and 6p    - he has a SO Lakshmi, this is going pretty good  - he goes to see her in [place], he thinks she can't come into a group home  - doing OK with his Dad    - anticipates moving out of " [facility] group [facility] when he has movers to help  - he has an apartment in [facility] waiting for him with support of NEVILLE De Leon    - communicating with staff, no contact with housemates  - enjoys basketball, watching TV and Youtube  - support from Dad/ guardian    Recent Symptoms:   Depression: denies depression and irritability  Psychosis: denies paranoia and persecutory delusions about people following him, his safety, other's intentions  Anxiety: he denies anxiety, he denies pacing, dislikes being touched by strangers    Diagnosed with FAS as a child; reported FSIQ 46. While he does not read or write, he's reported letters, numbers from pills to identify what he is taking.    ADVERSE EFFECTS: monitoring weight   MEDICAL CONCERNS: repeat BMP in June with unremarkable BUN, creatinine, LFTs; 138 glu; 5.8% A1c, 23 Vit D  in June 2024 ER     APPETITE: food is good at [facility], prefers eating healthy, water with meals, 153# in Apr 2024 with 23 BMI, 198# in Apr 2022  SLEEP: sleeping well between 10p-10a, naps if needed    Recent Substance Use:  Caffeine- no soda, energy drinks, Gatorade; occasional coffee with SO, flavored water    Social/ Family History                 FINANCIAL SUPPORT- social security disability and Dad/guardian is his payee        CHILDREN- never , no kids       LIVING SITUATION- group home  LEGAL- none  EARLY HISTORY/ EDUCATION- moved from [place] to [place] at 7yo, removed from his Mom's custody and placed in foster care. Aware he has 12 siblings in [place]. He thinks he graduated high school [employer].  SOCIAL/ SPIRITUAL SUPPORT- limited social support, identifies as not Jain.       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    Mood disorder in conditions classified elsewhere    Oppositional defiant disorder    CARDIOVASCULAR SCREENING; LDL GOAL LESS THAN 160    Hypothyroidism    Helicobacter positive gastritis    Hypothyroidism due to medication    Hypothyroidism, unspecified type    Gastroesophageal reflux disease without esophagitis    Elevated cholesterol with elevated triglycerides    Elevated serum creatinine       Past Surgical History:   Procedure Laterality Date    BIOPSY  [place]      Medical Review of Systems             A comprehensive review of systems was performed and is negative other than noted in the HPI.    Aware he had a head injury playing basketball when he was younger, denies LOC. Denies seizures.    Allergy    Depakote [valproic acid] and Ibuprofen     Depakote- unspecified reaction    Current Medications        Current Outpatient Medications   Medication Sig Dispense Refill    metFORMIN (GLUCOPHAGE) 500 MG tablet Take 1 tablet (500 mg) by mouth 2 times daily (with meals) 60 tablet 2    OLANZapine (ZYPREXA) 10 MG tablet Take 1 tablet (10 mg) by mouth at bedtime 30 tablet 2    levothyroxine (SYNTHROID/LEVOTHROID) 50 MCG tablet TAKE 1 TABLET BY MOUTH EVERY MORNING (Patient not taking: Reported on 2023) 90 tablet 1    omeprazole (PRILOSEC) 20 MG DR capsule TAKE 1 CAPSULE BY MOUTH ONCE DAILY 30-60 MINUTES BEFORE A MEAL (Patient not taking: Reported on 2023) 90 capsule 1     Vitals        There were no vitals taken for this visit.     Pulse Readings from Last 5 Encounters:   23 72   22 70   21 79   21 78   21 99     Wt Readings from Last 5 Encounters:   23 69.9 kg (154 lb 1.6 oz)   22 89.8 kg (198 lb)   21 85.9 kg (189 lb 6.4 oz)   21 85.3 kg (188 lb)   21 85.3 kg (188 lb)     BP Readings from Last 5 Encounters:   23 135/79    04/01/22 137/89   05/25/21 133/82   02/12/21 138/80   02/09/21 138/88     Mental Status Exam           Alertness: alert  and oriented  Appearance: phone visits  Behavior/Demeanor: cooperative, calm, with N/A eye contact   Speech: normal and regular rate and rhythm  Language: no problems  Psychomotor: phone visit  Mood: stable  Affect: restricted and appropriate; was congruent to mood; was congruent to content  Thought Process/Associations: thought blocking  Thought Content:  Reports paranoid ideation;  Denies suicidal ideation, violent ideation, preoccupations, obsessions , phobia , magical thinking and over-valued ideas  Perception:  Reports none;  Denies auditory hallucinations, visual hallucinations, visual distortion seen as shadows , depersonalization and derealization  Insight: gaining/ maintaining insight is challenging  Judgment: adequate for safety  Cognition:does  appear grossly intact; formal cognitive testing was not done  fund of knowledge: delayed  Gait/Station and/or Muscle Strength/Tone: N/A    Labs and Data                          Rating Scales:      PHQ9 Today:        6/21/2023    11:13 AM 8/2/2023     8:21 AM 12/20/2023    12:25 PM   PHQ   PHQ-9 Total Score 6 7 0   Q9: Thoughts of better off dead/self-harm past 2 weeks Several days Several days Not at all   F/U: Thoughts of suicide or self-harm No Yes    F/U: Self harm-plan  Yes    F/U: Self-harm action  No    F/U: Safety concerns Yes Yes      ANTIPSYCHOTIC LABS    Recent Labs   Lab Test 04/01/22  1101 02/09/21  1319 12/11/19  1458 10/18/19  1300   GLC 96 92  --  106*   A1C 5.3 5.6 5.5  --      Recent Labs   Lab Test 04/01/22  1101 10/01/19  1316 07/25/18  1044   CHOL 256* 248* 239*   TRIG 181* 132 641*   * 167* Cannot estimate LDL when triglyceride exceeds 400 mg/dL   HDL 60 55 47     Recent Labs   Lab Test 04/01/22  1101 02/09/21  1319 10/01/19  1316   AST 21 16 13   ALT 27 29 23   ALKPHOS 90 86 92     Recent Labs   Lab Test  04/01/22  1101 02/09/21  1319 10/01/19  1316 07/25/18  1044   WBC 5.8 6.2 6.4 7.1   ANEU  --   --  2.4 3.0   HGB 13.6 13.7 13.7 13.9    152 165 167     Diagnosis      Unspecified mood disorder, FAS, history of verbal and physical outbursts plus ODD     Assessment        TODAY, the following items were discussed:    : 02/2021    PSYCHOTROPIC DRUG INTERACTIONS:   - LITHIUM  -- OLANZAPINE may result in weakness, dyskinesias, increased extrapyramidal symptoms, encephalopathy, and brain damage.     Drug Interaction Management: monitoring for adverse effects, routine vitals, routine labs, periodic EKGs, using lowest therapeutic dose of psychotropics and patient is aware of risks    Plan                                                                                                                        1) he chooses to continue olanzapine 10mg at bed, Metformin 500mg BID    - tremor started as a child, lithium started as a teen  - intermittent neutropenia since childhood, per Dad  - elevated creatinine with lithium 300mg    2) sees therapist Kaley of 8 years as needed  3) monitoring labs, EKG (Feb 2021 NSR with 390 QTc; Nov 2017 RKG borderline rhythm, 364 QTc    RTC: 4 weeks, sooner as needed    CRISIS NUMBERS:   Provided routinely in AVS.    Treatment Risk Statement:  The patient understands the risks, benefits, adverse effects and alternatives. Agrees to treatment with the capacity to do so. No medical contraindications to treatment. Agrees to call clinic for any problems. The patient understands to call 911 or go to the nearest ED if life threatening or urgent symptoms occur.     WHODAS 2.0  TODAY total score = N/A; [a 12-item WHODAS 2.0 assessment was not completed by the pt today and/or recorded in EPIC].    PROVIDER:  TERENCE Park CNP

## 2024-11-01 ENCOUNTER — MYC MEDICAL ADVICE (OUTPATIENT)
Dept: PSYCHIATRY | Facility: CLINIC | Age: 34
End: 2024-11-01
Payer: COMMERCIAL

## 2024-11-01 DIAGNOSIS — F06.30 MOOD DISORDER IN CONDITIONS CLASSIFIED ELSEWHERE: ICD-10-CM

## 2024-11-01 RX ORDER — OLANZAPINE 10 MG/1
10 TABLET ORAL AT BEDTIME
Qty: 30 TABLET | Refills: 2 | Status: SHIPPED | OUTPATIENT
Start: 2024-11-01

## 2024-11-01 NOTE — TELEPHONE ENCOUNTER
Received incoming call from Lola knapp after reading the below [employer] message. He shared that patient has left the group home and was only given 5 days supply. Niranjan called [facility] and was told his profile was closed out as he is not longer with the group home. Patient does not have enough meds to get him through till the appt on 11/6.     Last seen: 10/9  RTC: 4 weeks   Cancel: none   No-show: none   Next appt: 11/6    Incoming refill from family via [employer]     Disp Refills Start End DEVIN    metFORMIN (GLUCOPHAGE) 500 MG tablet 60 tablet 2 8/14/2024 -- No   Sig - Route: Take 1 tablet (500 mg) by mouth 2 times daily (with meals) - Oral   Sent to pharmacy as: metFORMIN HCl 500 MG Oral Tablet (GLUCOPHAGE)   Class: E-Prescribe   Order: [id number]   E-Prescribing Status: Receipt confirmed by pharmacy (8/14/2024  8:59 AM CDT)     Disp Refills Start End DEVIN    OLANZapine (ZYPREXA) 10 MG tablet 30 tablet 2 8/14/2024 -- No   Sig - Route: Take 1 tablet (10 mg) by mouth at bedtime - Oral   Sent to pharmacy as: OLANZapine 10 MG Oral Tablet (zyPREXA)   Class: E-Prescribe   Order: [id number]   E-Prescribing Status: Receipt confirmed by pharmacy (8/14/2024  8:59 AM CDT)         Last Visit Treatment Plan     1) PSYCHOTROPIC MEDICATIONS:  1) he chooses to continue olanzapine 10mg at bed, Metformin 500mg BID     Refill decision: Refill pended and routed to the provider for review/determination due to the following criteria not met:     Medication unable to be refilled by RN due to criteria not met as indicated.                 []Eligibility - not seen in the last year              []Supervision - no future appointment              []Compliance - no shows, cancellations or lapse in therapy              []Verification - order discrepancy              []Controlled medication              []Medication not included in policy              []90-day supply request              [x]Other: Will route to provider for approval